# Patient Record
Sex: FEMALE | Race: BLACK OR AFRICAN AMERICAN | Employment: UNEMPLOYED | URBAN - METROPOLITAN AREA
[De-identification: names, ages, dates, MRNs, and addresses within clinical notes are randomized per-mention and may not be internally consistent; named-entity substitution may affect disease eponyms.]

---

## 2017-01-07 RX ORDER — FUROSEMIDE 40 MG/1
TABLET ORAL
Qty: 90 TABLET | Refills: 1 | Status: SHIPPED | OUTPATIENT
Start: 2017-01-07 | End: 2017-06-25 | Stop reason: SDUPTHER

## 2017-01-16 ENCOUNTER — TELEPHONE (OUTPATIENT)
Dept: FAMILY MEDICINE CLINIC | Age: 47
End: 2017-01-16

## 2017-01-16 RX ORDER — ATORVASTATIN CALCIUM 40 MG/1
40 TABLET, FILM COATED ORAL DAILY
Qty: 90 TABLET | Refills: 1 | Status: SHIPPED | OUTPATIENT
Start: 2017-01-16 | End: 2017-06-12 | Stop reason: SDUPTHER

## 2017-01-21 LAB
ALBUMIN SERPL-MCNC: 4.1 G/DL
ALP BLD-CCNC: 60 U/L
ALT SERPL-CCNC: 14 U/L
AST SERPL-CCNC: 16 U/L
BILIRUB SERPL-MCNC: 0.3 MG/DL (ref 0.1–1.4)
BUN BLDV-MCNC: 16 MG/DL
CALCIUM SERPL-MCNC: 9.1 MG/DL
CHLORIDE BLD-SCNC: 108 MMOL/L
CHOLESTEROL, TOTAL: 140 MG/DL
CHOLESTEROL/HDL RATIO: 3
CO2: NORMAL MMOL/L
CREAT SERPL-MCNC: 0.95 MG/DL
CREATININE, URINE: 123
GFR CALCULATED: >60
GLUCOSE BLD-MCNC: 124 MG/DL
HBA1C MFR BLD: 5.8 %
HDLC SERPL-MCNC: 46 MG/DL (ref 35–70)
LDL CHOLESTEROL CALCULATED: 77 MG/DL (ref 0–160)
MICROALBUMIN/CREAT 24H UR: <5 MG/G{CREAT}
MICROALBUMIN/CREAT UR-RTO: NORMAL
POTASSIUM SERPL-SCNC: 3.3 MMOL/L
SODIUM BLD-SCNC: 141 MMOL/L
TOTAL PROTEIN: 8.4
TRIGL SERPL-MCNC: 87 MG/DL
VLDLC SERPL CALC-MCNC: 17 MG/DL

## 2017-01-23 ENCOUNTER — OFFICE VISIT (OUTPATIENT)
Dept: FAMILY MEDICINE CLINIC | Age: 47
End: 2017-01-23

## 2017-01-23 VITALS
TEMPERATURE: 98.2 F | DIASTOLIC BLOOD PRESSURE: 82 MMHG | SYSTOLIC BLOOD PRESSURE: 110 MMHG | BODY MASS INDEX: 42.92 KG/M2 | HEART RATE: 80 BPM | WEIGHT: 251.4 LBS | RESPIRATION RATE: 16 BRPM | HEIGHT: 64 IN

## 2017-01-23 DIAGNOSIS — G89.29 CHRONIC PAIN OF RIGHT KNEE: ICD-10-CM

## 2017-01-23 DIAGNOSIS — E78.2 MIXED HYPERLIPIDEMIA: ICD-10-CM

## 2017-01-23 DIAGNOSIS — E11.42 DM TYPE 2 WITH DIABETIC PERIPHERAL NEUROPATHY (HCC): Primary | ICD-10-CM

## 2017-01-23 DIAGNOSIS — K21.9 GASTROESOPHAGEAL REFLUX DISEASE WITHOUT ESOPHAGITIS: ICD-10-CM

## 2017-01-23 DIAGNOSIS — I10 ESSENTIAL HYPERTENSION: ICD-10-CM

## 2017-01-23 DIAGNOSIS — Z23 NEED FOR PNEUMOCOCCAL VACCINATION: ICD-10-CM

## 2017-01-23 DIAGNOSIS — M54.50 ACUTE MIDLINE LOW BACK PAIN WITHOUT SCIATICA: ICD-10-CM

## 2017-01-23 DIAGNOSIS — E11.42 DM TYPE 2 WITH DIABETIC PERIPHERAL NEUROPATHY (HCC): ICD-10-CM

## 2017-01-23 DIAGNOSIS — M54.42 CHRONIC BILATERAL LOW BACK PAIN WITH BILATERAL SCIATICA: ICD-10-CM

## 2017-01-23 DIAGNOSIS — M25.561 CHRONIC PAIN OF RIGHT KNEE: ICD-10-CM

## 2017-01-23 DIAGNOSIS — M54.6 ACUTE MIDLINE THORACIC BACK PAIN: ICD-10-CM

## 2017-01-23 DIAGNOSIS — M54.41 CHRONIC BILATERAL LOW BACK PAIN WITH BILATERAL SCIATICA: ICD-10-CM

## 2017-01-23 DIAGNOSIS — E55.9 VITAMIN D DEFICIENCY: ICD-10-CM

## 2017-01-23 DIAGNOSIS — G89.29 CHRONIC BILATERAL LOW BACK PAIN WITH BILATERAL SCIATICA: ICD-10-CM

## 2017-01-23 PROCEDURE — 90732 PPSV23 VACC 2 YRS+ SUBQ/IM: CPT | Performed by: FAMILY MEDICINE

## 2017-01-23 PROCEDURE — 90471 IMMUNIZATION ADMIN: CPT | Performed by: FAMILY MEDICINE

## 2017-01-23 PROCEDURE — 99214 OFFICE O/P EST MOD 30 MIN: CPT | Performed by: FAMILY MEDICINE

## 2017-01-23 RX ORDER — ZOLPIDEM TARTRATE 10 MG/1
TABLET ORAL
Qty: 20 TABLET | Refills: 2 | Status: SHIPPED | OUTPATIENT
Start: 2017-01-23 | End: 2017-03-29 | Stop reason: SDUPTHER

## 2017-01-23 RX ORDER — OXYCODONE HYDROCHLORIDE 5 MG/1
5 TABLET ORAL EVERY 8 HOURS PRN
Qty: 60 TABLET | Refills: 0 | Status: SHIPPED | OUTPATIENT
Start: 2017-02-22 | End: 2017-04-04 | Stop reason: SDUPTHER

## 2017-01-23 RX ORDER — SPIRONOLACTONE 25 MG/1
25 TABLET ORAL DAILY
Qty: 90 TABLET | Refills: 1 | Status: ON HOLD | OUTPATIENT
Start: 2017-01-23 | End: 2017-08-17 | Stop reason: HOSPADM

## 2017-01-23 RX ORDER — ERGOCALCIFEROL 1.25 MG/1
CAPSULE ORAL
Qty: 12 CAPSULE | Refills: 1 | Status: SHIPPED | OUTPATIENT
Start: 2017-01-23 | End: 2017-06-05 | Stop reason: SDUPTHER

## 2017-01-23 RX ORDER — OXYCODONE HYDROCHLORIDE 5 MG/1
5 TABLET ORAL EVERY 8 HOURS PRN
Qty: 60 TABLET | Refills: 0 | Status: SHIPPED | OUTPATIENT
Start: 2017-03-23 | End: 2017-03-29

## 2017-01-23 RX ORDER — OXYCODONE HYDROCHLORIDE 5 MG/1
5 TABLET ORAL EVERY 8 HOURS PRN
Qty: 60 TABLET | Refills: 0 | Status: SHIPPED | OUTPATIENT
Start: 2017-01-23 | End: 2017-02-22

## 2017-01-23 RX ORDER — LIDOCAINE 50 MG/G
1 PATCH TOPICAL DAILY
Qty: 30 PATCH | Refills: 5 | Status: SHIPPED | OUTPATIENT
Start: 2017-01-23 | End: 2017-08-23 | Stop reason: SDUPTHER

## 2017-01-23 RX ORDER — GABAPENTIN 300 MG/1
CAPSULE ORAL
Qty: 270 CAPSULE | Refills: 1 | Status: SHIPPED | OUTPATIENT
Start: 2017-01-23 | End: 2017-02-09 | Stop reason: DRUGHIGH

## 2017-01-23 RX ORDER — POTASSIUM CHLORIDE 1.5 G/1.77G
20 POWDER, FOR SOLUTION ORAL 2 TIMES DAILY
Qty: 180 EACH | Refills: 1 | Status: SHIPPED | OUTPATIENT
Start: 2017-01-23 | End: 2017-08-23 | Stop reason: SDUPTHER

## 2017-01-23 RX ORDER — MELOXICAM 15 MG/1
15 TABLET ORAL DAILY
Qty: 90 TABLET | Refills: 1 | Status: ON HOLD | OUTPATIENT
Start: 2017-01-23 | End: 2017-08-17 | Stop reason: HOSPADM

## 2017-01-31 ENCOUNTER — TELEPHONE (OUTPATIENT)
Dept: FAMILY MEDICINE CLINIC | Age: 47
End: 2017-01-31

## 2017-02-02 PROBLEM — M25.562 ARTHRALGIA OF LEFT KNEE: Status: ACTIVE | Noted: 2017-02-02

## 2017-02-02 PROBLEM — K21.9 GASTROESOPHAGEAL REFLUX DISEASE WITHOUT ESOPHAGITIS: Status: ACTIVE | Noted: 2017-02-02

## 2017-02-02 PROBLEM — M47.25 OSTEOARTHRITIS OF SPINE WITH RADICULOPATHY, THORACOLUMBAR REGION: Status: ACTIVE | Noted: 2017-02-02

## 2017-02-02 PROBLEM — E66.01 MORBID OBESITY DUE TO EXCESS CALORIES (HCC): Status: ACTIVE | Noted: 2017-02-02

## 2017-02-09 PROBLEM — Q76.49 TRANSITIONAL VERTEBRAE: Status: ACTIVE | Noted: 2017-02-09

## 2017-02-14 PROBLEM — M54.16 LUMBAR RADICULOPATHY: Status: ACTIVE | Noted: 2017-02-14

## 2017-03-27 ENCOUNTER — TELEPHONE (OUTPATIENT)
Dept: FAMILY MEDICINE CLINIC | Age: 47
End: 2017-03-27

## 2017-03-28 LAB
ALBUMIN SERPL-MCNC: 3.8 G/DL
ALP BLD-CCNC: 61 U/L
ALT SERPL-CCNC: 15 U/L
AST SERPL-CCNC: 15 U/L
BILIRUB SERPL-MCNC: 0.2 MG/DL (ref 0.1–1.4)
BUN BLDV-MCNC: 20 MG/DL
CALCIUM SERPL-MCNC: 9.3 MG/DL
CHLORIDE BLD-SCNC: 112 MMOL/L
CHOLESTEROL, TOTAL: 152 MG/DL
CHOLESTEROL/HDL RATIO: 4
CO2: NORMAL MMOL/L
CREAT SERPL-MCNC: 0.83 MG/DL
GFR CALCULATED: >60
GLUCOSE BLD-MCNC: 112 MG/DL
HBA1C MFR BLD: 5.7 %
HDLC SERPL-MCNC: 38 MG/DL (ref 35–70)
LDL CHOLESTEROL CALCULATED: 85 MG/DL (ref 0–160)
POTASSIUM SERPL-SCNC: 3.5 MMOL/L
SODIUM BLD-SCNC: 142 MMOL/L
TOTAL PROTEIN: 7.6
TRIGL SERPL-MCNC: 145 MG/DL
VITAMIN D 25-HYDROXY: 40
VITAMIN D2, 25 HYDROXY: NORMAL
VITAMIN D3,25 HYDROXY: NORMAL
VLDLC SERPL CALC-MCNC: 29 MG/DL

## 2017-03-29 ENCOUNTER — OFFICE VISIT (OUTPATIENT)
Dept: FAMILY MEDICINE CLINIC | Age: 47
End: 2017-03-29

## 2017-03-29 VITALS
BODY MASS INDEX: 41.59 KG/M2 | SYSTOLIC BLOOD PRESSURE: 112 MMHG | DIASTOLIC BLOOD PRESSURE: 76 MMHG | TEMPERATURE: 97.6 F | RESPIRATION RATE: 16 BRPM | HEIGHT: 66 IN | WEIGHT: 258.8 LBS | HEART RATE: 76 BPM

## 2017-03-29 DIAGNOSIS — I10 ESSENTIAL HYPERTENSION: ICD-10-CM

## 2017-03-29 DIAGNOSIS — E66.01 MORBID OBESITY WITH BMI OF 40.0-44.9, ADULT (HCC): ICD-10-CM

## 2017-03-29 DIAGNOSIS — E78.2 MIXED HYPERLIPIDEMIA: ICD-10-CM

## 2017-03-29 DIAGNOSIS — E11.42 DM TYPE 2 WITH DIABETIC PERIPHERAL NEUROPATHY (HCC): Primary | ICD-10-CM

## 2017-03-29 DIAGNOSIS — E11.42 DM TYPE 2 WITH DIABETIC PERIPHERAL NEUROPATHY (HCC): ICD-10-CM

## 2017-03-29 DIAGNOSIS — E55.9 VITAMIN D DEFICIENCY: ICD-10-CM

## 2017-03-29 PROCEDURE — 99214 OFFICE O/P EST MOD 30 MIN: CPT | Performed by: FAMILY MEDICINE

## 2017-03-29 RX ORDER — BLOOD PRESSURE TEST KIT
KIT MISCELLANEOUS
Qty: 1 KIT | Refills: 0 | Status: SHIPPED | OUTPATIENT
Start: 2017-03-29 | End: 2017-08-23 | Stop reason: SDUPTHER

## 2017-03-29 RX ORDER — CARVEDILOL 6.25 MG/1
6.25 TABLET ORAL 2 TIMES DAILY
Qty: 60 TABLET | Refills: 3 | Status: SHIPPED | OUTPATIENT
Start: 2017-03-29 | End: 2017-07-23 | Stop reason: SDUPTHER

## 2017-03-29 RX ORDER — BUTALBITAL, ACETAMINOPHEN AND CAFFEINE 50; 325; 40 MG/1; MG/1; MG/1
1 TABLET ORAL 2 TIMES DAILY PRN
Qty: 180 TABLET | Status: CANCELLED | OUTPATIENT
Start: 2017-03-29

## 2017-03-29 RX ORDER — ZOLPIDEM TARTRATE 10 MG/1
TABLET ORAL
Qty: 20 TABLET | Refills: 2 | Status: SHIPPED | OUTPATIENT
Start: 2017-03-29 | End: 2017-06-09 | Stop reason: SDUPTHER

## 2017-04-07 PROBLEM — M48.061 LUMBAR CANAL STENOSIS: Status: ACTIVE | Noted: 2017-04-07

## 2017-04-13 RX ORDER — GLIPIZIDE 5 MG/1
TABLET ORAL
Qty: 60 TABLET | Refills: 5 | Status: SHIPPED | OUTPATIENT
Start: 2017-04-13 | End: 2017-06-12 | Stop reason: SDUPTHER

## 2017-05-02 PROBLEM — G82.20 PARAPARESIS, BILATERAL (HCC): Status: ACTIVE | Noted: 2017-05-02

## 2017-05-02 PROBLEM — M17.11 ARTHRITIS OF RIGHT KNEE: Status: ACTIVE | Noted: 2017-05-02

## 2017-05-08 RX ORDER — DICYCLOMINE HCL 20 MG
TABLET ORAL
Qty: 60 TABLET | Refills: 3 | Status: ON HOLD | OUTPATIENT
Start: 2017-05-08 | End: 2017-08-17 | Stop reason: HOSPADM

## 2017-06-01 PROBLEM — D17.79 EPIDURAL LIPOMATOSIS: Status: ACTIVE | Noted: 2017-06-01

## 2017-06-08 RX ORDER — GABAPENTIN 300 MG/1
CAPSULE ORAL
Qty: 540 CAPSULE | Refills: 1 | OUTPATIENT
Start: 2017-06-08

## 2017-06-09 ENCOUNTER — TELEPHONE (OUTPATIENT)
Dept: FAMILY MEDICINE CLINIC | Age: 47
End: 2017-06-09

## 2017-06-12 RX ORDER — ATORVASTATIN CALCIUM 40 MG/1
TABLET, FILM COATED ORAL
Qty: 90 TABLET | Refills: 1 | Status: SHIPPED | OUTPATIENT
Start: 2017-06-12 | End: 2017-08-23 | Stop reason: SDUPTHER

## 2017-06-12 RX ORDER — GLIPIZIDE 5 MG/1
TABLET ORAL
Qty: 180 TABLET | Refills: 1 | Status: SHIPPED | OUTPATIENT
Start: 2017-06-12 | End: 2017-08-23 | Stop reason: SDUPTHER

## 2017-06-24 LAB
ALBUMIN SERPL-MCNC: 3.9 G/DL
ALP BLD-CCNC: 55 U/L
ALT SERPL-CCNC: 14 U/L
AST SERPL-CCNC: 15 U/L
BILIRUB SERPL-MCNC: 0.3 MG/DL (ref 0.1–1.4)
BUN BLDV-MCNC: NORMAL MG/DL
CALCIUM SERPL-MCNC: 9.1 MG/DL
CHLORIDE BLD-SCNC: 111 MMOL/L
CHOLESTEROL, TOTAL: 138 MG/DL
CHOLESTEROL/HDL RATIO: 3.1
CO2: NORMAL MMOL/L
CREAT SERPL-MCNC: 0.85 MG/DL
GFR CALCULATED: >60
GLUCOSE BLD-MCNC: 92 MG/DL
HBA1C MFR BLD: 6.3 %
HDLC SERPL-MCNC: 44 MG/DL (ref 35–70)
LDL CHOLESTEROL CALCULATED: 77 MG/DL (ref 0–160)
POTASSIUM SERPL-SCNC: 3.6 MMOL/L
SODIUM BLD-SCNC: 140 MMOL/L
TOTAL PROTEIN: 7.9
TRIGL SERPL-MCNC: 85 MG/DL
VLDLC SERPL CALC-MCNC: 17 MG/DL

## 2017-06-26 DIAGNOSIS — E11.42 DM TYPE 2 WITH DIABETIC PERIPHERAL NEUROPATHY (HCC): ICD-10-CM

## 2017-06-26 DIAGNOSIS — G89.29 CHRONIC BILATERAL LOW BACK PAIN WITH BILATERAL SCIATICA: ICD-10-CM

## 2017-06-26 DIAGNOSIS — I10 ESSENTIAL HYPERTENSION: ICD-10-CM

## 2017-06-26 DIAGNOSIS — M54.42 CHRONIC BILATERAL LOW BACK PAIN WITH BILATERAL SCIATICA: ICD-10-CM

## 2017-06-26 DIAGNOSIS — G89.29 CHRONIC PAIN OF RIGHT KNEE: ICD-10-CM

## 2017-06-26 DIAGNOSIS — M25.561 CHRONIC PAIN OF RIGHT KNEE: ICD-10-CM

## 2017-06-26 DIAGNOSIS — E78.2 MIXED HYPERLIPIDEMIA: ICD-10-CM

## 2017-06-26 DIAGNOSIS — M54.41 CHRONIC BILATERAL LOW BACK PAIN WITH BILATERAL SCIATICA: ICD-10-CM

## 2017-06-26 RX ORDER — MELOXICAM 15 MG/1
TABLET ORAL
Qty: 90 TABLET | Refills: 1 | Status: SHIPPED | OUTPATIENT
Start: 2017-06-26 | End: 2017-08-23 | Stop reason: SDUPTHER

## 2017-06-27 ENCOUNTER — OFFICE VISIT (OUTPATIENT)
Dept: FAMILY MEDICINE CLINIC | Age: 47
End: 2017-06-27

## 2017-06-27 VITALS
RESPIRATION RATE: 16 BRPM | TEMPERATURE: 97.4 F | DIASTOLIC BLOOD PRESSURE: 64 MMHG | SYSTOLIC BLOOD PRESSURE: 102 MMHG | HEART RATE: 68 BPM | WEIGHT: 252.2 LBS | BODY MASS INDEX: 46.41 KG/M2 | HEIGHT: 62 IN

## 2017-06-27 DIAGNOSIS — M54.41 CHRONIC BILATERAL LOW BACK PAIN WITH BILATERAL SCIATICA: ICD-10-CM

## 2017-06-27 DIAGNOSIS — E66.01 MORBID OBESITY WITH BMI OF 45.0-49.9, ADULT (HCC): ICD-10-CM

## 2017-06-27 DIAGNOSIS — E78.2 MIXED HYPERLIPIDEMIA: ICD-10-CM

## 2017-06-27 DIAGNOSIS — K21.9 GASTROESOPHAGEAL REFLUX DISEASE WITHOUT ESOPHAGITIS: ICD-10-CM

## 2017-06-27 DIAGNOSIS — G89.29 CHRONIC BILATERAL LOW BACK PAIN WITH BILATERAL SCIATICA: ICD-10-CM

## 2017-06-27 DIAGNOSIS — M54.42 CHRONIC BILATERAL LOW BACK PAIN WITH BILATERAL SCIATICA: ICD-10-CM

## 2017-06-27 DIAGNOSIS — E11.42 DM TYPE 2 WITH DIABETIC PERIPHERAL NEUROPATHY (HCC): Primary | ICD-10-CM

## 2017-06-27 DIAGNOSIS — I10 ESSENTIAL HYPERTENSION: ICD-10-CM

## 2017-06-27 DIAGNOSIS — F41.1 GENERALIZED ANXIETY DISORDER: ICD-10-CM

## 2017-06-27 PROCEDURE — 99214 OFFICE O/P EST MOD 30 MIN: CPT | Performed by: FAMILY MEDICINE

## 2017-06-27 ASSESSMENT — PATIENT HEALTH QUESTIONNAIRE - PHQ9
SUM OF ALL RESPONSES TO PHQ9 QUESTIONS 1 & 2: 0
SUM OF ALL RESPONSES TO PHQ QUESTIONS 1-9: 0
2. FEELING DOWN, DEPRESSED OR HOPELESS: 0
1. LITTLE INTEREST OR PLEASURE IN DOING THINGS: 0

## 2017-07-11 RX ORDER — ATORVASTATIN CALCIUM 40 MG/1
TABLET, FILM COATED ORAL
Qty: 90 TABLET | Refills: 3 | OUTPATIENT
Start: 2017-07-11

## 2017-08-09 ENCOUNTER — HOSPITAL ENCOUNTER (OUTPATIENT)
Dept: PREADMISSION TESTING | Age: 47
Discharge: HOME | End: 2017-08-09
Payer: COMMERCIAL

## 2017-08-09 VITALS
SYSTOLIC BLOOD PRESSURE: 106 MMHG | HEART RATE: 84 BPM | WEIGHT: 252 LBS | TEMPERATURE: 97.2 F | OXYGEN SATURATION: 97 % | BODY MASS INDEX: 47.58 KG/M2 | HEIGHT: 61 IN | RESPIRATION RATE: 16 BRPM | DIASTOLIC BLOOD PRESSURE: 71 MMHG

## 2017-08-09 LAB
ABO/RH: NORMAL
ALBUMIN SERPL-MCNC: 4.1 G/DL (ref 3.9–4.9)
ALP BLD-CCNC: 57 U/L (ref 40–130)
ALT SERPL-CCNC: 14 U/L (ref 0–33)
ANION GAP SERPL CALCULATED.3IONS-SCNC: 13 MEQ/L (ref 7–13)
ANTIBODY SCREEN: NORMAL
APTT: 25.9 SEC (ref 21.6–35.4)
AST SERPL-CCNC: 16 U/L (ref 0–35)
BILIRUB SERPL-MCNC: 0.2 MG/DL (ref 0–1.2)
BILIRUBIN DIRECT: 0 MG/DL (ref 0–0.3)
BILIRUBIN URINE: NEGATIVE
BILIRUBIN, INDIRECT: 0.2 MG/DL (ref 0–0.6)
BLOOD, URINE: NEGATIVE
BUN BLDV-MCNC: 13 MG/DL (ref 6–20)
CALCIUM SERPL-MCNC: 8.6 MG/DL (ref 8.6–10.2)
CHLORIDE BLD-SCNC: 108 MEQ/L (ref 98–107)
CLARITY: CLEAR
CO2: 22 MEQ/L (ref 22–29)
COLOR: YELLOW
CREAT SERPL-MCNC: 0.78 MG/DL (ref 0.5–0.9)
EKG ATRIAL RATE: 79 BPM
EKG P AXIS: 53 DEGREES
EKG P-R INTERVAL: 178 MS
EKG Q-T INTERVAL: 396 MS
EKG QRS DURATION: 72 MS
EKG QTC CALCULATION (BAZETT): 454 MS
EKG R AXIS: 31 DEGREES
EKG T AXIS: 34 DEGREES
EKG VENTRICULAR RATE: 79 BPM
GFR AFRICAN AMERICAN: >60
GFR NON-AFRICAN AMERICAN: >60
GLUCOSE BLD-MCNC: 63 MG/DL (ref 74–109)
GLUCOSE URINE: NEGATIVE MG/DL
HCT VFR BLD CALC: 37.3 % (ref 37–47)
HEMOGLOBIN: 11.7 G/DL (ref 12–16)
INR BLD: 1
KETONES, URINE: NEGATIVE MG/DL
LEUKOCYTE ESTERASE, URINE: NEGATIVE
MCH RBC QN AUTO: 27 PG (ref 27–31.3)
MCHC RBC AUTO-ENTMCNC: 31.4 % (ref 33–37)
MCV RBC AUTO: 86.1 FL (ref 82–100)
NITRITE, URINE: NEGATIVE
PDW BLD-RTO: 15.9 % (ref 11.5–14.5)
PH UA: 5 (ref 5–9)
PLATELET # BLD: 119 K/UL (ref 130–400)
POTASSIUM SERPL-SCNC: 3.7 MEQ/L (ref 3.5–5.1)
PROTEIN UA: NEGATIVE MG/DL
PROTHROMBIN TIME: 10.4 SEC (ref 8.1–13.7)
RBC # BLD: 4.34 M/UL (ref 4.2–5.4)
SODIUM BLD-SCNC: 143 MEQ/L (ref 132–144)
SPECIFIC GRAVITY UA: 1.01 (ref 1–1.03)
TOTAL PROTEIN: 7.9 G/DL (ref 6.4–8.1)
URINE REFLEX TO CULTURE: NORMAL
UROBILINOGEN, URINE: 0.2 E.U./DL
WBC # BLD: 7.3 K/UL (ref 4.8–10.8)

## 2017-08-09 PROCEDURE — 85027 COMPLETE CBC AUTOMATED: CPT

## 2017-08-09 PROCEDURE — 80076 HEPATIC FUNCTION PANEL: CPT

## 2017-08-09 PROCEDURE — 80048 BASIC METABOLIC PNL TOTAL CA: CPT

## 2017-08-09 PROCEDURE — 81003 URINALYSIS AUTO W/O SCOPE: CPT

## 2017-08-09 PROCEDURE — 93010 ELECTROCARDIOGRAM REPORT: CPT | Performed by: INTERNAL MEDICINE

## 2017-08-09 PROCEDURE — 85730 THROMBOPLASTIN TIME PARTIAL: CPT

## 2017-08-09 PROCEDURE — 86850 RBC ANTIBODY SCREEN: CPT

## 2017-08-09 PROCEDURE — 85610 PROTHROMBIN TIME: CPT

## 2017-08-09 PROCEDURE — 86901 BLOOD TYPING SEROLOGIC RH(D): CPT

## 2017-08-09 PROCEDURE — 86900 BLOOD TYPING SEROLOGIC ABO: CPT

## 2017-08-09 RX ORDER — LIDOCAINE HYDROCHLORIDE 10 MG/ML
1 INJECTION, SOLUTION EPIDURAL; INFILTRATION; INTRACAUDAL; PERINEURAL
Status: CANCELLED | OUTPATIENT
Start: 2017-08-09 | End: 2017-08-09

## 2017-08-09 RX ORDER — SODIUM CHLORIDE, SODIUM LACTATE, POTASSIUM CHLORIDE, CALCIUM CHLORIDE 600; 310; 30; 20 MG/100ML; MG/100ML; MG/100ML; MG/100ML
INJECTION, SOLUTION INTRAVENOUS CONTINUOUS
Status: CANCELLED | OUTPATIENT
Start: 2017-08-16

## 2017-08-09 RX ORDER — SODIUM CHLORIDE 0.9 % (FLUSH) 0.9 %
10 SYRINGE (ML) INJECTION EVERY 12 HOURS SCHEDULED
Status: CANCELLED | OUTPATIENT
Start: 2017-08-09

## 2017-08-09 RX ORDER — SODIUM CHLORIDE 0.9 % (FLUSH) 0.9 %
10 SYRINGE (ML) INJECTION PRN
Status: CANCELLED | OUTPATIENT
Start: 2017-08-09

## 2017-08-09 ASSESSMENT — ENCOUNTER SYMPTOMS
DOUBLE VISION: 0
HEARTBURN: 0
GASTROINTESTINAL NEGATIVE: 1
CONSTIPATION: 0
NAUSEA: 0
COUGH: 0
SORE THROAT: 0
BACK PAIN: 1
VOMITING: 0
WHEEZING: 0
RESPIRATORY NEGATIVE: 1
ABDOMINAL PAIN: 0
DIARRHEA: 0
BLURRED VISION: 0
BLOOD IN STOOL: 0
SHORTNESS OF BREATH: 0
EYE PAIN: 0

## 2017-08-15 ENCOUNTER — ANESTHESIA EVENT (OUTPATIENT)
Dept: OPERATING ROOM | Age: 47
End: 2017-08-15
Payer: COMMERCIAL

## 2017-08-15 ASSESSMENT — ENCOUNTER SYMPTOMS: SHORTNESS OF BREATH: 1

## 2017-08-16 ENCOUNTER — APPOINTMENT (OUTPATIENT)
Dept: GENERAL RADIOLOGY | Age: 47
End: 2017-08-16
Attending: NEUROLOGICAL SURGERY
Payer: COMMERCIAL

## 2017-08-16 ENCOUNTER — ANESTHESIA (OUTPATIENT)
Dept: OPERATING ROOM | Age: 47
End: 2017-08-16
Payer: COMMERCIAL

## 2017-08-16 ENCOUNTER — HOSPITAL ENCOUNTER (OUTPATIENT)
Age: 47
Setting detail: OBSERVATION
Discharge: NURSING, REHAB OR LONG-TERM CARE | End: 2017-08-17
Attending: NEUROLOGICAL SURGERY | Admitting: NEUROLOGICAL SURGERY
Payer: COMMERCIAL

## 2017-08-16 VITALS — TEMPERATURE: 95.5 F | DIASTOLIC BLOOD PRESSURE: 80 MMHG | SYSTOLIC BLOOD PRESSURE: 156 MMHG | OXYGEN SATURATION: 97 %

## 2017-08-16 LAB
GLUCOSE BLD-MCNC: 119 MG/DL (ref 60–115)
GLUCOSE BLD-MCNC: 121 MG/DL (ref 60–115)
GLUCOSE BLD-MCNC: 122 MG/DL (ref 60–115)
GLUCOSE BLD-MCNC: 126 MG/DL (ref 60–115)
PERFORMED ON: ABNORMAL

## 2017-08-16 PROCEDURE — 3700000000 HC ANESTHESIA ATTENDED CARE: Performed by: NEUROLOGICAL SURGERY

## 2017-08-16 PROCEDURE — 6360000002 HC RX W HCPCS: Performed by: NURSE ANESTHETIST, CERTIFIED REGISTERED

## 2017-08-16 PROCEDURE — 6370000000 HC RX 637 (ALT 250 FOR IP): Performed by: NEUROLOGICAL SURGERY

## 2017-08-16 PROCEDURE — G0378 HOSPITAL OBSERVATION PER HR: HCPCS

## 2017-08-16 PROCEDURE — 2580000003 HC RX 258: Performed by: NEUROLOGICAL SURGERY

## 2017-08-16 PROCEDURE — 6370000000 HC RX 637 (ALT 250 FOR IP): Performed by: INTERNAL MEDICINE

## 2017-08-16 PROCEDURE — 3700000001 HC ADD 15 MINUTES (ANESTHESIA): Performed by: NEUROLOGICAL SURGERY

## 2017-08-16 PROCEDURE — A6258 TRANSPARENT FILM >16<=48 IN: HCPCS | Performed by: NEUROLOGICAL SURGERY

## 2017-08-16 PROCEDURE — 96365 THER/PROPH/DIAG IV INF INIT: CPT

## 2017-08-16 PROCEDURE — 3600000014 HC SURGERY LEVEL 4 ADDTL 15MIN: Performed by: NEUROLOGICAL SURGERY

## 2017-08-16 PROCEDURE — 3209999900 FLUORO FOR SURGICAL PROCEDURES

## 2017-08-16 PROCEDURE — 6360000002 HC RX W HCPCS: Performed by: NEUROLOGICAL SURGERY

## 2017-08-16 PROCEDURE — 94664 DEMO&/EVAL PT USE INHALER: CPT

## 2017-08-16 PROCEDURE — 7100000000 HC PACU RECOVERY - FIRST 15 MIN: Performed by: NEUROLOGICAL SURGERY

## 2017-08-16 PROCEDURE — 2720000010 HC SURG SUPPLY STERILE: Performed by: NEUROLOGICAL SURGERY

## 2017-08-16 PROCEDURE — 6360000002 HC RX W HCPCS: Performed by: ANESTHESIOLOGY

## 2017-08-16 PROCEDURE — 3600000004 HC SURGERY LEVEL 4 BASE: Performed by: NEUROLOGICAL SURGERY

## 2017-08-16 PROCEDURE — 94640 AIRWAY INHALATION TREATMENT: CPT

## 2017-08-16 PROCEDURE — 2500000003 HC RX 250 WO HCPCS: Performed by: NURSE ANESTHETIST, CERTIFIED REGISTERED

## 2017-08-16 PROCEDURE — 96366 THER/PROPH/DIAG IV INF ADDON: CPT

## 2017-08-16 PROCEDURE — 2500000003 HC RX 250 WO HCPCS: Performed by: STUDENT IN AN ORGANIZED HEALTH CARE EDUCATION/TRAINING PROGRAM

## 2017-08-16 PROCEDURE — 7100000001 HC PACU RECOVERY - ADDTL 15 MIN: Performed by: NEUROLOGICAL SURGERY

## 2017-08-16 PROCEDURE — 2500000003 HC RX 250 WO HCPCS: Performed by: NEUROLOGICAL SURGERY

## 2017-08-16 RX ORDER — METOCLOPRAMIDE HYDROCHLORIDE 5 MG/ML
10 INJECTION INTRAMUSCULAR; INTRAVENOUS
Status: DISCONTINUED | OUTPATIENT
Start: 2017-08-16 | End: 2017-08-16 | Stop reason: HOSPADM

## 2017-08-16 RX ORDER — ALBUTEROL SULFATE 90 UG/1
2 AEROSOL, METERED RESPIRATORY (INHALATION) 3 TIMES DAILY
Status: DISCONTINUED | OUTPATIENT
Start: 2017-08-16 | End: 2017-08-17 | Stop reason: HOSPADM

## 2017-08-16 RX ORDER — ONDANSETRON 2 MG/ML
4 INJECTION INTRAMUSCULAR; INTRAVENOUS
Status: DISCONTINUED | OUTPATIENT
Start: 2017-08-16 | End: 2017-08-16 | Stop reason: HOSPADM

## 2017-08-16 RX ORDER — MAGNESIUM HYDROXIDE 1200 MG/15ML
LIQUID ORAL CONTINUOUS PRN
Status: DISCONTINUED | OUTPATIENT
Start: 2017-08-16 | End: 2017-08-16 | Stop reason: HOSPADM

## 2017-08-16 RX ORDER — DEXTROSE MONOHYDRATE 25 G/50ML
12.5 INJECTION, SOLUTION INTRAVENOUS PRN
Status: DISCONTINUED | OUTPATIENT
Start: 2017-08-16 | End: 2017-08-17 | Stop reason: HOSPADM

## 2017-08-16 RX ORDER — ONDANSETRON 2 MG/ML
4 INJECTION INTRAMUSCULAR; INTRAVENOUS EVERY 6 HOURS PRN
Status: DISCONTINUED | OUTPATIENT
Start: 2017-08-16 | End: 2017-08-17 | Stop reason: HOSPADM

## 2017-08-16 RX ORDER — NITROGLYCERIN 0.4 MG/1
0.4 TABLET SUBLINGUAL EVERY 5 MIN PRN
Status: DISCONTINUED | OUTPATIENT
Start: 2017-08-16 | End: 2017-08-17 | Stop reason: HOSPADM

## 2017-08-16 RX ORDER — DEXTROSE MONOHYDRATE 50 MG/ML
100 INJECTION, SOLUTION INTRAVENOUS PRN
Status: DISCONTINUED | OUTPATIENT
Start: 2017-08-16 | End: 2017-08-17 | Stop reason: HOSPADM

## 2017-08-16 RX ORDER — SODIUM CHLORIDE 0.9 % (FLUSH) 0.9 %
10 SYRINGE (ML) INJECTION PRN
Status: DISCONTINUED | OUTPATIENT
Start: 2017-08-16 | End: 2017-08-17 | Stop reason: HOSPADM

## 2017-08-16 RX ORDER — FUROSEMIDE 40 MG/1
40 TABLET ORAL DAILY
Status: DISCONTINUED | OUTPATIENT
Start: 2017-08-17 | End: 2017-08-17 | Stop reason: HOSPADM

## 2017-08-16 RX ORDER — SODIUM CHLORIDE 0.9 % (FLUSH) 0.9 %
10 SYRINGE (ML) INJECTION EVERY 12 HOURS SCHEDULED
Status: DISCONTINUED | OUTPATIENT
Start: 2017-08-16 | End: 2017-08-16 | Stop reason: HOSPADM

## 2017-08-16 RX ORDER — OMEPRAZOLE 40 MG/1
1 CAPSULE, DELAYED RELEASE ORAL DAILY
Status: DISCONTINUED | OUTPATIENT
Start: 2017-08-17 | End: 2017-08-16 | Stop reason: CLARIF

## 2017-08-16 RX ORDER — ASPIRIN 81 MG/1
81 TABLET, CHEWABLE ORAL DAILY
Status: DISCONTINUED | OUTPATIENT
Start: 2017-08-17 | End: 2017-08-17 | Stop reason: HOSPADM

## 2017-08-16 RX ORDER — ROCURONIUM BROMIDE 10 MG/ML
INJECTION, SOLUTION INTRAVENOUS PRN
Status: DISCONTINUED | OUTPATIENT
Start: 2017-08-16 | End: 2017-08-16 | Stop reason: SDUPTHER

## 2017-08-16 RX ORDER — CARVEDILOL 6.25 MG/1
6.25 TABLET ORAL 2 TIMES DAILY
Status: DISCONTINUED | OUTPATIENT
Start: 2017-08-16 | End: 2017-08-17 | Stop reason: HOSPADM

## 2017-08-16 RX ORDER — ACETAMINOPHEN 325 MG/1
650 TABLET ORAL EVERY 4 HOURS PRN
Status: DISCONTINUED | OUTPATIENT
Start: 2017-08-16 | End: 2017-08-17 | Stop reason: HOSPADM

## 2017-08-16 RX ORDER — SODIUM CHLORIDE, SODIUM LACTATE, POTASSIUM CHLORIDE, CALCIUM CHLORIDE 600; 310; 30; 20 MG/100ML; MG/100ML; MG/100ML; MG/100ML
INJECTION, SOLUTION INTRAVENOUS CONTINUOUS
Status: DISCONTINUED | OUTPATIENT
Start: 2017-08-16 | End: 2017-08-16

## 2017-08-16 RX ORDER — ONDANSETRON 2 MG/ML
INJECTION INTRAMUSCULAR; INTRAVENOUS PRN
Status: DISCONTINUED | OUTPATIENT
Start: 2017-08-16 | End: 2017-08-16 | Stop reason: SDUPTHER

## 2017-08-16 RX ORDER — OXYCODONE HYDROCHLORIDE AND ACETAMINOPHEN 5; 325 MG/1; MG/1
1 TABLET ORAL 4 TIMES DAILY PRN
Status: DISCONTINUED | OUTPATIENT
Start: 2017-08-16 | End: 2017-08-17 | Stop reason: HOSPADM

## 2017-08-16 RX ORDER — LIDOCAINE HYDROCHLORIDE 10 MG/ML
1 INJECTION, SOLUTION EPIDURAL; INFILTRATION; INTRACAUDAL; PERINEURAL
Status: COMPLETED | OUTPATIENT
Start: 2017-08-16 | End: 2017-08-16

## 2017-08-16 RX ORDER — ALBUTEROL SULFATE 90 UG/1
2 AEROSOL, METERED RESPIRATORY (INHALATION) EVERY 6 HOURS PRN
Status: DISCONTINUED | OUTPATIENT
Start: 2017-08-16 | End: 2017-08-16

## 2017-08-16 RX ORDER — SODIUM CHLORIDE 9 MG/ML
INJECTION, SOLUTION INTRAVENOUS CONTINUOUS
Status: DISCONTINUED | OUTPATIENT
Start: 2017-08-16 | End: 2017-08-17 | Stop reason: HOSPADM

## 2017-08-16 RX ORDER — NICOTINE POLACRILEX 4 MG
15 LOZENGE BUCCAL PRN
Status: DISCONTINUED | OUTPATIENT
Start: 2017-08-16 | End: 2017-08-17 | Stop reason: HOSPADM

## 2017-08-16 RX ORDER — PANTOPRAZOLE SODIUM 40 MG/1
40 TABLET, DELAYED RELEASE ORAL
Status: DISCONTINUED | OUTPATIENT
Start: 2017-08-17 | End: 2017-08-17 | Stop reason: HOSPADM

## 2017-08-16 RX ORDER — HYDROCODONE BITARTRATE AND ACETAMINOPHEN 5; 325 MG/1; MG/1
1 TABLET ORAL PRN
Status: DISCONTINUED | OUTPATIENT
Start: 2017-08-16 | End: 2017-08-16 | Stop reason: HOSPADM

## 2017-08-16 RX ORDER — DOCUSATE SODIUM 100 MG/1
100 CAPSULE, LIQUID FILLED ORAL 2 TIMES DAILY
Status: DISCONTINUED | OUTPATIENT
Start: 2017-08-16 | End: 2017-08-17 | Stop reason: HOSPADM

## 2017-08-16 RX ORDER — SPIRONOLACTONE 25 MG/1
25 TABLET ORAL DAILY
Status: DISCONTINUED | OUTPATIENT
Start: 2017-08-17 | End: 2017-08-17 | Stop reason: HOSPADM

## 2017-08-16 RX ORDER — DIPHENHYDRAMINE HYDROCHLORIDE 50 MG/ML
12.5 INJECTION INTRAMUSCULAR; INTRAVENOUS
Status: DISCONTINUED | OUTPATIENT
Start: 2017-08-16 | End: 2017-08-16 | Stop reason: HOSPADM

## 2017-08-16 RX ORDER — LIDOCAINE HYDROCHLORIDE 20 MG/ML
INJECTION, SOLUTION EPIDURAL; INFILTRATION; INTRACAUDAL; PERINEURAL PRN
Status: DISCONTINUED | OUTPATIENT
Start: 2017-08-16 | End: 2017-08-16 | Stop reason: SDUPTHER

## 2017-08-16 RX ORDER — SODIUM CHLORIDE 0.9 % (FLUSH) 0.9 %
10 SYRINGE (ML) INJECTION PRN
Status: DISCONTINUED | OUTPATIENT
Start: 2017-08-16 | End: 2017-08-16 | Stop reason: HOSPADM

## 2017-08-16 RX ORDER — PROPOFOL 10 MG/ML
INJECTION, EMULSION INTRAVENOUS PRN
Status: DISCONTINUED | OUTPATIENT
Start: 2017-08-16 | End: 2017-08-16 | Stop reason: SDUPTHER

## 2017-08-16 RX ORDER — BUPIVACAINE HYDROCHLORIDE AND EPINEPHRINE 5; 5 MG/ML; UG/ML
INJECTION, SOLUTION EPIDURAL; INTRACAUDAL; PERINEURAL PRN
Status: DISCONTINUED | OUTPATIENT
Start: 2017-08-16 | End: 2017-08-16 | Stop reason: HOSPADM

## 2017-08-16 RX ORDER — MEPERIDINE HYDROCHLORIDE 25 MG/ML
12.5 INJECTION INTRAMUSCULAR; INTRAVENOUS; SUBCUTANEOUS EVERY 5 MIN PRN
Status: DISCONTINUED | OUTPATIENT
Start: 2017-08-16 | End: 2017-08-16 | Stop reason: HOSPADM

## 2017-08-16 RX ORDER — FENTANYL CITRATE 50 UG/ML
50 INJECTION, SOLUTION INTRAMUSCULAR; INTRAVENOUS EVERY 10 MIN PRN
Status: DISCONTINUED | OUTPATIENT
Start: 2017-08-16 | End: 2017-08-16 | Stop reason: HOSPADM

## 2017-08-16 RX ORDER — ALBUTEROL SULFATE 90 UG/1
2 AEROSOL, METERED RESPIRATORY (INHALATION)
Status: DISCONTINUED | OUTPATIENT
Start: 2017-08-16 | End: 2017-08-17 | Stop reason: HOSPADM

## 2017-08-16 RX ORDER — HYDROCODONE BITARTRATE AND ACETAMINOPHEN 5; 325 MG/1; MG/1
2 TABLET ORAL PRN
Status: DISCONTINUED | OUTPATIENT
Start: 2017-08-16 | End: 2017-08-16 | Stop reason: HOSPADM

## 2017-08-16 RX ORDER — GABAPENTIN 300 MG/1
600 CAPSULE ORAL 3 TIMES DAILY
Status: DISCONTINUED | OUTPATIENT
Start: 2017-08-16 | End: 2017-08-17 | Stop reason: HOSPADM

## 2017-08-16 RX ORDER — SODIUM CHLORIDE 0.9 % (FLUSH) 0.9 %
10 SYRINGE (ML) INJECTION EVERY 12 HOURS SCHEDULED
Status: DISCONTINUED | OUTPATIENT
Start: 2017-08-16 | End: 2017-08-17 | Stop reason: HOSPADM

## 2017-08-16 RX ORDER — FENTANYL CITRATE 50 UG/ML
INJECTION, SOLUTION INTRAMUSCULAR; INTRAVENOUS PRN
Status: DISCONTINUED | OUTPATIENT
Start: 2017-08-16 | End: 2017-08-16 | Stop reason: SDUPTHER

## 2017-08-16 RX ORDER — MIDAZOLAM HYDROCHLORIDE 1 MG/ML
INJECTION INTRAMUSCULAR; INTRAVENOUS PRN
Status: DISCONTINUED | OUTPATIENT
Start: 2017-08-16 | End: 2017-08-16 | Stop reason: SDUPTHER

## 2017-08-16 RX ORDER — DEXAMETHASONE SODIUM PHOSPHATE 4 MG/ML
INJECTION, SOLUTION INTRA-ARTICULAR; INTRALESIONAL; INTRAMUSCULAR; INTRAVENOUS; SOFT TISSUE PRN
Status: DISCONTINUED | OUTPATIENT
Start: 2017-08-16 | End: 2017-08-16 | Stop reason: SDUPTHER

## 2017-08-16 RX ORDER — INSULIN GLARGINE 100 [IU]/ML
30 INJECTION, SOLUTION SUBCUTANEOUS NIGHTLY
Status: DISCONTINUED | OUTPATIENT
Start: 2017-08-16 | End: 2017-08-17 | Stop reason: HOSPADM

## 2017-08-16 RX ORDER — MORPHINE SULFATE 1 MG/ML
INJECTION, SOLUTION EPIDURAL; INTRATHECAL; INTRAVENOUS PRN
Status: DISCONTINUED | OUTPATIENT
Start: 2017-08-16 | End: 2017-08-16 | Stop reason: HOSPADM

## 2017-08-16 RX ORDER — MELOXICAM 7.5 MG/1
15 TABLET ORAL DAILY
Status: DISCONTINUED | OUTPATIENT
Start: 2017-08-17 | End: 2017-08-17 | Stop reason: HOSPADM

## 2017-08-16 RX ORDER — ATORVASTATIN CALCIUM 40 MG/1
40 TABLET, FILM COATED ORAL DAILY
Status: DISCONTINUED | OUTPATIENT
Start: 2017-08-17 | End: 2017-08-17 | Stop reason: HOSPADM

## 2017-08-16 RX ADMIN — ROCURONIUM BROMIDE 50 MG: 10 INJECTION INTRAVENOUS at 07:34

## 2017-08-16 RX ADMIN — FENTANYL CITRATE 25 MCG: 50 INJECTION, SOLUTION INTRAMUSCULAR; INTRAVENOUS at 09:54

## 2017-08-16 RX ADMIN — DOCUSATE SODIUM 100 MG: 100 CAPSULE, LIQUID FILLED ORAL at 21:38

## 2017-08-16 RX ADMIN — HYDROMORPHONE HYDROCHLORIDE 0.5 MG: 1 INJECTION, SOLUTION INTRAMUSCULAR; INTRAVENOUS; SUBCUTANEOUS at 11:30

## 2017-08-16 RX ADMIN — OXYCODONE HYDROCHLORIDE AND ACETAMINOPHEN 1 TABLET: 5; 325 TABLET ORAL at 14:45

## 2017-08-16 RX ADMIN — SODIUM CHLORIDE, POTASSIUM CHLORIDE, SODIUM LACTATE AND CALCIUM CHLORIDE: 600; 310; 30; 20 INJECTION, SOLUTION INTRAVENOUS at 08:45

## 2017-08-16 RX ADMIN — FENTANYL CITRATE 50 MCG: 50 INJECTION, SOLUTION INTRAMUSCULAR; INTRAVENOUS at 08:07

## 2017-08-16 RX ADMIN — HYDROMORPHONE HYDROCHLORIDE 0.5 MG: 1 INJECTION, SOLUTION INTRAMUSCULAR; INTRAVENOUS; SUBCUTANEOUS at 12:00

## 2017-08-16 RX ADMIN — ROCURONIUM BROMIDE 10 MG: 10 INJECTION INTRAVENOUS at 08:17

## 2017-08-16 RX ADMIN — HYDROMORPHONE HYDROCHLORIDE 0.5 MG: 1 INJECTION, SOLUTION INTRAMUSCULAR; INTRAVENOUS; SUBCUTANEOUS at 11:50

## 2017-08-16 RX ADMIN — FENTANYL CITRATE 50 MCG: 50 INJECTION, SOLUTION INTRAMUSCULAR; INTRAVENOUS at 09:06

## 2017-08-16 RX ADMIN — LIDOCAINE HYDROCHLORIDE 0.1 ML: 10 INJECTION, SOLUTION EPIDURAL; INFILTRATION; INTRACAUDAL; PERINEURAL at 07:12

## 2017-08-16 RX ADMIN — ROCURONIUM BROMIDE 10 MG: 10 INJECTION INTRAVENOUS at 09:59

## 2017-08-16 RX ADMIN — ROCURONIUM BROMIDE 10 MG: 10 INJECTION INTRAVENOUS at 08:40

## 2017-08-16 RX ADMIN — ROCURONIUM BROMIDE 10 MG: 10 INJECTION INTRAVENOUS at 09:26

## 2017-08-16 RX ADMIN — DEXAMETHASONE SODIUM PHOSPHATE 4 MG: 4 INJECTION, SOLUTION INTRAMUSCULAR; INTRAVENOUS at 08:04

## 2017-08-16 RX ADMIN — SODIUM CHLORIDE, POTASSIUM CHLORIDE, SODIUM LACTATE AND CALCIUM CHLORIDE: 600; 310; 30; 20 INJECTION, SOLUTION INTRAVENOUS at 09:45

## 2017-08-16 RX ADMIN — TOPIRAMATE 200 MG: 200 TABLET, FILM COATED ORAL at 21:38

## 2017-08-16 RX ADMIN — SUGAMMADEX 457 MG: 100 INJECTION, SOLUTION INTRAVENOUS at 10:52

## 2017-08-16 RX ADMIN — SODIUM CHLORIDE: 9 INJECTION, SOLUTION INTRAVENOUS at 23:59

## 2017-08-16 RX ADMIN — HYDROMORPHONE HYDROCHLORIDE 0.5 MG: 1 INJECTION, SOLUTION INTRAMUSCULAR; INTRAVENOUS; SUBCUTANEOUS at 23:59

## 2017-08-16 RX ADMIN — FENTANYL CITRATE 100 MCG: 50 INJECTION, SOLUTION INTRAMUSCULAR; INTRAVENOUS at 07:34

## 2017-08-16 RX ADMIN — ONDANSETRON 4 MG: 2 INJECTION, SOLUTION INTRAMUSCULAR; INTRAVENOUS at 10:25

## 2017-08-16 RX ADMIN — FENTANYL CITRATE 25 MCG: 50 INJECTION, SOLUTION INTRAMUSCULAR; INTRAVENOUS at 09:39

## 2017-08-16 RX ADMIN — GABAPENTIN 600 MG: 300 CAPSULE ORAL at 21:38

## 2017-08-16 RX ADMIN — PROPOFOL 150 MG: 10 INJECTION, EMULSION INTRAVENOUS at 07:34

## 2017-08-16 RX ADMIN — FENTANYL CITRATE 100 MCG: 50 INJECTION, SOLUTION INTRAMUSCULAR; INTRAVENOUS at 11:08

## 2017-08-16 RX ADMIN — LIDOCAINE HYDROCHLORIDE 60 MG: 20 INJECTION, SOLUTION EPIDURAL; INFILTRATION; INTRACAUDAL; PERINEURAL at 07:34

## 2017-08-16 RX ADMIN — VANCOMYCIN HYDROCHLORIDE 1500 MG: 1 INJECTION, POWDER, LYOPHILIZED, FOR SOLUTION INTRAVENOUS at 18:51

## 2017-08-16 RX ADMIN — VANCOMYCIN HYDROCHLORIDE 1 G: 1 INJECTION, POWDER, LYOPHILIZED, FOR SOLUTION INTRAVENOUS at 07:42

## 2017-08-16 RX ADMIN — Medication 2 PUFF: at 21:14

## 2017-08-16 RX ADMIN — OXYCODONE HYDROCHLORIDE AND ACETAMINOPHEN 1 TABLET: 5; 325 TABLET ORAL at 21:38

## 2017-08-16 RX ADMIN — HYDROMORPHONE HYDROCHLORIDE 0.5 MG: 1 INJECTION, SOLUTION INTRAMUSCULAR; INTRAVENOUS; SUBCUTANEOUS at 11:40

## 2017-08-16 RX ADMIN — ROCURONIUM BROMIDE 10 MG: 10 INJECTION INTRAVENOUS at 09:01

## 2017-08-16 RX ADMIN — INSULIN GLARGINE 30 UNITS: 100 INJECTION, SOLUTION SUBCUTANEOUS at 22:32

## 2017-08-16 RX ADMIN — HYDROMORPHONE HYDROCHLORIDE 0.5 MG: 1 INJECTION, SOLUTION INTRAMUSCULAR; INTRAVENOUS; SUBCUTANEOUS at 16:14

## 2017-08-16 RX ADMIN — SODIUM CHLORIDE: 9 INJECTION, SOLUTION INTRAVENOUS at 13:41

## 2017-08-16 RX ADMIN — FENTANYL CITRATE 25 MCG: 50 INJECTION, SOLUTION INTRAMUSCULAR; INTRAVENOUS at 10:01

## 2017-08-16 RX ADMIN — FENTANYL CITRATE 25 MCG: 50 INJECTION, SOLUTION INTRAMUSCULAR; INTRAVENOUS at 09:17

## 2017-08-16 RX ADMIN — Medication 2 PUFF: at 21:32

## 2017-08-16 RX ADMIN — MIDAZOLAM HYDROCHLORIDE 2 MG: 1 INJECTION, SOLUTION INTRAMUSCULAR; INTRAVENOUS at 07:27

## 2017-08-16 RX ADMIN — SODIUM CHLORIDE, POTASSIUM CHLORIDE, SODIUM LACTATE AND CALCIUM CHLORIDE: 600; 310; 30; 20 INJECTION, SOLUTION INTRAVENOUS at 07:13

## 2017-08-16 ASSESSMENT — PAIN SCALES - GENERAL
PAINLEVEL_OUTOF10: 6
PAINLEVEL_OUTOF10: 9
PAINLEVEL_OUTOF10: 7
PAINLEVEL_OUTOF10: 10
PAINLEVEL_OUTOF10: 3
PAINLEVEL_OUTOF10: 8
PAINLEVEL_OUTOF10: 9
PAINLEVEL_OUTOF10: 10
PAINLEVEL_OUTOF10: 7
PAINLEVEL_OUTOF10: 9
PAINLEVEL_OUTOF10: 8
PAINLEVEL_OUTOF10: 9

## 2017-08-16 ASSESSMENT — PAIN DESCRIPTION - PAIN TYPE: TYPE: SURGICAL PAIN

## 2017-08-16 ASSESSMENT — PAIN - FUNCTIONAL ASSESSMENT: PAIN_FUNCTIONAL_ASSESSMENT: 0-10

## 2017-08-16 ASSESSMENT — PAIN DESCRIPTION - LOCATION: LOCATION: BACK

## 2017-08-16 NOTE — IP AVS SNAPSHOT
Patient Information     Patient Name MARGARET Cardenas 1970         This is your updated medication list to keep with you all times      TAKE these medications     albuterol sulfate  (90 Base) MCG/ACT inhaler   Inhale 2 puffs into the lungs every 6 hours as needed for Wheezing or Shortness of Breath       aspirin 81 MG tablet       * atorvastatin 40 MG tablet   Commonly known as:  LIPITOR   Take 1 tablet by mouth daily       * atorvastatin 40 MG tablet   Commonly known as:  LIPITOR   TAKE 1 TABLET BY MOUTH DAILY       Blood Pressure Kit   Diagnosis  Hypertension       carvedilol 6.25 MG tablet   Commonly known as:  COREG   TAKE 1 TABLET BY MOUTH TWICE DAILY       dicyclomine 20 MG tablet   Commonly known as:  BENTYL   TAKE 1 TABLET BY MOUTH THREE TIMES DAILY AS NEEDED       fluticasone-salmeterol 45-21 MCG/ACT inhaler   Commonly known as:  ADVAIR HFA   Inhale 1 puff into the lungs 2 times daily       furosemide 40 MG tablet   Commonly known as:  LASIX   TAKE 1 TABLET BY MOUTH EVERY DAY       gabapentin 600 MG tablet   Commonly known as:  NEURONTIN   Take 1 tablet by mouth 3 times daily       glipiZIDE 5 MG tablet   Commonly known as:  GLUCOTROL   TAKE 1 TABLET BY MOUTH TWICE DAILY       glucose blood VI test strips strip   Commonly known as:  ONE TOUCH ULTRA TEST   TEST 4 TIMES DAILY AS NEEDED DX:E11.65       insulin detemir 100 UNIT/ML injection vial   Commonly known as:  LEVEMIR   INJECT 40 UNITS SUBCUTANEOUS EVERY EVENING       insulin lispro 100 UNIT/ML injection vial   Commonly known as:  HUMALOG   Sig: Inject 10 Units into the skin 3 times daily (before meals).  (replacing Novolog rx)       INSULIN SYRINGE 1CC/31GX5/16\" 31G X 5/16\" 1 ML Misc   USE FOUR TIMES DAILY WITH INSULIN AS NEEDED       IRON PO       lidocaine 5 %   Commonly known as:  LIDODERM   Place 1 patch onto the skin daily 12 hours on, 12 hours off.       meloxicam 15 MG tablet   Commonly known as:  MOBIC TAKE 1 TABLET BY MOUTH DAILY       metFORMIN 1000 MG tablet   Commonly known as:  GLUCOPHAGE   TAKE 1 TABLET BY MOUTH TWICE DAILY WITH A MEAL       nitroGLYCERIN 0.4 MG SL tablet   Commonly known as:  NITROSTAT   Place 1 tablet under the tongue every 5 minutes as needed for Chest pain       omeprazole 40 MG delayed release capsule   Commonly known as:  PRILOSEC   TAKE ONE CAPSULE BY MOUTH EVERY DAY       ONE TOUCH ULTRASOFT LANCETS Misc   Use TID as Directed       oxyCODONE-acetaminophen 5-325 MG per tablet   Commonly known as:  PERCOCET   Take 1 tablet by mouth every 6 hours as needed for Pain .       potassium chloride 20 MEQ packet   Commonly known as:  KLOR-CON   Take 20 mEq by mouth 2 times daily       spironolactone 25 MG tablet   Commonly known as:  ALDACTONE   TAKE 1 TABLET BY MOUTH DAILY       therapeutic multivitamin-minerals tablet       tiZANidine 2 MG tablet   Commonly known as:  ZANAFLEX   TAKE 1 TABLET BY MOUTH EVERY EVENING AS NEEDED FOR PAIN OR SPASMS       topiramate 200 MG tablet   Commonly known as:  TOPAMAX       vitamin D 61198 units Caps capsule   Commonly known as:  ERGOCALCIFEROL   TAKE 1 CAPSULE BY MOUTH EVERY WEEK       Vitamin D3 5000 units Tabs   Take 1 Units by mouth daily       zolpidem 10 MG tablet   Commonly known as:  AMBIEN   TAKE ONE TABLET BY MOUTH EVERY NIGHT AT BEDTIME AS NEEDED       * Notice: This list has 2 medication(s) that are the same as other medications prescribed for you. Read the directions carefully, and ask your doctor or other care provider to review them with you.

## 2017-08-16 NOTE — IP AVS SNAPSHOT
These are medications you told us you were taking at home, CONTINUE taking them after you leave the hospital        Last Dose    Next Dose Due AM NOON PM NIGHT    albuterol sulfate  (90 Base) MCG/ACT inhaler   Inhale 2 puffs into the lungs every 6 hours as needed for Wheezing or Shortness of Breath                2 puffs on 8/17/2017  1:45 PM     AS NEEDED                        aspirin 81 MG tablet   Take 81 mg by mouth daily.                   8/18/17                          atorvastatin 40 MG tablet   Commonly known as:  LIPITOR   Take 1 tablet by mouth daily                40 mg on 8/17/2017  9:10 AM     8/17/17                          atorvastatin 40 MG tablet   Commonly known as:  LIPITOR   TAKE 1 TABLET BY MOUTH DAILY                40 mg on 8/17/2017  9:10 AM                            Blood Pressure Kit   Diagnosis  Hypertension                                         carvedilol 6.25 MG tablet   Commonly known as:  COREG   TAKE 1 TABLET BY MOUTH TWICE DAILY                6.25 mg on 8/17/2017  9:10 AM     8/17/17                          fluticasone-salmeterol 45-21 MCG/ACT inhaler   Commonly known as:  ADVAIR HFA   Inhale 1 puff into the lungs 2 times daily                  8/17/17                          furosemide 40 MG tablet   Commonly known as:  LASIX   TAKE 1 TABLET BY MOUTH EVERY DAY                40 mg on 8/17/2017  9:10 AM     8/18/17                          gabapentin 600 MG tablet   Commonly known as:  NEURONTIN   Take 1 tablet by mouth 3 times daily                  8/17/17                          glipiZIDE 5 MG tablet   Commonly known as:  GLUCOTROL   TAKE 1 TABLET BY MOUTH TWICE DAILY                  8/17/17                          glucose blood VI test strips strip   Commonly known as:  ONE TOUCH ULTRA TEST   TEST 4 TIMES DAILY AS NEEDED DX:E11.65                                         insulin detemir 100 UNIT/ML injection vial   Commonly known as:  LEVEMIR INJECT 40 UNITS SUBCUTANEOUS EVERY EVENING                                         insulin lispro 100 UNIT/ML injection vial   Commonly known as:  HUMALOG   Sig: Inject 10 Units into the skin 3 times daily (before meals). (replacing Novolog rx)                  8/17/17               With dinner           INSULIN SYRINGE 1CC/31GX5/16\" 31G X 5/16\" 1 ML Misc   USE FOUR TIMES DAILY WITH INSULIN AS NEEDED                                         IRON PO   Take 1 tablet by mouth                  8/18/17                          lidocaine 5 %   Commonly known as:  LIDODERM   Place 1 patch onto the skin daily 12 hours on, 12 hours off.                  8/18/17                          metFORMIN 1000 MG tablet   Commonly known as:  GLUCOPHAGE   TAKE 1 TABLET BY MOUTH TWICE DAILY WITH A MEAL                  8/17/17               With dinner           nitroGLYCERIN 0.4 MG SL tablet   Commonly known as:  NITROSTAT   Place 1 tablet under the tongue every 5 minutes as needed for Chest pain                                         omeprazole 40 MG delayed release capsule   Commonly known as:  PRILOSEC   TAKE ONE CAPSULE BY MOUTH EVERY DAY                  8/18/17                          ONE TOUCH ULTRASOFT LANCETS Misc   Use TID as Directed                                         potassium chloride 20 MEQ packet   Commonly known as:  KLOR-CON   Take 20 mEq by mouth 2 times daily                  8/17/17                          therapeutic multivitamin-minerals tablet   Take 1 tablet by mouth daily.                   8/18/17                          tiZANidine 2 MG tablet   Commonly known as:  ZANAFLEX   TAKE 1 TABLET BY MOUTH EVERY EVENING AS NEEDED FOR PAIN OR SPASMS                  AS NEEDED                       topiramate 200 MG tablet   Commonly known as:  TOPAMAX   Take 200 mg by mouth 2 times daily Uses for HA and seizures                200 mg on 8/17/2017  9:10 AM     8/18/17 vitamin D 20332 units Caps capsule   Commonly known as:  ERGOCALCIFEROL   TAKE 1 CAPSULE BY MOUTH EVERY WEEK                  8/18/17                          Vitamin D3 5000 units Tabs   Take 1 Units by mouth daily                  8/18/17                          zolpidem 10 MG tablet   Commonly known as:  AMBIEN   TAKE ONE TABLET BY MOUTH EVERY NIGHT AT BEDTIME AS NEEDED                  8/17/17                         These are the medications you have told us you were taking at home, STOP taking them after you leave the hospital     butalbital-acetaminophen-caffeine -40 MG per tablet   Commonly known as:  FIORICET, ESGIC       diclofenac 50 MG EC tablet   Commonly known as:  VOLTAREN       oxyCODONE 5 MG immediate release tablet   Commonly known as:  Mary Ellen Duenas            Where to Get Your Medications      You can get these medications from any pharmacy     Bring a paper prescription for each of these medications     oxyCODONE-acetaminophen 5-325 MG per tablet               Allergies as of 8/17/2017        Reactions    Heparin Shortness Of Breath    Pcn [Penicillins] Shortness Of Breath      Immunizations as of 8/17/2017     Name Date Dose VIS Date Route    Hepatitis A 7/21/2010 -- -- --    Hepatitis A 1/10/2006 -- -- --    Influenza Virus Vaccine 9/8/2015 0.5 mL 8/7/2015 Intramuscular    Comment: [id number]    Influenza Virus Vaccine 10/22/2014 -- -- --    External: Patient reported    Influenza Virus Vaccine 11/23/2013 -- -- --    External: Patient reported    Influenza Virus Vaccine 10/20/2004 -- -- --    Influenza, Quadrivalent, 3 Years and above,IM 10/25/2016 0.5 mL 10/25/2016 Intramuscular    Pneumococcal Conjugate 7-valent 11/23/2013 -- -- --    External: Patient reported    Pneumococcal Polysaccharide (Btstgorui28) 1/23/2017 0.5 mL 4/24/2015 Intramuscular      Last Vitals          Most Recent Value    Temp  98.1 °F (36.7 °C)    Pulse  90    Resp  18    BP  103/65 After Visit Summary    This summary was created for you. Thank you for entrusting your care to us. The following information includes details about your hospital/visit stay along with steps you should take to help with your recovery once you leave the hospital.  In this packet, you will find information about the topics listed below:    · Instructions about your medications including a list of your home medications  · A summary of your hospital visit  · Follow-up appointments once you have left the hospital  · Your care plan at home      You may receive a survey regarding the care you received during your stay. Your input is valuable to us. We encourage you to complete and return your survey in the envelope provided. We hope you will choose us in the future for your healthcare needs. Patient Information     Patient Name MARGARET Hastings 1970      Care Provided at:     Name Address Phone       [address] [id number] [phone]            Your Visit    Here you will find information about your visit, including the reason for your visit. Please take this sheet with you when you visit your doctor or other health care provider in the future. It will help determine the best possible medical care for you at that time. If you have any questions once you leave the hospital, please call the department phone number listed below.         Why you were here     Your primary diagnosis was:  Lumbar Canal Stenosis    Your diagnoses also included:  Copd (Chronic Obstructive Pulmonary Disease) ([facility]), Asthma, Gerd (Gastroesophageal Reflux Disease), Dm Type 2 With Diabetic Peripheral Neuropathy ([facility]), Essential Hypertension, Gastroesophageal Reflux Disease Without Esophagitis, Morbid Obesity Due To Excess Calories ([facility]), Osteoarthritis Of Spine With Radiculopathy, Thoracolumbar Region, Vitamin D Deficiency, Epidural Lipomatosis      Visit Information [address] [place] [address]         Preventive Care        Date Due    Pap Smear 7/10/1990    Diabetic Foot Exam 7/16/2017    Yearly Flu Vaccine (1) 8/1/2017    Tetanus Combination Vaccine (1 - Tdap) 8/22/2017 (Originally 7/10/1988)    Urine Check For Kidney Problems 1/21/2018    Hemoglobin A1C (Test For Long-Term Glucose Control) 6/24/2018    Cholesterol Screening 6/24/2018    Eye Exam By An Eye Doctor 7/15/2018                 Care Plan Once You Return Home    This section includes instructions you will need to follow once you leave the hospital.  Your care team will discuss these with you, so you and those caring for you know how to best care for your health needs at home. This section may also include educational information about certain health topics that may be of help to you. Discharge Instructions         Medication given may have significant effects after discharge. Therefore on the day of surgery:  1) you should be accompanied by a responsible adult upon discharge and for 24 hours after surgery. Do not drive a motor vehicle, operate machinery, power tools or appliance, drink alcoholic beverages, or make critical decisions for 24 hours  2) Be aware of dizziness, which may cause a fall. Change positions slowly. 3) Eating: you may resume your regular diet but it is better to increase intake slowly with mild foods and working up to your regular diet. 4) Nausea/Vomiting: Nausea and vomiting may occur as you become more active or begin to increase food intake. If this should happen, decrease activity and return to liquids. 5) Pain: Your surgeon may have given you a prescription for pain medication. Take pain medication with food as prescribed. Pain medication may cause constipation, so drink plenty of fluids. You may need to use laxatives. 6) Ice: You may use a cool pack to operative site for 20 min 5-6 times a day as needed for comfort. Mental Status:  oriented, alert, coherent, logical, thought processes intact and able to concentrate and follow conversation     IV Access:  - None    Nursing Mobility/ADLs:  Walking   Assisted  Transfer  Assisted  Bathing  Assisted  Dressing  Assisted  Toileting  Assisted  Feeding  Independent  Med [address] [place] [address] Delivery   whole    Wound Care Documentation and Therapy:  Incision 08/16/17 Back (Active)   Number of days:0        Elimination:  Continence:   · Bowel: Yes  · Bladder: Yes  Urinary Catheter: None   Colostomy/Ileostomy/Ileal Conduit: No    Date of Last BM: 8/16/17    Intake/Output Summary (Last 24 hours) at 08/16/17 1058  Last data filed at 08/16/17 1028   Gross per 24 hour   Intake             2000 ml   Output             1250 ml   Net              750 ml          Safety Concerns: At Risk for Falls    Impairments/Disabilities:      impaired mobility, unsteady gait     Nutrition Therapy:  Current Nutrition Therapy:   - Oral Diet:  Carb Control 4 carbs/meal (1800kcals/day)    Routes of Feeding: Oral  Liquids: Thin Liquids  Daily Fluid Restriction: no  Last Modified Barium Swallow with Video (Video Swallowing Test): not done    Treatments at the Time of Hospital Discharge:   Respiratory Treatments: incentive spirometer  Oxygen Therapy:  is not on home oxygen therapy.   Ventilator:    - No ventilator support    Rehab Therapies: Physical Therapy and Occupational Therapy  Weight Bearing Status/Restrictions: No weight bearing restirctions  Other Medical Equipment (for information only, NOT a DME order):  walker  Other Treatments: SCDs, Dressing changes    Patient's personal belongings (please select all that are sent with patient):  all personal belongings sent with patient     RN SIGNATURE:  Electronically signed by Archana Feliciano RN on 8/17/17 at 11:14 AM    PHYSICIAN SECTION    Prognosis: {Prognosis:2278413800}    Condition at Discharge: [facility] Patient Condition:[id number]} Rehab Potential (if transferring to Rehab): {Prognosis:3957305337}    Recommended Labs or Other Treatments After Discharge: ***    Physician Certification: I certify the above information and transfer of Simran Valverde  is necessary for the continuing treatment of the diagnosis listed and that she requires {Admit to Appropriate Level of Care:44956} for {GREATER/LESS:007677317} 30 days. Update Admission H&P: {CHP DME Changes in LQJEP:902652255}    PHYSICIAN SIGNATURE:  Electronically signed by Anita Alicea MD on 8/16/17 at 10:58 AM    CASE MANAGEMENT/SOCIAL WORK SECTION    Inpatient Status Date: ***    [facility] Readmission Risk Assessment Score:      (Score > 14= high risk for readmission)    Discharging to Facility/ Agency   · Name: [address] [place] [address]  · Address:  · Phone:[phone]  · Fax:    Dialysis Facility (if applicable)   · Name:  · Address:  · Dialysis Schedule:  · Phone:  · Fax:    / signature: {Esignature:[id number]}. Electronically signed by Hillary Dakin, MSW on 8/17/2017 at 11:10 AM      Diet Instructions     ? Good nutrition is important when healing from an illness, injury, or surgery. Follow any nutrition recommendations given to you during your hospital stay. ? If you were given an oral nutrition supplement while in the hospital, continue to take this supplement at home. You can take it with meals, in-between meals, and/or before bedtime. These supplements can be purchased at most local grocery stores, pharmacies, and chain super-stores. ? If you have any questions about your diet or nutrition, call the hospital and ask for the dietitian. Important information for a smoker       SMOKING: QUIT SMOKING. THIS IS THE MOST IMPORTANT ACTION YOU CAN TAKE TO IMPROVE YOUR CURRENT AND FUTURE HEALTH. Call the [facility] [place] NOW ([phone])    Smoking harms nonsmokers.  When nonsmokers are around people who smoke, they absorb nicotine, carbon monoxide, and other ingredients of tobacco smoke. DO NOT SMOKE AROUND CHILDREN     Children exposed to secondhand smoke are at an increased risk of:  Sudden Infant Death Syndrome (SIDS), acute respiratory infections, inflammation of the middle ear, and severe asthma. Over a longer time, it causes heart disease and lung cancer. There is no safe level of exposure to secondhand smoke. MyChart Signup     Our records indicate that you have an active [employer] account. You can view your After Visit Summary by going to [email or web address] and logging in with your [employer] username and password. If you don't have a [employer] username and password but a parent or guardian has access to your record, the parent or guardian should login with their own [employer] username and password and access your record to view the After Visit Summary. Additional Information  If you have questions, please contact the physician practice where you receive care. Remember, [employer] is NOT to be used for urgent needs. For medical emergencies, dial 911. For questions regarding your [employer] account call [phone]. If you have a clinical question, please call your doctor's office. View your information online  ? Review your current list of  medications, immunization, and allergies. ? Review your future test results online . ? Review your discharge instructions provided by your caregivers at discharge    Certain functionality such as prescription refills, scheduling appointments or sending messages to your provider are not activated if your provider does not use [employer] in his/her office    For questions regarding your [employer] account call [phone]. If you have a clinical question, please call your doctor's office.          The information on all pages of the After Visit Summary has been reviewed

## 2017-08-16 NOTE — CONSULTS
 UPPER GASTROINTESTINAL ENDOSCOPY  1/6/14    DR. FRYE    UPPER GASTROINTESTINAL ENDOSCOPY  3/24/15    [facility]       Prior to Admission medications    Medication Sig Start Date End Date Taking? Authorizing Provider   oxyCODONE (ROXICODONE) 5 MG immediate release tablet Take 1 tablet by mouth 2 times daily as needed for Pain . Earliest Fill Date: 8/5/17 8/5/17 9/4/17 Yes Jessica Oliver CNP   carvedilol (COREG) 6.25 MG tablet TAKE 1 TABLET BY MOUTH TWICE DAILY 7/23/17  Yes Carson Otto MD   glipiZIDE (GLUCOTROL) 5 MG tablet TAKE 1 TABLET BY MOUTH TWICE DAILY 6/12/17  Yes Carson Otto MD   potassium chloride (KLOR-CON) 20 MEQ packet Take 20 mEq by mouth 2 times daily 1/23/17  Yes Carson Otto MD   spironolactone (ALDACTONE) 25 MG tablet Take 1 tablet by mouth daily 1/23/17  Yes Carson Otto MD   meloxicam LOGAN LAGUERRE [facility]) 15 MG tablet Take 1 tablet by mouth daily 1/23/17  Yes Carson Otto MD   metFORMIN (GLUCOPHAGE) 1000 MG tablet TAKE 1 TABLET BY MOUTH TWICE DAILY WITH A MEAL 1/12/17  Yes Carson Otto MD   albuterol sulfate  (90 BASE) MCG/ACT inhaler Inhale 2 puffs into the lungs every 6 hours as needed for Wheezing or Shortness of Breath 10/25/16  Yes Carson Otto MD   fluticasone-salmeterol ([facility]) 65-56 MCG/ACT inhaler Inhale 1 puff into the lungs 2 times daily 10/25/16  Yes Carson Otto MD   insulin detemir (LEVEMIR) 100 UNIT/ML injection vial INJECT 40 UNITS SUBCUTANEOUS EVERY EVENING 10/25/16  Yes Carson Otto MD   insulin lispro (HUMALOG) 100 UNIT/ML injection vial Sig: Inject 10 Units into the skin 3 times daily (before meals).  (replacing Novolog rx) 10/25/16  Yes Carson Otto MD   atorvastatin (LIPITOR) 40 MG tablet Take 1 tablet by mouth daily 7/16/16  Yes Carson Otto MD   IRON PO Take 1 tablet by mouth   Yes Historical Provider, MD   topiramate (TOPAMAX) 200 MG tablet Take 200 mg by mouth 2 times daily Uses for HA and seizures 9/2/15  Yes Historical Provider, MD   tiZANidine and rhythm, normal S1 and S2, no S3 or S4, and no murmur noted  ABDOMEN:  No scars, normal bowel sounds, soft, non-distended, non-tender, no masses palpated, no hepatosplenomegally  MUSCULOSKELETAL:  There is no redness, warmth, or swelling of the joints. Full range of motion noted. Motor strength is 5 out of 5 all extremities bilaterally. Tone is normal.  NEUROLOGIC:  Awake, alert, oriented to name, place and time. Cranial nerves II-XII are grossly intact. Motor is 5 out of 5 bilaterally. Cerebellar finger to nose, heel to shin intact. Sensory is intact. Babinski down going, Romberg negative, and gait is normal.  SKIN:  no bruising or bleeding, no lesions and no jaundice    Labs:  Recent Results (from the past 24 hour(s))   POCT Glucose    Collection Time: 08/16/17  7:06 AM   Result Value Ref Range    POC Glucose 121 (H) 60 - 115 mg/dl    Performed on ACCU-CHEK    POCT Glucose    Collection Time: 08/16/17 11:12 AM   Result Value Ref Range    POC Glucose 122 (H) 60 - 115 mg/dl    Performed on ACCU-CHEK      Assessment/Plan:  1. S/p L3-S1 decompression with epidural mass removal per Dr. Elinor Hermosillo on 8/16/17  2. COPD with out exac, albuterol PRN  3. DM2-controlled- detemir 30 units at bedtime and humalog 6 units with meals plus sliding scale correction  4. Hep-C  5. Asthma with out exac, resume albuterol PRN    Electronically signed by CHELITA Lee on 8/16/17 at 1:44 PM      Attending Supervising Physician's HAM [address]  I performed a history and physical examination on the patient and discussed the management with the physician assistant. I reviewed and agree with the findings and plan as documented in his note    .     Electronically signed by Khang Krause MD on 8/16/17 at 8:56 PM

## 2017-08-16 NOTE — H&P
Patient:  Gamaliel Cho  YOB: 1970  Date: 2017     The patient is a 51 y.o. female who presents today for evaluation of the following problems:          Chief Complaint   Patient presents with    Back Pain         Referred by No ref. provider found     HISTORY OF PRESENT ILLNESS:     She has not tried physical therapy. Date of last of last PT treatment: none            Estimated body mass index is 47.61 kg/(m^2) as calculated from the following:    Height as of this encounter: 5' 1\" (1.549 m). Weight as of this encounter: 252 lb (114.3 kg).      PAST MEDICAL, FAMILY AND SOCIAL HISTORY:   Past Medical History         Past Medical History:   Diagnosis Date    Abnormal cardiovascular stress test 2015    Anemia      Anxiety disorder      Arthritis      Asthma      Chest pain 11/3/2014    Chronic hepatitis B ([address] 2013    Chronic hepatitis C without hepatic coma (HCC) 2013    Chronic low back pain      Cirrhosis (HCC)      COPD (chronic obstructive pulmonary disease) (HCC)      Dizziness 2015    DM type 2 with diabetic peripheral neuropathy (HCC) 2016    GERD (gastroesophageal reflux disease)      Gout      Headache 2015    Headache 2015    Hepatitis C      History of peptic ulcer disease      History of seizures      History of TIA (transient ischemic attack)      Hyperlipidemia      Hypertension      Obesity      Psoriasis      SOB (shortness of breath) 2015    Type II or unspecified type diabetes mellitus without mention of complication, not stated as uncontrolled            Past Surgical History          Past Surgical History:   Procedure Laterality Date    ABDOMINAL HERNIA REPAIR        BREAST BIOPSY Bilateral      BREAST SURGERY Bilateral 2008     nipple and milk gland removal    CARPAL TUNNEL RELEASE Bilateral       SECTION        CHOLECYSTECTOMY        FINGER TRIGGER RELEASE Bilateral      HYSTERECTOMY        KNEE ARTHROSCOPY Bilateral      UPPER GASTROINTESTINAL ENDOSCOPY   1/6/14     DR. FRYE    UPPER GASTROINTESTINAL ENDOSCOPY   3/24/15     [facility]          Family History          Family History   Problem Relation Age of Onset    Family history unknown: Yes                Current Outpatient Prescriptions on File Prior to Visit   Medication Sig Dispense Refill    meloxicam (MOBIC) 15 MG tablet TAKE 1 TABLET BY MOUTH DAILY 90 tablet 1    furosemide (LASIX) 40 MG tablet TAKE 1 TABLET BY MOUTH EVERY DAY 90 tablet 1    atorvastatin (LIPITOR) 40 MG tablet TAKE 1 TABLET BY MOUTH DAILY 90 tablet 1    glipiZIDE (GLUCOTROL) 5 MG tablet TAKE 1 TABLET BY MOUTH TWICE DAILY 180 tablet 1    gabapentin (NEURONTIN) 600 MG tablet Take 1 tablet by mouth 3 times daily 90 tablet 0    zolpidem (AMBIEN) 10 MG tablet TAKE ONE TABLET BY MOUTH EVERY NIGHT AT BEDTIME AS NEEDED 20 tablet 2    oxyCODONE (ROXICODONE) 5 MG immediate release tablet Take 1 tablet by mouth 2 times daily as needed for Pain .  60 tablet 0    vitamin D (ERGOCALCIFEROL) 40408 UNITS CAPS capsule TAKE 1 CAPSULE BY MOUTH EVERY WEEK 12 capsule 1    spironolactone (ALDACTONE) 25 MG tablet TAKE 1 TABLET BY MOUTH DAILY 90 tablet 1    tiZANidine (ZANAFLEX) 2 MG tablet TAKE 1 TABLET BY MOUTH EVERY EVENING AS NEEDED FOR PAIN OR SPASMS 30 tablet 0    omeprazole (PRILOSEC) 40 MG delayed release capsule TAKE ONE CAPSULE BY MOUTH EVERY DAY 30 capsule 5    dicyclomine (BENTYL) 20 MG tablet TAKE 1 TABLET BY MOUTH THREE TIMES DAILY AS NEEDED 60 tablet 3    glucose blood VI test strips (ONE TOUCH ULTRA TEST) strip TEST 4 TIMES DAILY AS NEEDED DX:E11.65 300 strip 3    Blood Pressure KIT Diagnosis  Hypertension 1 kit 0    carvedilol (COREG) 6.25 MG tablet Take 1 tablet by mouth 2 times daily 60 tablet 3    diclofenac (VOLTAREN) 50 MG EC tablet Take 1 tablet by mouth 3 times daily Take with food, avoid other NSAIDs (Ibuprofen) and steroids 90 tablet 0    lidocaine (LIDODERM) 5 % Place 1 patch onto the skin daily 12 hours on, 12 hours off. 30 patch 5    potassium chloride (KLOR-CON) 20 MEQ packet Take 20 mEq by mouth 2 times daily 180 each 1    spironolactone (ALDACTONE) 25 MG tablet Take 1 tablet by mouth daily 90 tablet 1    meloxicam (MOBIC) 15 MG tablet Take 1 tablet by mouth daily 90 tablet 1    metFORMIN (GLUCOPHAGE) 1000 MG tablet TAKE 1 TABLET BY MOUTH TWICE DAILY WITH A MEAL 180 tablet 2    butalbital-acetaminophen-caffeine (FIORICET, ESGIC) -40 MG per tablet Take 1 tablet by mouth 2 times daily as needed         albuterol sulfate  (90 BASE) MCG/ACT inhaler Inhale 2 puffs into the lungs every 6 hours as needed for Wheezing or Shortness of Breath 1 Inhaler 6    fluticasone-salmeterol (ADVAIR HFA) 45-21 MCG/ACT inhaler Inhale 1 puff into the lungs 2 times daily 3 Inhaler 1    dicyclomine (BENTYL) 20 MG tablet TAKE 1 TABLET BY MOUTH THREE TIMES DAILY AS NEEDED 60 tablet 3    insulin detemir (LEVEMIR) 100 UNIT/ML injection vial INJECT 40 UNITS SUBCUTANEOUS EVERY EVENING 4 vial 1    insulin lispro (HUMALOG) 100 UNIT/ML injection vial Sig: Inject 10 Units into the skin 3 times daily (before meals).  (replacing Novolog rx) 9 vial 1    Insulin Syringe-Needle U-100 (INSULIN SYRINGE 1CC/31GX5/16\") 31G X 5/16\" 1 ML MISC USE FOUR TIMES DAILY WITH INSULIN AS NEEDED 400 each 1    atorvastatin (LIPITOR) 40 MG tablet Take 1 tablet by mouth daily 90 tablet 1    IRON PO Take 1 tablet by mouth        topiramate (TOPAMAX) 200 MG tablet Take 200 mg by mouth 2 times daily         nitroGLYCERIN (NITROSTAT) 0.4 MG SL tablet Place 1 tablet under the tongue every 5 minutes as needed for Chest pain 25 tablet 0    ONE TOUCH ULTRASOFT LANCETS MISC Use TID as Directed 300 each 3    Cholecalciferol (VITAMIN D3) 5000 UNITS TABS Take 1 Units by mouth daily 90 tablet 1    aspirin 81 MG tablet Take 81 mg by mouth daily.        Multiple Vitamins-Minerals (THERAPEUTIC MULTIVITAMIN-MINERALS) tablet Take 1 tablet by mouth daily.          No current facility-administered medications on file prior to visit.             Social History   Substance Use Topics    Smoking status: Former Smoker       Packs/day: 0.50       Types: Cigarettes       Start date: 1/1/1997       Quit date: 1/1/2009    Smokeless tobacco: Never Used    Alcohol use No         ALLERGIES       Allergies   Allergen Reactions    Heparin Shortness Of Breath    Pcn [Penicillins] Shortness Of Breath         REVIEW OF SYSTEMS:  Review of Systems   Constitutional: Negative for fever. HENT: Negative for hearing loss. Respiratory: Positive for shortness of breath. Gastrointestinal: Negative for constipation, diarrhea and nausea. Genitourinary: Negative for difficulty urinating. Musculoskeletal: Positive for back pain. Negative for neck pain. Skin: Negative for rash. Neurological: Positive for headaches. Hematological: Bruises/bleeds easily. Psychiatric/Behavioral: Negative for sleep disturbance.         Physical Exam:        Vitals        Vitals:     06/29/17 1110   Temp: 96.7 °F (35.9 °C)   Weight: 252 lb (114.3 kg)   Height: 5' 1\" (1.549 m)                  Assessment and Plan      1. Morbid obesity due to excess calories ([address]    2. Arthritis of right knee    3. DM type 2 with diabetic peripheral neuropathy (HCC)    4. Paraparesis, bilateral ([address]    5. Lumbar canal stenosis    6. Epidural lipomatosis          Electromyography (EMG)/Nerve conduction studies (NCS) Report: Lower Extremity      Name: Saad Eller  YOB: 1970  Date of Service: 6/16/17  Provider: Guy Stanley MD   SUMMARY:  This study is limited, but normal. Although limited, there is no current electrodiagnostic evidence for an active lumbosacral motor radiculopathy or generalized large fiber sensorimotor peripheral polyneuropathy.       RECOMMENDATIONS: Today's study does not explain the patient's leg pain.  Patient willing to undergo the risks.       I have gone ahead and discussed the procedure, indications and risks of L3 through S1 decompressive laminectomies. The risks are small but still present including even something serious like death, paralysis, sensory loss, loss of bladder or bowel control, pain, bleeding, infection, CSF leak, spinal instability, chance of recurrence and so forth. Surgery is not a guarantee of normalcy. We cannot undo any permanent damage already done, cannot change the course of any of her other medical diseases. I have discussed alternative procedures, risks and benefits. I have answered their questions. She would be a candidate for postoperative rehabilitation.       1. Morbid obesity due to excess calories ([address]    2. Arthritis of right knee    3. DM type 2 with diabetic peripheral neuropathy (HCC)    4. Paraparesis, bilateral ([address]    5. Lumbar canal stenosis    6. Epidural lipomatosis       Prescription for Percocet 5/325mg, number one hundred twenty (120), Sig: one four times a day as needed for pain.   OARRS reviewed.  Rito Monroy MD

## 2017-08-17 VITALS
TEMPERATURE: 98.1 F | HEIGHT: 61 IN | DIASTOLIC BLOOD PRESSURE: 65 MMHG | WEIGHT: 252 LBS | SYSTOLIC BLOOD PRESSURE: 103 MMHG | RESPIRATION RATE: 18 BRPM | BODY MASS INDEX: 47.58 KG/M2 | OXYGEN SATURATION: 98 % | HEART RATE: 90 BPM

## 2017-08-17 LAB
GLUCOSE BLD-MCNC: 105 MG/DL (ref 60–115)
GLUCOSE BLD-MCNC: 105 MG/DL (ref 60–115)
PERFORMED ON: NORMAL
PERFORMED ON: NORMAL

## 2017-08-17 PROCEDURE — G0378 HOSPITAL OBSERVATION PER HR: HCPCS

## 2017-08-17 PROCEDURE — G8988 SELF CARE GOAL STATUS: HCPCS

## 2017-08-17 PROCEDURE — 97162 PT EVAL MOD COMPLEX 30 MIN: CPT

## 2017-08-17 PROCEDURE — 6370000000 HC RX 637 (ALT 250 FOR IP): Performed by: INTERNAL MEDICINE

## 2017-08-17 PROCEDURE — 6370000000 HC RX 637 (ALT 250 FOR IP): Performed by: NEUROLOGICAL SURGERY

## 2017-08-17 PROCEDURE — 97535 SELF CARE MNGMENT TRAINING: CPT

## 2017-08-17 PROCEDURE — G8987 SELF CARE CURRENT STATUS: HCPCS

## 2017-08-17 PROCEDURE — 94640 AIRWAY INHALATION TREATMENT: CPT

## 2017-08-17 PROCEDURE — G8979 MOBILITY GOAL STATUS: HCPCS

## 2017-08-17 PROCEDURE — G8978 MOBILITY CURRENT STATUS: HCPCS

## 2017-08-17 PROCEDURE — 2700000000 HC OXYGEN THERAPY PER DAY

## 2017-08-17 PROCEDURE — 6360000002 HC RX W HCPCS: Performed by: NURSE PRACTITIONER

## 2017-08-17 PROCEDURE — 97116 GAIT TRAINING THERAPY: CPT

## 2017-08-17 PROCEDURE — 97167 OT EVAL HIGH COMPLEX 60 MIN: CPT

## 2017-08-17 PROCEDURE — 2580000003 HC RX 258: Performed by: NEUROLOGICAL SURGERY

## 2017-08-17 PROCEDURE — 6360000002 HC RX W HCPCS: Performed by: NEUROLOGICAL SURGERY

## 2017-08-17 RX ORDER — KETOROLAC TROMETHAMINE 30 MG/ML
30 INJECTION, SOLUTION INTRAMUSCULAR; INTRAVENOUS ONCE
Status: COMPLETED | OUTPATIENT
Start: 2017-08-17 | End: 2017-08-17

## 2017-08-17 RX ORDER — FENTANYL 25 UG/H
1 PATCH TRANSDERMAL
Status: DISCONTINUED | OUTPATIENT
Start: 2017-08-17 | End: 2017-08-17 | Stop reason: HOSPADM

## 2017-08-17 RX ORDER — OXYCODONE HYDROCHLORIDE AND ACETAMINOPHEN 5; 325 MG/1; MG/1
1 TABLET ORAL EVERY 6 HOURS PRN
Qty: 40 TABLET | Refills: 0 | Status: SHIPPED | OUTPATIENT
Start: 2017-08-17 | End: 2017-08-23 | Stop reason: SDUPTHER

## 2017-08-17 RX ADMIN — GABAPENTIN 600 MG: 300 CAPSULE ORAL at 09:10

## 2017-08-17 RX ADMIN — HYDROMORPHONE HYDROCHLORIDE 0.5 MG: 1 INJECTION, SOLUTION INTRAMUSCULAR; INTRAVENOUS; SUBCUTANEOUS at 06:38

## 2017-08-17 RX ADMIN — Medication 2 PUFF: at 09:04

## 2017-08-17 RX ADMIN — Medication 10 ML: at 10:13

## 2017-08-17 RX ADMIN — ASPIRIN 81 MG 81 MG: 81 TABLET ORAL at 09:10

## 2017-08-17 RX ADMIN — PANTOPRAZOLE SODIUM 40 MG: 40 TABLET, DELAYED RELEASE ORAL at 05:09

## 2017-08-17 RX ADMIN — HYDROMORPHONE HYDROCHLORIDE 0.5 MG: 1 INJECTION, SOLUTION INTRAMUSCULAR; INTRAVENOUS; SUBCUTANEOUS at 03:46

## 2017-08-17 RX ADMIN — SPIRONOLACTONE 25 MG: 25 TABLET, FILM COATED ORAL at 09:10

## 2017-08-17 RX ADMIN — OXYCODONE HYDROCHLORIDE AND ACETAMINOPHEN 1 TABLET: 5; 325 TABLET ORAL at 03:29

## 2017-08-17 RX ADMIN — ATORVASTATIN CALCIUM 40 MG: 40 TABLET, FILM COATED ORAL at 09:10

## 2017-08-17 RX ADMIN — DOCUSATE SODIUM 100 MG: 100 CAPSULE, LIQUID FILLED ORAL at 09:10

## 2017-08-17 RX ADMIN — FUROSEMIDE 40 MG: 40 TABLET ORAL at 09:10

## 2017-08-17 RX ADMIN — CARVEDILOL 6.25 MG: 6.25 TABLET, FILM COATED ORAL at 09:10

## 2017-08-17 RX ADMIN — TOPIRAMATE 200 MG: 200 TABLET, FILM COATED ORAL at 09:10

## 2017-08-17 RX ADMIN — KETOROLAC TROMETHAMINE 30 MG: 30 INJECTION, SOLUTION INTRAMUSCULAR at 09:16

## 2017-08-17 RX ADMIN — Medication 2 PUFF: at 13:45

## 2017-08-17 RX ADMIN — OXYCODONE HYDROCHLORIDE AND ACETAMINOPHEN 1 TABLET: 5; 325 TABLET ORAL at 09:16

## 2017-08-17 RX ADMIN — GABAPENTIN 600 MG: 300 CAPSULE ORAL at 14:25

## 2017-08-17 RX ADMIN — OXYCODONE HYDROCHLORIDE AND ACETAMINOPHEN 1 TABLET: 5; 325 TABLET ORAL at 15:28

## 2017-08-17 ASSESSMENT — PAIN SCALES - GENERAL
PAINLEVEL_OUTOF10: 6
PAINLEVEL_OUTOF10: 5
PAINLEVEL_OUTOF10: 5
PAINLEVEL_OUTOF10: 7
PAINLEVEL_OUTOF10: 5
PAINLEVEL_OUTOF10: 10
PAINLEVEL_OUTOF10: 8
PAINLEVEL_OUTOF10: 10
PAINLEVEL_OUTOF10: 5

## 2017-08-17 ASSESSMENT — PAIN DESCRIPTION - PAIN TYPE
TYPE: CHRONIC PAIN;SURGICAL PAIN
TYPE: CHRONIC PAIN;SURGICAL PAIN

## 2017-08-17 ASSESSMENT — PAIN DESCRIPTION - DESCRIPTORS: DESCRIPTORS: SHARP

## 2017-08-17 ASSESSMENT — PAIN DESCRIPTION - LOCATION: LOCATION: BACK

## 2017-08-17 NOTE — PROGRESS NOTES
Apartment  Home Layout: One level  Home Access: Stairs to enter with rails  Entrance Stairs - Number of Steps: 7  Home Equipment: Rolling walker  ADL Assistance: Independent  Homemaking Assistance: Independent  Ambulation Assistance: Independent [employer])  Transfer Assistance: Independent  Additional Comments: Spouse available to assist 24hr    OBJECTIVE:   Vision/Hearing:  Vision: Within Functional Limits  Hearing: Within functional limits    Cognition:  Overall Orientation Status: Within Normal Limits  Follows Commands: Within Functional Limits    Observation/Palpation  Observation: Obese female pt resting comfortably in supine position; incision dressed; IV connected. ROM:  RLE PROM: WFL  LLE PROM: WFL    Strength:  Strength Other  Other: Not formally assessed however observed pain limiting weakness throughout during mobility evaluation as follows:    Neuro:  Balance  Comments: Formal assessment deferred d/t pain; Pt limiting in ability to shift weight OBOS; pain response causing rigid posturing with impaired balance reaction     Motor Control  Gross Motor?: WFL  Sensation  Overall Sensation Status:  (denies numbness and tingling)    Bed mobility  Rolling to Right: Contact guard assistance  Supine to Sit: Contact guard assistance  Comment: Verbally instructed in logroll technique; Pt utilizing heavy UEs to pull from bedrails. Pain limiting.     Transfers  Sit to Stand: Contact guard assistance;Minimal Assistance  Stand to sit: Contact guard assistance;Stand by assistance  Comment: Buckling initially during STS with steadying assist to correct; heavy use of UEs on [employer]; decreased trunk flexion during transitions holding spine in rigid posturing    Ambulation 1  Surface: level tile  Device: Rolling Walker  Assistance: Contact guard assistance;Stand by assistance  Quality of Gait: Increased time to initiate advancement of LEs; slow evy with c/o pain throughout; braces heavily on [employer] however demonstrates no LOB or

## 2017-08-17 NOTE — PROGRESS NOTES
PHARMACY NOTE  Frdeeric Russo was ordered Advair. Per the [address], this medication is non-formulary and not stocked by pharmacy. Briana Show was substituted. The medication can be reordered at discharge.

## 2017-08-17 NOTE — PLAN OF CARE
Problem: IP BALANCE  Goal: LTG - patient will maintain standing balance to allow for completion of daily activities  Outcome: Ongoing  Max A

## 2017-08-17 NOTE — PROGRESS NOTES
Hospitalist Progress Note      PCP: Herb Sue MD    Date of Admission: 8/16/2017    Chief Complaint:  S/P lumbar fusion and decompression    Subjective: : Pt complaining from pain about 7/10. No SOB, CP,N,V, fever, Diarrhea or constipation reported. Medications:      Infusion Medications    sodium chloride 100 mL/hr at 08/16/17 2359    dextrose       Scheduled Medications    fentaNYL  1 patch Transdermal Q72H    sodium chloride flush  10 mL Intravenous 2 times per day    docusate sodium  100 mg Oral BID    aspirin  81 mg Oral Daily    atorvastatin  40 mg Oral Daily    carvedilol  6.25 mg Oral BID    furosemide  40 mg Oral Daily    gabapentin  600 mg Oral TID    insulin lispro  6 Units Subcutaneous TID WC    meloxicam  15 mg Oral Daily    spironolactone  25 mg Oral Daily    topiramate  200 mg Oral BID    mometasone-formoterol  2 puff Inhalation BID    insulin lispro  0-12 Units Subcutaneous TID WC    insulin lispro  0-6 Units Subcutaneous Nightly    insulin glargine  30 Units Subcutaneous Nightly    pantoprazole  40 mg Oral QAM AC    albuterol sulfate HFA  2 puff Inhalation TID     PRN Meds: sodium chloride flush, acetaminophen, oxyCODONE-acetaminophen, HYDROmorphone **OR** HYDROmorphone, ondansetron, nitroGLYCERIN, glucose, dextrose, glucagon (rDNA), dextrose, albuterol sulfate HFA      Intake/Output Summary (Last 24 hours) at 08/17/17 1029  Last data filed at 08/16/17 1907   Gross per 24 hour   Intake              800 ml   Output              690 ml   Net              110 ml       Exam:    /71  Pulse 84  Temp 98.4 °F (36.9 °C) (Oral)   Resp 18  Ht 5' 1\" (1.549 m)  Wt 252 lb (114.3 kg)  SpO2 97%  BMI 47.61 kg/m2    General appearance: In moderate pain, appears stated age and cooperative. HEENT: No jaundice. Conjunctivae/corneas clear. Neck: Supple, with full range of motion. No jugular venous distention. Trachea midline. Respiratory:  Normal respiratory effort.  Clear

## 2017-08-17 NOTE — PROGRESS NOTES
[facility] OCCUPATIONAL THERAPY EVALUATION - ACUTE   Room: N227/N227-01  Date: 2017  Patient Name: Jose Daniel Benz        MRN: [id number]  Account: [de-identified]   : 1970  (49 y.o.)    Chart Review:  Diagnosis:  There were no encounter diagnoses.  Lumbar stenosis with sx 17  Past Medical History:   Diagnosis Date    Abnormal cardiovascular stress test 2015    Anemia     Anxiety disorder     Arthritis     Asthma     Cerebral artery occlusion with cerebral infarction ([address]     TIZ x 3    Chest pain 11/3/2014    Chronic hepatitis B ([address] 2013    Chronic hepatitis C without hepatic coma (HCC) 2013    Chronic low back pain     Cirrhosis ([address]     COPD (chronic obstructive pulmonary disease) (HCC)     Dizziness 2015    DM type 2 with diabetic peripheral neuropathy ([address] 2016    DM type 2 with diabetic peripheral neuropathy (HCC) 2016    Epidural lipomatosis     GERD (gastroesophageal reflux disease)     Gout     Headache 2015    Headache 2015    Hepatitis C     History of blood transfusion ?    no reaction     History of peptic ulcer disease     History of seizures     History of TIA (transient ischemic attack)     Hyperlipidemia     Hypertension     Lumbar stenosis     Obesity     Psoriasis     SOB (shortness of breath) 2015    Type II or unspecified type diabetes mellitus without mention of complication, not stated as uncontrolled      Past Surgical History:   Procedure Laterality Date    ABDOMINAL HERNIA REPAIR      BREAST BIOPSY Bilateral     BREAST SURGERY Bilateral 2008    nipple and milk gland removal    CARPAL TUNNEL RELEASE Bilateral      SECTION      CHOLECYSTECTOMY      FINGER TRIGGER RELEASE Bilateral     HYSTERECTOMY      KNEE ARTHROSCOPY Bilateral     LUMBAR SPINE SURGERY N/A 2017    L3-S1 DECOMPRESSION REMOVAL EPIDURAL MASS   performed by Jaxon Morales MD at [place] [address] Mobility:  Toilet Transfers:  Max A to [facility] without AD  Bed Transfer:NT pt up in chair  Sit to stand: Min A  Bed to Chair:  NT up in chair     Seated Balance:      Static: [x] Good  [] Fair   [] Poor   Dynamic: []  Good  [x] Fair   [] Poor     Standing Balance:     Static: [] Good   [x] Fair  [] Poor   Dynamic: [] Good   [x] Fair   [] Poor     Functional Endurance: [] Good  [] Fair  [] Poor     ADLs  Feeding:  Ind  UE Dressing: Mod A  LB Dressing:  Dep  Bathing:  Max A  Toileting: Max A  Grooming: CGA    Goals:   Patient will:   [x]  Improve functional endurance to tolerate/complete 30 mins of ADL's   [x]  Be Ind in UB ADLs    [x]  Be Mod I in LB ADLs   [x]  Be Mod I in ADL transfers without LOB   [x]  Be Ind in toileting tasks   [x]  Improve B hand fine motor coordination to [facility] in order to manage clothing fasteners/self-care containers in a timely manner   [x]  Improve B UE Function (AROM, strength, motor control, tone normalization) to complete ADLs as projected. [x]  Improve B UE strength and endurance to [facility] in order to participate in self-care activities as projected. [x]  Access appropriate D/C site with as few architectural barriers as possible.       Treatment Plan will consist of:   [x] ADL Training   [x] Strengthening   [x] Endurance   [x] Transfer Training   [x]  DME ed      [] HEP  [] Manual Therapy   [] AROM/PROM    [] Coordination   [] Cognitive Training   [x]Safety training   [] Other :    Patient Goal: D/C with therapy ok  Discussed and agreed upon: [] Yes   [] No Comment:     Assessment/Discharge Disposition:     Performance deficits / Impairments: Decreased functional mobility , Decreased ADL status, Decreased strength, Decreased safe awareness, Decreased balance, Decreased endurance, Decreased high-level IADLs  Prognosis: Good  Discharge Recommendations: Continue to assess pending progress  History: multi comorb  Exam: 7 deficits  Assistance / Modification: Max A      Barriers to Improvement:

## 2017-08-17 NOTE — PROGRESS NOTES
[facility] [place] Respiratory Therapy Evaluation   Current Order:  PRN     Home Regimen: PRN      Ordering Physician: Funmilayo Bowers  Re-evaluation Date: na     Diagnosis:COPD      Patient Status: Stable / Unstable + Physician notified    The following MDI Criteria must be met in order to convert aerosol to MDI with spacer. If unable to meet, MDI will be converted to aerosol:  []  Patient able to demonstrate the ability to use MDI effectively  []  Patient alert and cooperative  []  Patient able to take deep breath with 5-10 second hold  []  Medication(s) available in this delivery method   []  Peak flow greater than or equal to 200 ml/min            Current Order Substituted To  (same drug, same frequency)   Aerosol to MDI [] Albuterol Sulfate 0.083% unit dose by aerosol Albuterol Sulfate MDI 2 puffs by inhalation with spacer    [] Levalbuterol 1.25 mg unit dose by aerosol Levalbuterol MDI 2 puffs by inhalation with spacer    [] Levalbuterol 0.63 mg unit dose by aerosol Levalbuterol MDI 2 puffs by inhalation with spacer    [] Ipratropium Bromide 0.02% unit dose by aerosol Ipratropium Bromide MDI 2 puffs by inhalation with spacer    [] Duoneb (Ipratropium + Albuterol) unit dose by aerosol Ipratropium MDI + Albuterol MDI 2 puffs by inhalation w/spacer   MDI to Aerosol [] Albuterol Sulfate MDI Albuterol Sulfate 0.083% unit dose by aerosol    [] Levalbuterol MDI 2 puffs by inhalation Levalbuterol 1.25 mg unit dose by aerosol    [] Ipratropium Bromide MDI by inhalation Ipratropium Bromide 0.02% unit dose by aerosol    [] Combivent (Ipratropium + Albuterol) MDI by inhalation Duoneb (Ipratropium + Albuterol) unit dose by aerosol   Treatment Assessment [Frequency/Schedule]:  Change frequency to: _TID albuterol_________________________________________________per Protocol, P&T, MEC      Points 0 1 2 3 4   Pulmonary Status  Non-Smoker  []   Smoking history   < 20 pack years  []   Smoking history  ?  20 pack years  []   Pulmonary Disorder  (acute or chronic)  [x]   Severe or Chronic w/ Exacerbation  []     Surgical Status No []   Surgeries     General [x]   Surgery Lower []   Abdominal Thoracic or []   Upper Abdominal Thoracic with  PulmonaryDisorder  []     Chest X-ray Clear/Not  Ordered     [x]  Chronic Changes  Results Pending  []  Infiltrates, atelectasis, pleural effusion, or edema  []  Infiltrates in more than one lobe []  Infiltrate + Atelectasis, &/or pleural effusion  []    Respiratory Pattern Regular,  RR = 12-20 [x]  Increased,  RR = 21-25 []  CROCKER, irregular,  or RR = 26-30 []  Decreased FEV1  or RR = 31-35 []  Severe SOB, use  of accessory muscles, or RR ? 35  []    Mental Status Alert, oriented,  Cooperative [x]  Confused but Follows commands []  Lethargic or unable to follow commands []  Obtunded  []  Comatose  []    Breath Sounds Clear to  auscultation  []  Decreased unilaterally or  in bases only [x]  Decreased  bilaterally  []  Crackles or intermittent wheezes []  Wheezes []    Cough Strong, Spontan., & nonproductive []  Strong,  spontaneous, &  productive [x]  Weak,  Nonproductive []  Weak, productive or  with wheezes []  No spontaneous  cough or may require suctioning []    Level of Activity Ambulatory []  Ambulatory w/ Assist  [x]  Non-ambulatory []  Paraplegic []  Quadriplegic []    Total    Score:__7_____     Triage Score:____7____      Tri       Triage:     1. (>20) Freq: Q3    2. (16-20) Freq: Q4   3. (11-15) Freq: QID & Albuterol Q2 PRN    4. (6-10) Freq: TID & Albuterol Q2 PRN    5. (0-5) Freq Q4prn

## 2017-08-17 NOTE — CARE COORDINATION
LSW met with Pt and spouse, they would like [facility]. Pt had been there before for a TIA. .. Gurinder Marks at [facility] looking into precert. Maame Ghotra Electronically signed by RADHA Kimble on 2017 at 10:18 AM

## 2017-08-17 NOTE — PROGRESS NOTES
([facility] 2016    DM type 2 with diabetic peripheral neuropathy ([facility] 2016    Epidural lipomatosis     GERD (gastroesophageal reflux disease)     Gout     Headache 2015    Headache 2015    Hepatitis C     History of blood transfusion ?    no reaction     History of peptic ulcer disease     History of seizures     History of TIA (transient ischemic attack)     Hyperlipidemia     Hypertension     Lumbar stenosis     Obesity     Psoriasis     SOB (shortness of breath) 2015    Type II or unspecified type diabetes mellitus without mention of complication, not stated as uncontrolled      Past Surgical History:   Procedure Laterality Date    ABDOMINAL HERNIA REPAIR      BREAST BIOPSY Bilateral     BREAST SURGERY Bilateral 2008    nipple and milk gland removal    CARPAL TUNNEL RELEASE Bilateral      SECTION      CHOLECYSTECTOMY      FINGER TRIGGER RELEASE Bilateral     HYSTERECTOMY      KNEE ARTHROSCOPY Bilateral     LUMBAR SPINE SURGERY N/A 2017    L3-S1 DECOMPRESSION REMOVAL EPIDURAL MASS   performed by Abelardo Hidalgo MD at [facility]  14      27 [facility] GASTROINTESTINAL ENDOSCOPY  3/24/15    [facility]         Restrictions  Restrictions/Precautions: Fall Risk  Position Activity Restriction  Spinal Precautions: Spinal ROM within tolerable range, 10# lifting restriction    Subjective   General  Chart Reviewed: Yes  Family / Caregiver Present: Yes  Pre Treatment Pain Screening  Pain at present: 5  Scale Used: Numeric Score  Intervention List: Patient able to continue with treatment    Pain Screening  Patient Currently in Pain: Yes     Pain Reassessment:   Pain Assessment  Pain Assessment: 0-10  Pain Level: 6  Pain Type: Chronic pain;Surgical pain  Pain Location: Back  Pain Descriptors: Sharp  Pain Intervention(s): Repositioned;RN notified (alreay medicated)       Orientation  Orientation  Overall Orientation Status: Within Training, Endurance Training, Manual Therapy - Soft Tissue Mobilization, Pain Management  Plan Comment: cont current POC  Safety Devices  Type of devices: Bed alarm in place, Left in bed, Call light within reach, Nurse notified     Therapy Time   Individual   Time In 1313   Time Out 1336   Minutes 23       Gait 15  Trsfs 8       Gabby Marley PTA, 08/17/17 at 1:35 PM

## 2017-08-17 NOTE — PATIENT CARE CONFERENCE
Spoke with patient. Pt continues to be in quite a bit of pain and stiffness at this time- which is hindering her PT. Dr Elsa Costello added fentalyn patch. Spoke with Dr Elsa Costello- and ok to add toradol to pain regime but not to change the PO doses at this time. Spoke with patient about SNF- in case her pain and therapy needs remain- Pt states she went to [address] before- and that is where she would like to go  if she needs to be placed out side of her home. Pt has a lot of stairs with her leg paresthesia at this time making it difficult to attempts stairs. Pt deos not have any bowel or bladder control issues, and no saddle beg anesthesia. pre-cert will be started for [facility].    Pt would most like to go home- with home health if need be

## 2017-08-17 NOTE — PROGRESS NOTES
PHARMACY NOTE  Alessio Aguilar was ordered Levemir. Per the [address], this medication is non-formulary and not stocked by pharmacy. Lantus was substituted. The medication can be reordered at discharge.

## 2017-08-18 NOTE — OP NOTE
[address]                               OPERATIVE REPORT    PATIENT NAME: Abimael Blank                                :       1970  MED REC NO:   [id number]                                       ROOM:  ACCOUNT NO:   [de-identified]                                     ADMISSION  DATE:  2017  PROVIDER:     Maria Guadalupe Mario MD    DATE OF PROCEDURE:  2017    PREOPERATIVE DIAGNOSIS:  L3, L4, L5 and S1 epidural lipomatosis with  canal stenosis. POSTOPERATIVE DIAGNOSIS:  L3, L4, L5 and S1 epidural lipomatosis with  canal stenosis. OPERATION PERFORMED:  Right bilateral L3, L4, L5 and S1 decompressive  laminectomies, removal epidural lipoma decompression. SURGEON:  Dr. Maria Guadalupe Mario. DESCRIPTION OF PROCEDURE:  The patient was given general endotracheal  anesthesia in the supine position. She was turned to the prone  position. The back was prepped and draped. Alberto catheter was  established. Vancomycin IV preoperatively. Time-out, patient  identified. Needle was placed. Lateral x-rays obtained to confirm  level. Needle was withdrawn. Skin incision was made from L3 through  L5. Dissection was carried down to the spinous process tips. The  patient was obese with a BMI of 47. This took extra effort and time  to dissect through the fatty tissue. The fascia was then excised. Because of the depth, I had to use extra long instrumentation, took  extra effort and work to visualize the depth of the pathology in the  spine. The needles were placed and lateral x-rays obtained to confirm  level and needles were withdrawn. The microscope brought to the field  and micro retractor was placed first at L5-S1 on the right side.   Very  carefully with microdissection a partial hemilaminectomy in the  inferior aspect of L5 was performed working superiorly and now  laterally detaching the ligamentum flavum from the

## 2017-08-21 NOTE — PROGRESS NOTES
Physical Therapy  Facility/Department: [facility] [address] MED SURG [id number]/[id number]  Physical Therapy Discharge      NAME: Govind Aranda    : 1970 (49 y.o.)  MRN: [id number]    Account: [de-identified]  Gender: female      Patient has been discharged from acute care hospital. DC patient from current PT program.      Electronically signed by Saira Holland PT on 17 at 4:42 PM

## 2017-08-22 LAB
AVERAGE GLUCOSE: NORMAL
HBA1C MFR BLD: 6 %

## 2017-08-23 ENCOUNTER — OFFICE VISIT (OUTPATIENT)
Dept: GERIATRIC MEDICINE | Age: 47
End: 2017-08-23

## 2017-08-23 DIAGNOSIS — M15.9 OSTEOARTHRITIS OF MULTIPLE JOINTS, UNSPECIFIED OSTEOARTHRITIS TYPE: ICD-10-CM

## 2017-08-23 DIAGNOSIS — R53.1 WEAKNESS: ICD-10-CM

## 2017-08-23 DIAGNOSIS — I10 ESSENTIAL HYPERTENSION: ICD-10-CM

## 2017-08-23 DIAGNOSIS — E11.9 TYPE 2 DIABETES MELLITUS WITHOUT COMPLICATION, UNSPECIFIED LONG TERM INSULIN USE STATUS: ICD-10-CM

## 2017-08-23 DIAGNOSIS — D17.79 EPIDURAL LIPOMATOSIS: Primary | ICD-10-CM

## 2017-08-23 PROCEDURE — 99306 1ST NF CARE HIGH MDM 50: CPT | Performed by: INTERNAL MEDICINE

## 2017-08-23 RX ORDER — ACETAMINOPHEN 325 MG/1
650 TABLET ORAL EVERY 4 HOURS PRN
COMMUNITY
End: 2020-04-08 | Stop reason: ALTCHOICE

## 2017-08-23 RX ORDER — ATORVASTATIN CALCIUM 40 MG/1
40 TABLET, FILM COATED ORAL DAILY
COMMUNITY
End: 2018-01-30 | Stop reason: SDUPTHER

## 2017-08-23 RX ORDER — POTASSIUM CHLORIDE 750 MG/1
20 CAPSULE, EXTENDED RELEASE ORAL 2 TIMES DAILY
COMMUNITY
End: 2018-11-19 | Stop reason: SDUPTHER

## 2017-08-23 RX ORDER — ZOLPIDEM TARTRATE 10 MG/1
10 TABLET ORAL NIGHTLY PRN
COMMUNITY
End: 2018-04-30 | Stop reason: SDUPTHER

## 2017-08-23 RX ORDER — ERGOCALCIFEROL 1.25 MG/1
50000 CAPSULE ORAL WEEKLY
COMMUNITY
End: 2017-11-11 | Stop reason: SDUPTHER

## 2017-08-23 RX ORDER — OMEPRAZOLE 20 MG/1
40 CAPSULE, DELAYED RELEASE ORAL DAILY
COMMUNITY
End: 2017-10-24 | Stop reason: SDUPTHER

## 2017-08-23 RX ORDER — MELOXICAM 15 MG/1
15 TABLET ORAL DAILY
COMMUNITY
End: 2017-10-24 | Stop reason: SDUPTHER

## 2017-08-23 RX ORDER — FERROUS SULFATE 325(65) MG
325 TABLET ORAL
Status: ON HOLD | COMMUNITY
End: 2020-09-04 | Stop reason: SDUPTHER

## 2017-08-23 RX ORDER — CARVEDILOL 12.5 MG/1
12.5 TABLET ORAL 2 TIMES DAILY
COMMUNITY
End: 2018-01-30 | Stop reason: DRUGHIGH

## 2017-08-23 RX ORDER — DICYCLOMINE HYDROCHLORIDE 10 MG/1
20 CAPSULE ORAL 3 TIMES DAILY PRN
COMMUNITY
End: 2017-10-24 | Stop reason: ALTCHOICE

## 2017-08-23 RX ORDER — FUROSEMIDE 40 MG/1
40 TABLET ORAL DAILY
COMMUNITY
End: 2017-11-29 | Stop reason: SDUPTHER

## 2017-08-23 RX ORDER — ALBUTEROL SULFATE 90 UG/1
2 AEROSOL, METERED RESPIRATORY (INHALATION) EVERY 6 HOURS PRN
COMMUNITY

## 2017-08-23 RX ORDER — LIDOCAINE 50 MG/G
1 PATCH TOPICAL DAILY
COMMUNITY
End: 2018-10-22 | Stop reason: SDUPTHER

## 2017-08-23 RX ORDER — TIZANIDINE 2 MG/1
2 TABLET ORAL DAILY
COMMUNITY
End: 2017-09-11

## 2017-08-23 RX ORDER — GABAPENTIN 600 MG/1
600 TABLET ORAL 3 TIMES DAILY
COMMUNITY
End: 2017-10-17

## 2017-08-23 RX ORDER — OXYCODONE HYDROCHLORIDE 5 MG/1
5 TABLET ORAL EVERY 6 HOURS PRN
COMMUNITY
End: 2017-09-26 | Stop reason: ALTCHOICE

## 2017-08-23 RX ORDER — SPIRONOLACTONE 25 MG/1
25 TABLET ORAL DAILY
COMMUNITY
End: 2017-12-08 | Stop reason: SDUPTHER

## 2017-08-24 ENCOUNTER — OFFICE VISIT (OUTPATIENT)
Dept: GERIATRIC MEDICINE | Age: 47
End: 2017-08-24

## 2017-08-24 DIAGNOSIS — G06.1 ABSCESS IN EPIDURAL SPACE OF L2-L5 LUMBAR SPINE: ICD-10-CM

## 2017-08-24 DIAGNOSIS — E11.42 DM TYPE 2 WITH DIABETIC PERIPHERAL NEUROPATHY (HCC): Primary | ICD-10-CM

## 2017-08-24 DIAGNOSIS — R50.9 FEVER, UNSPECIFIED FEVER CAUSE: ICD-10-CM

## 2017-08-24 DIAGNOSIS — Z98.890 STATUS POST LUMBAR SPINE SURGERY FOR DECOMPRESSION OF SPINAL CORD: ICD-10-CM

## 2017-08-24 PROCEDURE — 99309 SBSQ NF CARE MODERATE MDM 30: CPT | Performed by: NURSE PRACTITIONER

## 2017-08-25 LAB
ANION GAP SERPL CALCULATED.3IONS-SCNC: 17 MEQ/L (ref 7–13)
BUN BLDV-MCNC: 14 MG/DL (ref 6–20)
CALCIUM SERPL-MCNC: 8.4 MG/DL (ref 8.6–10.2)
CHLORIDE BLD-SCNC: 103 MEQ/L (ref 98–107)
CHOLESTEROL, TOTAL: 147 MG/DL (ref 0–199)
CO2: 19 MEQ/L (ref 22–29)
CREAT SERPL-MCNC: 0.65 MG/DL (ref 0.5–0.9)
CREATININE URINE: 57 MG/DL
GFR AFRICAN AMERICAN: >60
GFR NON-AFRICAN AMERICAN: >60
GLUCOSE BLD-MCNC: 108 MG/DL (ref 74–109)
HBA1C MFR BLD: 5.8 % (ref 4.8–5.9)
HCT VFR BLD CALC: 31.4 % (ref 37–47)
HDLC SERPL-MCNC: 35 MG/DL (ref 40–59)
HEMOGLOBIN: 10 G/DL (ref 12–16)
LDL CHOLESTEROL CALCULATED: 93 MG/DL (ref 0–129)
MCH RBC QN AUTO: 27.3 PG (ref 27–31.3)
MCHC RBC AUTO-ENTMCNC: 31.8 % (ref 33–37)
MCV RBC AUTO: 85.6 FL (ref 82–100)
MICROALBUMIN UR-MCNC: <1.2 MG/DL
MICROALBUMIN/CREAT UR-RTO: NORMAL MG/G (ref 0–30)
PDW BLD-RTO: 15.9 % (ref 11.5–14.5)
PLATELET # BLD: 182 K/UL (ref 130–400)
POTASSIUM SERPL-SCNC: 3.9 MEQ/L (ref 3.5–5.1)
RBC # BLD: 3.67 M/UL (ref 4.2–5.4)
SODIUM BLD-SCNC: 139 MEQ/L (ref 132–144)
TRIGL SERPL-MCNC: 95 MG/DL (ref 0–200)
WBC # BLD: 10.1 K/UL (ref 4.8–10.8)

## 2017-08-28 ENCOUNTER — OFFICE VISIT (OUTPATIENT)
Dept: GERIATRIC MEDICINE | Age: 47
End: 2017-08-28

## 2017-08-28 VITALS
TEMPERATURE: 99.4 F | RESPIRATION RATE: 16 BRPM | HEART RATE: 96 BPM | DIASTOLIC BLOOD PRESSURE: 67 MMHG | OXYGEN SATURATION: 97 % | SYSTOLIC BLOOD PRESSURE: 103 MMHG

## 2017-08-28 DIAGNOSIS — M47.25 OSTEOARTHRITIS OF SPINE WITH RADICULOPATHY, THORACOLUMBAR REGION: ICD-10-CM

## 2017-08-28 DIAGNOSIS — J44.9 CHRONIC OBSTRUCTIVE PULMONARY DISEASE, UNSPECIFIED COPD TYPE (HCC): Primary | ICD-10-CM

## 2017-08-28 LAB — VANCOMYCIN TROUGH: 11.6 UG/ML (ref 5–10)

## 2017-08-28 PROCEDURE — 99308 SBSQ NF CARE LOW MDM 20: CPT | Performed by: NURSE PRACTITIONER

## 2017-08-29 VITALS — DIASTOLIC BLOOD PRESSURE: 64 MMHG | HEART RATE: 82 BPM | SYSTOLIC BLOOD PRESSURE: 138 MMHG | TEMPERATURE: 98.9 F

## 2017-08-29 PROBLEM — R50.9 FEVER: Status: ACTIVE | Noted: 2017-08-24

## 2017-08-29 PROBLEM — Z98.890 STATUS POST LUMBAR SPINE SURGERY FOR DECOMPRESSION OF SPINAL CORD: Status: ACTIVE | Noted: 2017-08-24

## 2017-08-29 PROBLEM — G06.1 ABSCESS IN EPIDURAL SPACE OF L2-L5 LUMBAR SPINE: Status: ACTIVE | Noted: 2017-08-24

## 2017-08-29 LAB
HCT VFR BLD CALC: 32.2 % (ref 36–46)
HCT VFR BLD CALC: 32.2 % (ref 37–47)
HEMOGLOBIN: 10.3 G/DL (ref 12–16)
HEMOGLOBIN: 10.3 G/DL (ref 12–16)

## 2017-08-30 ENCOUNTER — OFFICE VISIT (OUTPATIENT)
Dept: GERIATRIC MEDICINE | Age: 47
End: 2017-08-30

## 2017-08-30 DIAGNOSIS — M54.41 CHRONIC MIDLINE LOW BACK PAIN WITH RIGHT-SIDED SCIATICA: ICD-10-CM

## 2017-08-30 DIAGNOSIS — G89.29 CHRONIC MIDLINE LOW BACK PAIN WITH RIGHT-SIDED SCIATICA: ICD-10-CM

## 2017-08-30 PROCEDURE — 99308 SBSQ NF CARE LOW MDM 20: CPT | Performed by: NURSE PRACTITIONER

## 2017-08-31 LAB
HCT VFR BLD CALC: 32.2 % (ref 37–47)
HEMOGLOBIN: 10 G/DL (ref 12–16)

## 2017-09-03 PROBLEM — M54.9 BACK PAIN: Status: ACTIVE | Noted: 2017-08-30

## 2017-09-05 LAB
ANION GAP SERPL CALCULATED.3IONS-SCNC: 18 MEQ/L (ref 7–13)
BASOPHILS ABSOLUTE: 0 K/UL (ref 0–0.2)
BASOPHILS RELATIVE PERCENT: 0.4 %
BUN BLDV-MCNC: 13 MG/DL (ref 6–20)
CALCIUM SERPL-MCNC: 8.7 MG/DL (ref 8.6–10.2)
CHLORIDE BLD-SCNC: 101 MEQ/L (ref 98–107)
CO2: 24 MEQ/L (ref 22–29)
CREAT SERPL-MCNC: 0.65 MG/DL (ref 0.5–0.9)
EOSINOPHILS ABSOLUTE: 0.2 K/UL (ref 0–0.7)
EOSINOPHILS RELATIVE PERCENT: 2.5 %
GFR AFRICAN AMERICAN: >60
GFR NON-AFRICAN AMERICAN: >60
GLUCOSE BLD-MCNC: 73 MG/DL (ref 74–109)
HCT VFR BLD CALC: 32.8 % (ref 37–47)
HEMOGLOBIN: 10.3 G/DL (ref 12–16)
LYMPHOCYTES ABSOLUTE: 2.2 K/UL (ref 1–4.8)
LYMPHOCYTES RELATIVE PERCENT: 28.5 %
MCH RBC QN AUTO: 26.6 PG (ref 27–31.3)
MCHC RBC AUTO-ENTMCNC: 31.3 % (ref 33–37)
MCV RBC AUTO: 85.1 FL (ref 82–100)
MONOCYTES ABSOLUTE: 0.6 K/UL (ref 0.2–0.8)
MONOCYTES RELATIVE PERCENT: 8.5 %
NEUTROPHILS ABSOLUTE: 4.6 K/UL (ref 1.4–6.5)
NEUTROPHILS RELATIVE PERCENT: 60.1 %
PDW BLD-RTO: 16.3 % (ref 11.5–14.5)
PLATELET # BLD: 142 K/UL (ref 130–400)
POTASSIUM SERPL-SCNC: 3.4 MEQ/L (ref 3.5–5.1)
RBC # BLD: 3.85 M/UL (ref 4.2–5.4)
SODIUM BLD-SCNC: 143 MEQ/L (ref 132–144)
WBC # BLD: 7.6 K/UL (ref 4.8–10.8)

## 2017-09-06 ENCOUNTER — OFFICE VISIT (OUTPATIENT)
Dept: GERIATRIC MEDICINE | Age: 47
End: 2017-09-06

## 2017-09-06 DIAGNOSIS — E87.6 HYPOKALEMIA: ICD-10-CM

## 2017-09-06 PROCEDURE — 99309 SBSQ NF CARE MODERATE MDM 30: CPT | Performed by: NURSE PRACTITIONER

## 2017-09-07 LAB — POTASSIUM SERPL-SCNC: 4 MEQ/L (ref 3.5–5.1)

## 2017-09-10 ENCOUNTER — OFFICE VISIT (OUTPATIENT)
Dept: GERIATRIC MEDICINE | Age: 47
End: 2017-09-10

## 2017-09-10 DIAGNOSIS — M62.830 SPASM OF BACK MUSCLES: ICD-10-CM

## 2017-09-10 PROCEDURE — 99309 SBSQ NF CARE MODERATE MDM 30: CPT | Performed by: NURSE PRACTITIONER

## 2017-09-11 ENCOUNTER — OFFICE VISIT (OUTPATIENT)
Dept: INFECTIOUS DISEASES | Age: 47
End: 2017-09-11

## 2017-09-11 VITALS
SYSTOLIC BLOOD PRESSURE: 106 MMHG | TEMPERATURE: 98.5 F | HEIGHT: 61 IN | RESPIRATION RATE: 18 BRPM | DIASTOLIC BLOOD PRESSURE: 69 MMHG | HEART RATE: 80 BPM

## 2017-09-11 DIAGNOSIS — B18.2 CHRONIC HEPATITIS C WITHOUT HEPATIC COMA (HCC): ICD-10-CM

## 2017-09-11 DIAGNOSIS — G06.1 ABSCESS IN EPIDURAL SPACE OF L2-L5 LUMBAR SPINE: Primary | ICD-10-CM

## 2017-09-11 PROCEDURE — 99213 OFFICE O/P EST LOW 20 MIN: CPT | Performed by: INTERNAL MEDICINE

## 2017-09-11 RX ORDER — METAXALONE 800 MG/1
800 TABLET ORAL 3 TIMES DAILY PRN
COMMUNITY
End: 2018-06-04 | Stop reason: SDUPTHER

## 2017-09-14 PROBLEM — M54.16 RIGHT LUMBAR RADICULOPATHY: Status: ACTIVE | Noted: 2017-09-14

## 2017-09-14 PROBLEM — M48.062 LUMBAR STENOSIS WITH NEUROGENIC CLAUDICATION: Status: ACTIVE | Noted: 2017-09-14

## 2017-09-14 RX ORDER — ZOLPIDEM TARTRATE 10 MG/1
TABLET ORAL
Qty: 20 TABLET | Refills: 3 | Status: SHIPPED | OUTPATIENT
Start: 2017-09-14 | End: 2017-09-26 | Stop reason: ALTCHOICE

## 2017-09-14 ASSESSMENT — ENCOUNTER SYMPTOMS
GASTROINTESTINAL NEGATIVE: 1
COLOR CHANGE: 0
RESPIRATORY NEGATIVE: 1

## 2017-09-15 VITALS
RESPIRATION RATE: 16 BRPM | TEMPERATURE: 98.5 F | HEART RATE: 89 BPM | DIASTOLIC BLOOD PRESSURE: 66 MMHG | SYSTOLIC BLOOD PRESSURE: 103 MMHG | OXYGEN SATURATION: 96 %

## 2017-09-15 PROBLEM — E87.6 HYPOKALEMIA: Status: ACTIVE | Noted: 2017-09-06

## 2017-09-19 PROBLEM — M62.830 SPASM OF BACK MUSCLES: Status: ACTIVE | Noted: 2017-09-10

## 2017-09-26 ENCOUNTER — OFFICE VISIT (OUTPATIENT)
Dept: FAMILY MEDICINE CLINIC | Age: 47
End: 2017-09-26

## 2017-09-26 VITALS
BODY MASS INDEX: 45.2 KG/M2 | RESPIRATION RATE: 18 BRPM | WEIGHT: 239.4 LBS | SYSTOLIC BLOOD PRESSURE: 118 MMHG | TEMPERATURE: 97.2 F | HEART RATE: 72 BPM | HEIGHT: 61 IN | DIASTOLIC BLOOD PRESSURE: 82 MMHG

## 2017-09-26 DIAGNOSIS — M48.061 LUMBAR CANAL STENOSIS: ICD-10-CM

## 2017-09-26 DIAGNOSIS — E11.42 TYPE 2 DIABETES MELLITUS WITH DIABETIC POLYNEUROPATHY, WITH LONG-TERM CURRENT USE OF INSULIN (HCC): Primary | ICD-10-CM

## 2017-09-26 DIAGNOSIS — Z79.4 TYPE 2 DIABETES MELLITUS WITH DIABETIC POLYNEUROPATHY, WITH LONG-TERM CURRENT USE OF INSULIN (HCC): Primary | ICD-10-CM

## 2017-09-26 DIAGNOSIS — I10 ESSENTIAL HYPERTENSION: ICD-10-CM

## 2017-09-26 DIAGNOSIS — K21.9 GASTROESOPHAGEAL REFLUX DISEASE WITHOUT ESOPHAGITIS: ICD-10-CM

## 2017-09-26 DIAGNOSIS — K58.1 IRRITABLE BOWEL SYNDROME WITH CONSTIPATION: ICD-10-CM

## 2017-09-26 DIAGNOSIS — M21.371 RIGHT FOOT DROP: ICD-10-CM

## 2017-09-26 DIAGNOSIS — E11.42 DM TYPE 2 WITH DIABETIC PERIPHERAL NEUROPATHY (HCC): ICD-10-CM

## 2017-09-26 DIAGNOSIS — E78.2 MIXED HYPERLIPIDEMIA: ICD-10-CM

## 2017-09-26 DIAGNOSIS — Z09 HOSPITAL DISCHARGE FOLLOW-UP: ICD-10-CM

## 2017-09-26 PROCEDURE — 99214 OFFICE O/P EST MOD 30 MIN: CPT | Performed by: FAMILY MEDICINE

## 2017-09-26 RX ORDER — OXYCODONE HYDROCHLORIDE AND ACETAMINOPHEN 5; 325 MG/1; MG/1
1 TABLET ORAL 2 TIMES DAILY PRN
Qty: 60 TABLET | Refills: 0 | Status: CANCELLED | OUTPATIENT
Start: 2017-10-18 | End: 2017-11-17

## 2017-09-26 RX ORDER — TIZANIDINE 2 MG/1
2 TABLET ORAL DAILY
Qty: 30 TABLET | Refills: 0 | Status: CANCELLED | OUTPATIENT
Start: 2017-09-26 | End: 2017-10-26

## 2017-09-26 RX ORDER — GLIPIZIDE 5 MG/1
TABLET ORAL
Qty: 180 TABLET | Refills: 1 | Status: SHIPPED | OUTPATIENT
Start: 2017-09-26 | End: 2017-10-24 | Stop reason: DRUGHIGH

## 2017-09-27 PROBLEM — M21.371 RIGHT FOOT DROP: Status: ACTIVE | Noted: 2017-09-27

## 2017-09-27 ASSESSMENT — PATIENT HEALTH QUESTIONNAIRE - PHQ9
2. FEELING DOWN, DEPRESSED OR HOPELESS: 0
SUM OF ALL RESPONSES TO PHQ QUESTIONS 1-9: 0
1. LITTLE INTEREST OR PLEASURE IN DOING THINGS: 0
SUM OF ALL RESPONSES TO PHQ9 QUESTIONS 1 & 2: 0

## 2017-10-21 LAB
ALBUMIN SERPL-MCNC: 3.8 G/DL
ALP BLD-CCNC: 57 U/L
ALT SERPL-CCNC: 11 U/L
ANION GAP SERPL CALCULATED.3IONS-SCNC: 12 MMOL/L
AST SERPL-CCNC: 13 U/L
AVERAGE GLUCOSE: NORMAL
BILIRUB SERPL-MCNC: 0.3 MG/DL (ref 0.1–1.4)
BUN BLDV-MCNC: NORMAL MG/DL
CALCIUM SERPL-MCNC: 8.8 MG/DL
CHLORIDE BLD-SCNC: 111 MMOL/L
CHOLESTEROL, TOTAL: 149 MG/DL
CHOLESTEROL/HDL RATIO: 3.7
CO2: NORMAL MMOL/L
CREAT SERPL-MCNC: 0.81 MG/DL
GFR CALCULATED: >60
GLUCOSE BLD-MCNC: 114 MG/DL
HBA1C MFR BLD: 5.4 %
HDLC SERPL-MCNC: 40 MG/DL (ref 35–70)
LDL CHOLESTEROL CALCULATED: 90 MG/DL (ref 0–160)
POTASSIUM SERPL-SCNC: 3.7 MMOL/L
SODIUM BLD-SCNC: 141 MMOL/L
TOTAL PROTEIN: 7.6
TRIGL SERPL-MCNC: 94 MG/DL
VLDLC SERPL CALC-MCNC: 19 MG/DL

## 2017-10-23 DIAGNOSIS — E11.42 DM TYPE 2 WITH DIABETIC PERIPHERAL NEUROPATHY (HCC): ICD-10-CM

## 2017-10-23 DIAGNOSIS — I10 ESSENTIAL HYPERTENSION: ICD-10-CM

## 2017-10-23 DIAGNOSIS — E78.2 MIXED HYPERLIPIDEMIA: ICD-10-CM

## 2017-10-24 ENCOUNTER — OFFICE VISIT (OUTPATIENT)
Dept: FAMILY MEDICINE CLINIC | Age: 47
End: 2017-10-24

## 2017-10-24 VITALS
BODY MASS INDEX: 45.31 KG/M2 | DIASTOLIC BLOOD PRESSURE: 84 MMHG | TEMPERATURE: 96.4 F | WEIGHT: 240 LBS | HEIGHT: 61 IN | HEART RATE: 90 BPM | SYSTOLIC BLOOD PRESSURE: 126 MMHG | RESPIRATION RATE: 24 BRPM

## 2017-10-24 DIAGNOSIS — E66.01 MORBID OBESITY DUE TO EXCESS CALORIES (HCC): ICD-10-CM

## 2017-10-24 DIAGNOSIS — E78.2 MIXED HYPERLIPIDEMIA: ICD-10-CM

## 2017-10-24 DIAGNOSIS — E53.8 VITAMIN B12 DEFICIENCY: ICD-10-CM

## 2017-10-24 DIAGNOSIS — Z23 NEEDS FLU SHOT: ICD-10-CM

## 2017-10-24 DIAGNOSIS — I10 ESSENTIAL HYPERTENSION: ICD-10-CM

## 2017-10-24 DIAGNOSIS — E11.42 DM TYPE 2 WITH DIABETIC PERIPHERAL NEUROPATHY (HCC): Primary | ICD-10-CM

## 2017-10-24 DIAGNOSIS — E55.9 VITAMIN D DEFICIENCY: ICD-10-CM

## 2017-10-24 DIAGNOSIS — Z23 NEED FOR TDAP VACCINATION: ICD-10-CM

## 2017-10-24 PROCEDURE — 90471 IMMUNIZATION ADMIN: CPT | Performed by: FAMILY MEDICINE

## 2017-10-24 PROCEDURE — 90688 IIV4 VACCINE SPLT 0.5 ML IM: CPT | Performed by: FAMILY MEDICINE

## 2017-10-24 PROCEDURE — 1036F TOBACCO NON-USER: CPT | Performed by: FAMILY MEDICINE

## 2017-10-24 PROCEDURE — 90715 TDAP VACCINE 7 YRS/> IM: CPT | Performed by: FAMILY MEDICINE

## 2017-10-24 PROCEDURE — G8484 FLU IMMUNIZE NO ADMIN: HCPCS | Performed by: FAMILY MEDICINE

## 2017-10-24 PROCEDURE — G8427 DOCREV CUR MEDS BY ELIG CLIN: HCPCS | Performed by: FAMILY MEDICINE

## 2017-10-24 PROCEDURE — G8417 CALC BMI ABV UP PARAM F/U: HCPCS | Performed by: FAMILY MEDICINE

## 2017-10-24 PROCEDURE — 99214 OFFICE O/P EST MOD 30 MIN: CPT | Performed by: FAMILY MEDICINE

## 2017-10-24 PROCEDURE — 3044F HG A1C LEVEL LT 7.0%: CPT | Performed by: FAMILY MEDICINE

## 2017-10-24 PROCEDURE — 90472 IMMUNIZATION ADMIN EACH ADD: CPT | Performed by: FAMILY MEDICINE

## 2017-10-24 RX ORDER — OMEPRAZOLE 20 MG/1
40 CAPSULE, DELAYED RELEASE ORAL DAILY
Qty: 30 CAPSULE | Refills: 2 | Status: SHIPPED | OUTPATIENT
Start: 2017-10-24 | End: 2017-11-21 | Stop reason: DRUGHIGH

## 2017-10-24 RX ORDER — GLIPIZIDE 5 MG/1
TABLET ORAL
Qty: 180 TABLET | Refills: 1 | Status: SHIPPED
Start: 2017-10-24 | End: 2017-10-24 | Stop reason: DRUGHIGH

## 2017-10-24 RX ORDER — GLIPIZIDE 5 MG/1
5 TABLET, FILM COATED, EXTENDED RELEASE ORAL DAILY
Qty: 30 TABLET | Refills: 3 | Status: SHIPPED | OUTPATIENT
Start: 2017-10-24 | End: 2018-04-26 | Stop reason: SDUPTHER

## 2017-10-24 RX ORDER — MELOXICAM 15 MG/1
15 TABLET ORAL DAILY
Qty: 30 TABLET | Refills: 3 | Status: SHIPPED | OUTPATIENT
Start: 2017-10-24 | End: 2018-01-30 | Stop reason: SDUPTHER

## 2017-10-24 NOTE — PROGRESS NOTES
peripheral neuropathy ([address] 4/16/2016    DM type 2 with diabetic peripheral neuropathy ([address] 4/16/2016    Epidural lipomatosis     GERD (gastroesophageal reflux disease)     Gout     Headache 4/20/2015    Headache 4/20/2015    Hepatitis C     History of blood transfusion 2016?    no reaction     History of peptic ulcer disease     History of seizures     History of TIA (transient ischemic attack)     Hyperlipidemia     Hypertension     Lumbar stenosis     Obesity     Psoriasis     SOB (shortness of breath) 5/22/2015    Type II or unspecified type diabetes mellitus without mention of complication, not stated as uncontrolled      Patient Active Problem List    Diagnosis Date Noted    Right foot drop 09/27/2017    Right lumbar radiculopathy 09/14/2017    Lumbar stenosis with neurogenic claudication 09/14/2017    Spasm of back muscles 09/10/2017    Hypokalemia 09/06/2017    Back pain 08/30/2017    Status post lumbar spine surgery for decompression of spinal cord 08/24/2017    Abscess in epidural space of L2-L5 lumbar spine 08/24/2017    Fever 08/24/2017    Epidural lipomatosis 06/01/2017    Arthritis of right knee 05/02/2017    Lumbar canal stenosis 04/07/2017    Lumbar radiculopathy 02/14/2017    Transitional vertebrae 02/09/2017    Gastroesophageal reflux disease without esophagitis 02/02/2017    Osteoarthritis of spine with radiculopathy, thoracolumbar region 02/02/2017    Morbid obesity due to excess calories ([address] 02/02/2017    Arthralgia of left knee 02/02/2017    Vitamin D deficiency 01/23/2017    DM type 2 with diabetic peripheral neuropathy ([address] 04/16/2016    Mixed hyperlipidemia 04/16/2016    Essential hypertension 04/16/2016    Nonintractable episodic headache 04/04/2016    Intractable cluster headache syndrome 03/14/2016    Seizure disorder ([address] 12/31/2014    Gout     Chronic hepatitis C without hepatic coma ([address] 12/12/2013    COPD (chronic obstructive use No    Drug use: No    Sexual activity: Not Asked     Other Topics Concern    None     Social History Narrative    None     Current Outpatient Prescriptions   Medication Sig Dispense Refill    meloxicam (MOBIC) 15 MG tablet Take 1 tablet by mouth daily 30 tablet 3    omeprazole (PRILOSEC) 20 MG delayed release capsule Take 2 capsules by mouth daily 30 capsule 2    linaclotide (LINZESS) 290 MCG CAPS capsule Take 1 capsule by mouth every morning (before breakfast) 30 capsule 2    insulin detemir (LEVEMIR FLEXTOUCH) 100 UNIT/ML injection pen Inject 40 Units into the skin nightly 15 mL 5    glipiZIDE (GLUCOTROL XL) 5 MG extended release tablet Take 1 tablet by mouth daily 30 tablet 3    Misc. Devices ([address]) MISC 1 each by Does not apply route once for 1 dose 1 each 0    oxyCODONE-acetaminophen (PERCOCET) 5-325 MG per tablet Take 1 tablet by mouth 2 times daily as needed for Pain . Earliest Fill Date: 10/21/17 60 tablet 0    tiZANidine (ZANAFLEX) 2 MG tablet Take 1 tablet by mouth daily 30 tablet 0    gabapentin (NEURONTIN) 400 MG capsule Take 1 capsule by mouth 3 times daily 90 capsule 0    metaxalone (SKELAXIN) 800 MG tablet Take 800 mg by mouth 3 times daily as needed for Pain      metFORMIN (GLUCOPHAGE) 1000 MG tablet Take 1,000 mg by mouth 2 times daily (with meals)      insulin lispro (HUMALOG) 100 UNIT/ML injection vial Inject 10 Units into the skin 3 times daily (before meals)      lidocaine (LIDODERM) 5 % Place 1 patch onto the skin daily 12 hours on, 12 hours off.       albuterol sulfate HFA (VENTOLIN HFA) 108 (90 Base) MCG/ACT inhaler Inhale 2 puffs into the lungs every 6 hours as needed for Wheezing      atorvastatin (LIPITOR) 40 MG tablet Take 40 mg by mouth daily      topiramate (TOPAMAX) 200 MG tablet Take 200 mg by mouth 2 times daily      carvedilol (COREG) 12.5 MG tablet Take 12.5 mg by mouth 2 times daily      vitamin D (ERGOCALCIFEROL) 81840 units CAPS capsule Take 50,000 Units by mouth once a week      spironolactone (ALDACTONE) 25 MG tablet Take 25 mg by mouth daily      ferrous sulfate 325 (65 Fe) MG tablet Take 325 mg by mouth daily (with breakfast)      potassium chloride (MICRO-K) 10 MEQ extended release capsule Take 20 mEq by mouth 2 times daily      furosemide (LASIX) 40 MG tablet Take 40 mg by mouth daily      fluticasone-salmeterol (ADVAIR HFA) 45-21 MCG/ACT inhaler Inhale 2 puffs into the lungs 2 times daily      acetaminophen (TYLENOL) 325 MG tablet Take 650 mg by mouth every 4 hours as needed for Pain      zolpidem (AMBIEN) 10 MG tablet Take 10 mg by mouth nightly as needed for Sleep       No current facility-administered medications for this visit.       Allergies   Allergen Reactions    Heparin Shortness Of Breath    Pcn [Penicillins] Shortness Of Breath       Review of Systems  Constitutional: negative for anorexia, fatigue, fevers, sweats and weight loss  Eyes: negative for color blindness, irritation, redness and visual disturbance  Ears, nose, mouth, throat, and face: negative for ear drainage, hearing loss, nasal congestion, sore mouth, tinnitus and voice change  Respiratory: negative for cough, dyspnea on exertion, shortness of breath and wheezing  Cardiovascular: negative for chest pain, dyspnea, lower extremity edema, orthopnea, palpitations, paroxysmal nocturnal dyspnea and tachypnea  Gastrointestinal: negative for abdominal pain, constipation, diarrhea, dyspepsia, dysphagia, nausea, odynophagia, reflux symptoms and vomiting  Genitourinary:negative for decreased stream, dysuria, frequency, hematuria, hesitancy and urinary incontinence  Integument/breast: negative for dryness, pruritus, rash and skin color change  Hematologic/lymphatic: negative for bleeding, easy bruising and petechiae  Musculoskeletal ROS: positive for - joint pain, joint stiffness and pain in lower back    Neurological: negative for coordination problems, dizziness, headaches, paresthesia, speech problems, tremors and vertigo       Objective:      /84   Pulse 90   Temp 96.4 °F (35.8 °C) (Temporal)   Resp 24   Ht 5' 1\" (1.549 m)   Wt 240 lb (108.9 kg)   BMI 45.35 kg/m²       Well appearing, well nourished patient not in apparent distress. HEENT:   EOMI, PERRLA. No conjunctival pallor or scleral jaundice. Oropharynx reveals no erythema or exudate. TMs normal bilaterally. LYMPHATICS:   no lymphadenopathy. SKIN:  pink, warm and dry with no rash. NECK:  supple, trachea central, no thyromegaly. No JVD Or carotid bruit. CHEST WALL:  no deformity. No chest wall tenderness. LUNGS:  has normal chest wall excursion. Chest wall is symmetrical.   Normal vocal fremitus. Normal tactile fremitus. Has good air entry bilaterally with normal vesicular breath sounds. No rhonchi, rales or wheezes. HEART:   point of maximum impulse is at the 5th left intercostal space, mid clavicular line. No palpable thrill. First and second heart sounds are normal.   No murmur, gallop or rub. ABDOMEN:  non-distended, soft, moves with respiration. No area of tenderness. No guarding and no rebound tenderness. No CVA tenderness. No palpable intraabdominal mass. Bowel sounds normal.     EXTREMITIES:   no cc or e. NEURO: alert and oriented x3. Cranial nerves II-XII normal with no focal deficit. Has normal gait. MUSCULOSKELETAL:   no joint swelling or deformity noted. Both feet are warm to touch. Dorsalis pedis and posterior tibia pulses are palpable. No ulcers, sores or gangrene. No evidence of critical leg ischemia. Sensation normal in eight point monofilament testing. No deformity or calluses.  .    Lab Review  Orders Only on 10/23/2017   Component Date Value Ref Range Status    Hemoglobin A1C 10/21/2017 5.4  % Final    Sodium 10/21/2017 141  mmol/L Final    Chloride 10/21/2017 111  mmol/L Final    Potassium 10/21/2017 3.7  mmol/L Final  CREATININE 10/21/2017 0.81   Final    Glucose 10/21/2017 114  mg/dL Final    AST 10/21/2017 13  U/L Final    ALT 10/21/2017 11  U/L Final    Calcium 10/21/2017 8.8  mg/dL Final    Total Protein 10/21/2017 7.6   Final    Alb 10/21/2017 3.8   Final    Alkaline Phosphatase 10/21/2017 57  U/L Final    Total Bilirubin 10/21/2017 0.3  0.1 - 1.4 mg/dL Final    Gfr Calculated 10/21/2017 >60   Final    Anion Gap 10/21/2017 12  mmol/L Final    Cholesterol, Total 10/21/2017 149  mg/dL Final    HDL 10/21/2017 40  35 - 70 mg/dL Final    LDL Calculated 10/21/2017 90  0 - 160 mg/dL Final    Triglycerides 10/21/2017 94  mg/dL Final    Chol/HDL Ratio 10/21/2017 3.7   Final    VLDL 10/21/2017 19  mg/dL Final        Assessment:     Encounter Diagnoses   Name Primary?  DM type 2 with diabetic peripheral neuropathy (HCC) Yes    Mixed hyperlipidemia     Essential hypertension     Need for Tdap vaccination     Needs flu shot     Vitamin B12 deficiency     Vitamin D deficiency     Morbid obesity due to excess calories [facility])        Plan:      Outpatient Encounter Prescriptions as of 10/24/2017   Medication Sig Dispense Refill    meloxicam (MOBIC) 15 MG tablet Take 1 tablet by mouth daily 30 tablet 3    omeprazole (PRILOSEC) 20 MG delayed release capsule Take 2 capsules by mouth daily 30 capsule 2    linaclotide (LINZESS) 290 MCG CAPS capsule Take 1 capsule by mouth every morning (before breakfast) 30 capsule 2    insulin detemir (LEVEMIR FLEXTOUCH) 100 UNIT/ML injection pen Inject 40 Units into the skin nightly 15 mL 5    glipiZIDE (GLUCOTROL XL) 5 MG extended release tablet Take 1 tablet by mouth daily 30 tablet 3    Misc. Devices ([address]) MISC 1 each by Does not apply route once for 1 dose 1 each 0    oxyCODONE-acetaminophen (PERCOCET) 5-325 MG per tablet Take 1 tablet by mouth 2 times daily as needed for Pain .  Earliest Fill Date: 10/21/17 60 tablet 0    tiZANidine (ZANAFLEX) 2 MG tablet Take

## 2017-10-24 NOTE — PATIENT INSTRUCTIONS
Patient Education        Type 2 Diabetes: Care Instructions  Your Care Instructions  Type 2 diabetes is a disease that develops when the body's tissues cannot use insulin properly. Over time, the pancreas cannot make enough insulin. Insulin is a hormone that helps the body's cells use sugar (glucose) for energy. It also helps the body store extra sugar in muscle, fat, and liver cells. Without insulin, the sugar cannot get into the cells to do its work. It stays in the blood instead. This can cause high blood sugar levels. A person has diabetes when the blood sugar stays too high too much of the time. Over time, diabetes can lead to diseases of the heart, blood vessels, nerves, kidneys, and eyes. You may be able to control your blood sugar by losing weight, eating a healthy diet, and getting daily exercise. You may also have to take insulin or other diabetes medicine. Follow-up care is a key part of your treatment and safety. Be sure to make and go to all appointments. Call your doctor if you are having problems. It's also a good idea to know your test results and keep a list of the medicines you take. How can you care for yourself at home? · Keep your blood sugar at a target level (which you set with your doctor). ¨ Eat a good diet that spreads carbohydrate throughout the day. Carbohydratethe body's main source of fuelaffects blood sugar more than any other nutrient. Carbohydrate is in fruits, vegetables, milk, and yogurt. It also is in breads, cereals, vegetables such as potatoes and corn, and sugary foods such as candy and cakes. ¨ Aim for 30 minutes of exercise on most, preferably all, days of the week. Walking is a good choice. You also may want to do other activities, such as running, swimming, cycling, or playing tennis or team sports. If your doctor says it's okay, do muscle-strengthening exercises at least 2 times a week. ¨ Take your medicines exactly as prescribed.  Call your doctor if you think you are having a problem with your medicine. You will get more details on the specific medicines your doctor prescribes. · Check your blood sugar as often as your doctor recommends. It is important to keep track of any symptoms you have, such as low blood sugar. Also tell your doctor if you have any changes in your activities, diet, or insulin use. · Talk to your doctor before you start taking aspirin every day. Aspirin can help certain people lower their risk of a heart attack or stroke. But taking aspirin isn't right for everyone, because it can cause serious bleeding. · Do not smoke. If you need help quitting, talk to your doctor about stop-smoking programs and medicines. These can increase your chances of quitting for good. · Keep your cholesterol and blood pressure at normal levels. You may need to take one or more medicines to reach your goals. Take them exactly as directed. Do not stop or change a medicine without talking to your doctor first.  When should you call for help? Call 911 anytime you think you may need emergency care. For example, call if:  · You passed out (lost consciousness), or you suddenly become very sleepy or confused. (You may have very low blood sugar.)  Call your doctor now or seek immediate medical care if:  · Your blood sugar is 300 mg/dL or is higher than the level your doctor has set for you. · You have symptoms of low blood sugar, such as:  ¨ Sweating. ¨ Feeling nervous, shaky, and weak. ¨ Extreme hunger and slight nausea. ¨ Dizziness and headache. ¨ Blurred vision. ¨ Confusion. Watch closely for changes in your health, and be sure to contact your doctor if:  · You often have problems controlling your blood sugar. · You have symptoms of long-term diabetes problems, such as:  ¨ New vision changes. ¨ New pain, numbness, or tingling in your hands or feet. ¨ Skin problems. Where can you learn more? Go to [email or web address] and sign in to your [employer] account. Enter C553 in the [facility] box to learn more about \"Type 2 Diabetes: Care Instructions. \"     If you do not have an account, please click on the \"Sign Up Now\" link. Current as of: 2017  Content Version: 11.3  © 5473-4807 [employer]. Care instructions adapted under license by [facility] [place] [facility] [facility]). If you have questions about a medical condition or this instruction, always ask your healthcare professional. [address] any warranty or liability for your use of this information. Patient Education        Tdap (Tetanus, Diphtheria, Pertussis) Vaccine: What You Need to Know  Why get vaccinated? Tetanus, diphtheria, and pertussis are very serious diseases. Tdap vaccine can protect us from these diseases. And Tdap vaccine given to pregnant women can protect  babies against pertussis. Tetanus (lockjaw) is rare in the [facility] today. It causes painful muscle tightening and stiffness, usually all over the body. · It can lead to tightening of muscles in the head and neck so you can't open your mouth, swallow, or sometimes even breathe. Tetanus kills about 1 out of 10 people who are infected even after receiving the best medical care. Diphtheria is also rare in the [place] today. It can cause a thick coating to form in the back of the throat. · It can lead to breathing problems, heart failure, paralysis, and death. Pertussis (whooping cough) causes severe coughing spells, which can cause difficulty breathing, vomiting, and disturbed sleep. · It can also lead to weight loss, incontinence, and rib fractures. Up to 2 in 100 adolescents and 5 in 100 adults with pertussis are hospitalized or have complications, which could include pneumonia or death. These diseases are caused by bacteria. Diphtheria and pertussis are spread from person to person through secretions from coughing or sneezing.  Tetanus enters the body through cuts, can help prevent fainting, and injuries caused by a fall. Tell your doctor if you feel dizzy or have vision changes or ringing in the ears. · Some people get severe pain in the shoulder and have difficulty moving the arm where a shot was given. This happens very rarely. · Any medication can cause a severe allergic reaction. Such reactions from a vaccine are very rare, estimated at fewer than 1 in a million doses, and would happen within a few minutes to a few hours after the vaccination. As with any medicine, there is a very remote chance of a vaccine causing a serious injury or death. The safety of vaccines is always being monitored. For more information, visit: www.cdc.gov/vaccinesafety. What if there is a serious problem? What should I look for? · Look for anything that concerns you, such as signs of a severe allergic reaction, very high fever, or unusual behavior. Signs of a severe allergic reaction can include hives, swelling of the face and throat, difficulty breathing, a fast heartbeat, dizziness, and weakness. These would usually start a few minutes to a few hours after the vaccination. What should I do? · If you think it is a severe allergic reaction or other emergency that can't wait, call 9-1-1 or get the person to the nearest hospital. Otherwise, call your doctor. · Afterward, the reaction should be reported to the Vaccine Adverse Event Reporting System (VAERS). Your doctor might file this report, or you can do it yourself through the VAERS web site at www.vaers. hhs.gov, or by calling 2-877.962.9250. VAERS does not give medical advice. The National Vaccine Injury Compensation Program  The National Vaccine Injury Compensation Program (VICP) is a federal program that was created to compensate people who may have been injured by certain vaccines.   Persons who believe they may have been injured by a vaccine can learn about the program and about filing a claim by calling 8-639.772.2346 or

## 2017-10-24 NOTE — PROGRESS NOTES
Vaccine Information Sheet, \"Influenza - Inactivated\" OR \"Live - Intranasal\"  given to [facility]. Patient responses:    Have you ever had a reaction to a flu vaccine? No  Are you able to eat eggs without adverse effects? Yes  Do you have any current illness? No  Have you ever had Guillian [place] Syndrome? No    Flu vaccine given per order. Please see immunization tab.

## 2017-10-25 RX ORDER — OMEPRAZOLE 20 MG/1
40 CAPSULE, DELAYED RELEASE ORAL DAILY
Qty: 180 CAPSULE | Refills: 3 | OUTPATIENT
Start: 2017-10-25

## 2017-10-25 RX ORDER — GLIPIZIDE 5 MG/1
5 TABLET, FILM COATED, EXTENDED RELEASE ORAL DAILY
Qty: 90 TABLET | Refills: 3 | OUTPATIENT
Start: 2017-10-25

## 2017-10-26 ENCOUNTER — TELEPHONE (OUTPATIENT)
Dept: FAMILY MEDICINE CLINIC | Age: 47
End: 2017-10-26

## 2017-10-30 ENCOUNTER — TELEPHONE (OUTPATIENT)
Dept: FAMILY MEDICINE CLINIC | Age: 47
End: 2017-10-30

## 2017-10-30 NOTE — TELEPHONE ENCOUNTER
Advise to take MAGNESIUM citrate 1 bottle OTC as needed in addition to the [place].   She will require GI referral if no improvement

## 2017-11-11 RX ORDER — ERGOCALCIFEROL 1.25 MG/1
50000 CAPSULE ORAL WEEKLY
Qty: 4 CAPSULE | Refills: 5 | Status: SHIPPED | OUTPATIENT
Start: 2017-11-11 | End: 2018-07-30 | Stop reason: SDUPTHER

## 2017-11-21 RX ORDER — OMEPRAZOLE 20 MG/1
40 CAPSULE, DELAYED RELEASE ORAL DAILY
Qty: 90 CAPSULE | Refills: 1 | Status: CANCELLED | OUTPATIENT
Start: 2017-11-21

## 2017-11-21 RX ORDER — OMEPRAZOLE 40 MG/1
40 CAPSULE, DELAYED RELEASE ORAL DAILY
Qty: 30 CAPSULE | Refills: 2 | Status: SHIPPED | OUTPATIENT
Start: 2017-11-21 | End: 2018-01-30 | Stop reason: SDUPTHER

## 2017-11-25 ENCOUNTER — TELEPHONE (OUTPATIENT)
Dept: FAMILY MEDICINE CLINIC | Age: 47
End: 2017-11-25

## 2017-11-25 DIAGNOSIS — M54.16 LUMBAR RADICULOPATHY: ICD-10-CM

## 2017-11-25 DIAGNOSIS — M48.062 SPINAL STENOSIS OF LUMBAR REGION WITH NEUROGENIC CLAUDICATION: ICD-10-CM

## 2017-11-25 DIAGNOSIS — M47.25 OSTEOARTHRITIS OF SPINE WITH RADICULOPATHY, THORACOLUMBAR REGION: Primary | ICD-10-CM

## 2017-11-25 DIAGNOSIS — R27.0 ATAXIA: ICD-10-CM

## 2017-11-25 NOTE — TELEPHONE ENCOUNTER
PATIENT CALLS REQUESTING A NEW RX BE SENT TO [facility] FA[facility]# [phone]. THE RX NEEDS TO BE WRITTEN FOR ROLATOR WITH SEAT AND HAND BRAKES. PLEASE ADVISE.   PT# [phone] (home)

## 2017-11-29 RX ORDER — FUROSEMIDE 40 MG/1
40 TABLET ORAL DAILY
Qty: 90 TABLET | Refills: 1 | Status: SHIPPED | OUTPATIENT
Start: 2017-11-29 | End: 2018-07-19 | Stop reason: SDUPTHER

## 2017-12-05 RX ORDER — ZOLPIDEM TARTRATE 10 MG/1
10 TABLET ORAL NIGHTLY PRN
OUTPATIENT
Start: 2017-12-05

## 2017-12-07 PROBLEM — M48.062 PSEUDOCLAUDICATION SYNDROME: Status: ACTIVE | Noted: 2017-12-07

## 2017-12-08 RX ORDER — SPIRONOLACTONE 25 MG/1
25 TABLET ORAL DAILY
Qty: 30 TABLET | Refills: 8 | Status: SHIPPED | OUTPATIENT
Start: 2017-12-08 | End: 2018-10-22 | Stop reason: ALTCHOICE

## 2018-01-07 RX ORDER — LINACLOTIDE 290 UG/1
CAPSULE, GELATIN COATED ORAL
Qty: 30 CAPSULE | Refills: 5 | Status: SHIPPED | OUTPATIENT
Start: 2018-01-07 | End: 2018-09-20 | Stop reason: SDUPTHER

## 2018-01-08 ENCOUNTER — TELEPHONE (OUTPATIENT)
Dept: FAMILY MEDICINE CLINIC | Age: 48
End: 2018-01-08

## 2018-01-12 PROBLEM — Z98.890 HISTORY OF LUMBOSACRAL SPINE SURGERY: Status: ACTIVE | Noted: 2018-01-12

## 2018-01-26 LAB
ALBUMIN SERPL-MCNC: 4.3 G/DL
ALP BLD-CCNC: 65 U/L
ALT SERPL-CCNC: 13 U/L
ANION GAP SERPL CALCULATED.3IONS-SCNC: 12 MMOL/L
AST SERPL-CCNC: 15 U/L
AVERAGE GLUCOSE: NORMAL
BILIRUB SERPL-MCNC: 0.3 MG/DL (ref 0.1–1.4)
BUN BLDV-MCNC: NORMAL MG/DL
CALCIUM SERPL-MCNC: 9.3 MG/DL
CHLORIDE BLD-SCNC: 110 MMOL/L
CHOLESTEROL, TOTAL: 166 MG/DL
CHOLESTEROL/HDL RATIO: 3.5
CO2: NORMAL MMOL/L
CREAT SERPL-MCNC: 0.83 MG/DL
FOLATE: 7.4
GFR CALCULATED: >60
GLUCOSE BLD-MCNC: 114 MG/DL
HBA1C MFR BLD: 6 %
HDLC SERPL-MCNC: 48 MG/DL (ref 35–70)
LDL CHOLESTEROL CALCULATED: 100 MG/DL (ref 0–160)
POTASSIUM SERPL-SCNC: 3.9 MMOL/L
SODIUM BLD-SCNC: 141 MMOL/L
TOTAL PROTEIN: 8.3
TRIGL SERPL-MCNC: 89 MG/DL
VITAMIN B-12: 262
VITAMIN D 25-HYDROXY: 35
VITAMIN D2, 25 HYDROXY: NORMAL
VITAMIN D3,25 HYDROXY: NORMAL
VLDLC SERPL CALC-MCNC: 18 MG/DL

## 2018-01-29 DIAGNOSIS — E53.8 VITAMIN B12 DEFICIENCY: ICD-10-CM

## 2018-01-29 DIAGNOSIS — E55.9 VITAMIN D DEFICIENCY: ICD-10-CM

## 2018-01-30 ENCOUNTER — OFFICE VISIT (OUTPATIENT)
Dept: FAMILY MEDICINE CLINIC | Age: 48
End: 2018-01-30
Payer: COMMERCIAL

## 2018-01-30 VITALS
RESPIRATION RATE: 18 BRPM | TEMPERATURE: 97.4 F | DIASTOLIC BLOOD PRESSURE: 70 MMHG | HEIGHT: 61 IN | BODY MASS INDEX: 46.29 KG/M2 | WEIGHT: 245.2 LBS | SYSTOLIC BLOOD PRESSURE: 124 MMHG | HEART RATE: 72 BPM

## 2018-01-30 DIAGNOSIS — F51.04 CHRONIC INSOMNIA: ICD-10-CM

## 2018-01-30 DIAGNOSIS — B18.2 CHRONIC HEPATITIS C WITHOUT HEPATIC COMA (HCC): ICD-10-CM

## 2018-01-30 DIAGNOSIS — Z12.39 BREAST CANCER SCREENING: ICD-10-CM

## 2018-01-30 DIAGNOSIS — E78.2 MIXED HYPERLIPIDEMIA: ICD-10-CM

## 2018-01-30 DIAGNOSIS — E11.42 DM TYPE 2 WITH DIABETIC PERIPHERAL NEUROPATHY (HCC): Primary | ICD-10-CM

## 2018-01-30 DIAGNOSIS — I10 ESSENTIAL HYPERTENSION: ICD-10-CM

## 2018-01-30 PROCEDURE — 3044F HG A1C LEVEL LT 7.0%: CPT | Performed by: FAMILY MEDICINE

## 2018-01-30 PROCEDURE — 1036F TOBACCO NON-USER: CPT | Performed by: FAMILY MEDICINE

## 2018-01-30 PROCEDURE — 99214 OFFICE O/P EST MOD 30 MIN: CPT | Performed by: FAMILY MEDICINE

## 2018-01-30 PROCEDURE — G8427 DOCREV CUR MEDS BY ELIG CLIN: HCPCS | Performed by: FAMILY MEDICINE

## 2018-01-30 PROCEDURE — G8484 FLU IMMUNIZE NO ADMIN: HCPCS | Performed by: FAMILY MEDICINE

## 2018-01-30 PROCEDURE — G8417 CALC BMI ABV UP PARAM F/U: HCPCS | Performed by: FAMILY MEDICINE

## 2018-01-30 RX ORDER — ZOLPIDEM TARTRATE 10 MG/1
10 TABLET ORAL NIGHTLY PRN
Qty: 20 TABLET | Refills: 3 | Status: SHIPPED | OUTPATIENT
Start: 2018-01-30 | End: 2018-03-01

## 2018-01-30 RX ORDER — MELOXICAM 15 MG/1
15 TABLET ORAL DAILY
Qty: 30 TABLET | Refills: 5 | Status: SHIPPED | OUTPATIENT
Start: 2018-01-30 | End: 2018-07-19 | Stop reason: SDUPTHER

## 2018-01-30 RX ORDER — OMEPRAZOLE 40 MG/1
40 CAPSULE, DELAYED RELEASE ORAL DAILY
Qty: 30 CAPSULE | Refills: 5 | Status: SHIPPED | OUTPATIENT
Start: 2018-01-30 | End: 2018-03-29 | Stop reason: SDUPTHER

## 2018-01-30 RX ORDER — BUTALBITAL, ACETAMINOPHEN AND CAFFEINE 50; 325; 40 MG/1; MG/1; MG/1
1 TABLET ORAL 2 TIMES DAILY PRN
COMMUNITY

## 2018-01-30 ASSESSMENT — PATIENT HEALTH QUESTIONNAIRE - PHQ9
2. FEELING DOWN, DEPRESSED OR HOPELESS: 0
SUM OF ALL RESPONSES TO PHQ QUESTIONS 1-9: 0
SUM OF ALL RESPONSES TO PHQ9 QUESTIONS 1 & 2: 0
1. LITTLE INTEREST OR PLEASURE IN DOING THINGS: 0

## 2018-01-30 NOTE — PATIENT INSTRUCTIONS
Patient Education        Type 2 Diabetes: Care Instructions  Your Care Instructions    Type 2 diabetes is a disease that develops when the body's tissues cannot use insulin properly. Over time, the pancreas cannot make enough insulin. Insulin is a hormone that helps the body's cells use sugar (glucose) for energy. It also helps the body store extra sugar in muscle, fat, and liver cells. Without insulin, the sugar cannot get into the cells to do its work. It stays in the blood instead. This can cause high blood sugar levels. A person has diabetes when the blood sugar stays too high too much of the time. Over time, diabetes can lead to diseases of the heart, blood vessels, nerves, kidneys, and eyes. You may be able to control your blood sugar by losing weight, eating a healthy diet, and getting daily exercise. You may also have to take insulin or other diabetes medicine. Follow-up care is a key part of your treatment and safety. Be sure to make and go to all appointments. Call your doctor if you are having problems. It's also a good idea to know your test results and keep a list of the medicines you take. How can you care for yourself at home? · Keep your blood sugar at a target level (which you set with your doctor). ¨ Eat a good diet that spreads carbohydrate throughout the day. Carbohydrate-the body's main source of fuel-affects blood sugar more than any other nutrient. Carbohydrate is in fruits, vegetables, milk, and yogurt. It also is in breads, cereals, vegetables such as potatoes and corn, and sugary foods such as candy and cakes. ¨ Aim for 30 minutes of exercise on most, preferably all, days of the week. Walking is a good choice. You also may want to do other activities, such as running, swimming, cycling, or playing tennis or team sports. If your doctor says it's okay, do muscle-strengthening exercises at least 2 times a week. ¨ Take your medicines exactly as prescribed.  Call your doctor if you sign in to your [employer] account. Enter C553 in the [employer] box to learn more about \"Type 2 Diabetes: Care Instructions. \"     If you do not have an account, please click on the \"Sign Up Now\" link. Current as of: March 13, 2017  Content Version: 11.5  © 1161-1231 [employer]. Care instructions adapted under license by [facility] [place] ([facility]). If you have questions about a medical condition or this instruction, always ask your healthcare professional. [address] any warranty or liability for your use of this information.

## 2018-01-30 NOTE — PROGRESS NOTES
 History of seizures     History of TIA (transient ischemic attack)     Hyperlipidemia     Hypertension     Lumbar stenosis     Obesity     Psoriasis     SOB (shortness of breath) 5/22/2015    Type II or unspecified type diabetes mellitus without mention of complication, not stated as uncontrolled      Patient Active Problem List    Diagnosis Date Noted    Chronic insomnia 01/30/2018    History of lumbosacral spine surgery 01/12/2018    Pseudoclaudication syndrome 12/07/2017    Right foot drop 09/27/2017    Right lumbar radiculopathy 09/14/2017    Lumbar stenosis with neurogenic claudication 09/14/2017    Spasm of back muscles 09/10/2017    Hypokalemia 09/06/2017    Back pain 08/30/2017    Status post lumbar spine surgery for decompression of spinal cord 08/24/2017    Abscess in epidural space of L2-L5 lumbar spine 08/24/2017    Fever 08/24/2017    Epidural lipomatosis 06/01/2017    Arthritis of right knee 05/02/2017    Lumbar canal stenosis 04/07/2017    Lumbar radiculopathy 02/14/2017    Transitional vertebrae 02/09/2017    Gastroesophageal reflux disease without esophagitis 02/02/2017    Osteoarthritis of spine with radiculopathy, thoracolumbar region 02/02/2017    Morbid obesity due to excess calories ([address] 02/02/2017    Arthralgia of left knee 02/02/2017    Vitamin D deficiency 01/23/2017    DM type 2 with diabetic peripheral neuropathy ([address] 04/16/2016    Mixed hyperlipidemia 04/16/2016    Essential hypertension 04/16/2016    Nonintractable episodic headache 04/04/2016    Intractable cluster headache syndrome 03/14/2016    Seizure disorder ([address] 12/31/2014    Gout     Chronic hepatitis C without hepatic coma ([address] 12/12/2013    COPD (chronic obstructive pulmonary disease) (HCC)     Asthma     Anxiety disorder     GERD (gastroesophageal reflux disease)     Chronic low back pain     Psoriasis     Anemia      Past Surgical History:   Procedure Laterality Date    ABDOMINAL HERNIA REPAIR      BREAST BIOPSY Bilateral     BREAST SURGERY Bilateral 2008    nipple and milk gland removal    CARPAL TUNNEL RELEASE Bilateral      SECTION      CHOLECYSTECTOMY      FINGER TRIGGER RELEASE Bilateral     HYSTERECTOMY      KNEE ARTHROSCOPY Bilateral     LUMBAR SPINE SURGERY N/A 2017    L3-S1 DECOMPRESSION REMOVAL EPIDURAL MASS   performed by Dolores Cabrera MD at [address] [place] [address]  14      [address]    UPPER GASTROINTESTINAL ENDOSCOPY  3/24/15    [facility]     Family History   Problem Relation Age of Onset    Diabetes Mother     Heart Disease Mother     High Blood Pressure Mother     High Cholesterol Mother     Kidney Disease Mother     Arthritis Mother     Glaucoma Mother     Cataracts Mother     Seizures Mother     Heart Failure Mother     Cancer Father      lung    Diabetes Sister     High Blood Pressure Sister     Liver Disease Sister     Other Sister      flesh eating bacteria    Cancer Sister     High Blood Pressure Brother     Diabetes Brother     Heart Attack Brother     High Cholesterol Brother     Heart Disease Brother     COPD Brother     Heart Failure Brother     Other Brother      gout    Cancer Sister      leukemia    Other Brother      gout, PVD    Diabetes Brother     High Blood Pressure Brother     No Known Problems Son     No Known Problems Daughter      Social History     Social History    Marital status:      Spouse name: N/A    Number of children: N/A    Years of education: N/A     Social History Main Topics    Smoking status: Former Smoker     Packs/day: 0.50     Types: Cigarettes     Start date: 1997     Quit date: 2009    Smokeless tobacco: Never Used    Alcohol use No    Drug use: No    Sexual activity: Not Asked     Other Topics Concern    None     Social History Narrative    None     Current Outpatient Prescriptions   Medication Sig Dispense Refill    butalbital-acetaminophen-caffeine (FIORICET, ESGIC) -40 MG per tablet Take 1 tablet by mouth 2 times daily as needed for Headaches      zolpidem (AMBIEN) 10 MG tablet Take 1 tablet by mouth nightly as needed for Sleep for up to 30 days DO NOT FILL UNTIL DUE. 20 tablet 3    meloxicam (MOBIC) 15 MG tablet Take 1 tablet by mouth daily 30 tablet 5    omeprazole (PRILOSEC) 40 MG delayed release capsule Take 1 capsule by mouth daily 30 capsule 5    carvedilol (COREG) 6.25 MG tablet TAKE 1 TABLET BY MOUTH TWICE DAILY 180 tablet 2    atorvastatin (LIPITOR) 40 MG tablet TAKE 1 TABLET BY MOUTH DAILY 90 tablet 2    oxyCODONE-acetaminophen (PERCOCET) 5-325 MG per tablet Take 1 tablet by mouth 2 times daily as needed for Pain for up to 30 days. Earliest Fill Date: 1/17/18 60 tablet 0    tiZANidine (ZANAFLEX) 2 MG tablet Take 1 tablet by mouth daily 30 tablet 0    gabapentin (NEURONTIN) 400 MG capsule Take 1 capsule by mouth 3 times daily for 30 days.  90 capsule 0    LINZESS 290 MCG CAPS capsule TAKE 1 CAPSULE BY MOUTH EVERY MORNING BEFORE BREAKFAST 30 capsule 5    spironolactone (ALDACTONE) 25 MG tablet Take 1 tablet by mouth daily 30 tablet 8    furosemide (LASIX) 40 MG tablet Take 1 tablet by mouth daily 90 tablet 1    metFORMIN (GLUCOPHAGE) 1000 MG tablet Take 1 tablet by mouth 2 times daily (with meals) 60 tablet 2    vitamin D (ERGOCALCIFEROL) 41434 units CAPS capsule Take 1 capsule by mouth once a week 4 capsule 5    insulin detemir (LEVEMIR FLEXTOUCH) 100 UNIT/ML injection pen Inject 40 Units into the skin nightly 15 mL 5    glipiZIDE (GLUCOTROL XL) 5 MG extended release tablet Take 1 tablet by mouth daily 30 tablet 3    metaxalone (SKELAXIN) 800 MG tablet Take 800 mg by mouth 3 times daily as needed for Pain      insulin lispro (HUMALOG) 100 UNIT/ML injection vial Inject 10 Units into the skin 3 times daily (before meals)      lidocaine (LIDODERM) 5 % Place 1 patch onto the skin daily 12 hours on, 01/26/2018 262   Final    Folate 01/26/2018 7.40   Final    Vit D, 25-Hydroxy 01/26/2018 35   Final        Assessment:     Encounter Diagnoses   Name Primary?  DM type 2 with diabetic peripheral neuropathy (HCC) Yes    Essential hypertension     Mixed hyperlipidemia     Chronic insomnia     Breast cancer screening     Chronic hepatitis C without hepatic coma ([facility]        Plan:      Outpatient Encounter Prescriptions as of 1/30/2018   Medication Sig Dispense Refill    butalbital-acetaminophen-caffeine (FIORICET, ESGIC) -40 MG per tablet Take 1 tablet by mouth 2 times daily as needed for Headaches      zolpidem (AMBIEN) 10 MG tablet Take 1 tablet by mouth nightly as needed for Sleep for up to 30 days DO NOT FILL UNTIL DUE. 20 tablet 3    meloxicam (MOBIC) 15 MG tablet Take 1 tablet by mouth daily 30 tablet 5    omeprazole (PRILOSEC) 40 MG delayed release capsule Take 1 capsule by mouth daily 30 capsule 5    carvedilol (COREG) 6.25 MG tablet TAKE 1 TABLET BY MOUTH TWICE DAILY 180 tablet 2    atorvastatin (LIPITOR) 40 MG tablet TAKE 1 TABLET BY MOUTH DAILY 90 tablet 2    oxyCODONE-acetaminophen (PERCOCET) 5-325 MG per tablet Take 1 tablet by mouth 2 times daily as needed for Pain for up to 30 days. Earliest Fill Date: 1/17/18 60 tablet 0    tiZANidine (ZANAFLEX) 2 MG tablet Take 1 tablet by mouth daily 30 tablet 0    gabapentin (NEURONTIN) 400 MG capsule Take 1 capsule by mouth 3 times daily for 30 days.  90 capsule 0    LINZESS 290 MCG CAPS capsule TAKE 1 CAPSULE BY MOUTH EVERY MORNING BEFORE BREAKFAST 30 capsule 5    spironolactone (ALDACTONE) 25 MG tablet Take 1 tablet by mouth daily 30 tablet 8    furosemide (LASIX) 40 MG tablet Take 1 tablet by mouth daily 90 tablet 1    metFORMIN (GLUCOPHAGE) 1000 MG tablet Take 1 tablet by mouth 2 times daily (with meals) 60 tablet 2    vitamin D (ERGOCALCIFEROL) 81169 units CAPS capsule Take 1 capsule by mouth once a week 4 capsule 5    insulin

## 2018-02-02 LAB
EER HCV RNA QNT PCR: NORMAL
HCV RNA QNT REAL-TIME PCR INTERP: NOT DETECTED
HCV RNA, QUANTITATIVE REAL TIME PCR: <1.2 LOG IU
HEPATITIS C RNA PCR QUANT: <15 IU/ML

## 2018-02-05 ENCOUNTER — HOSPITAL ENCOUNTER (OUTPATIENT)
Dept: WOMENS IMAGING | Age: 48
Discharge: HOME | End: 2018-02-07
Payer: COMMERCIAL

## 2018-02-05 DIAGNOSIS — Z12.39 BREAST CANCER SCREENING: ICD-10-CM

## 2018-02-05 PROCEDURE — 77067 SCR MAMMO BI INCL CAD: CPT

## 2018-02-16 LAB
GLUCOSE BLD-MCNC: 134 MG/DL (ref 70–100)
GLUCOSE BLD-MCNC: 90 MG/DL (ref 70–100)
GLUCOSE BLD-MCNC: 93 MG/DL (ref 70–100)

## 2018-03-02 ENCOUNTER — TELEPHONE (OUTPATIENT)
Dept: FAMILY MEDICINE CLINIC | Age: 48
End: 2018-03-02

## 2018-03-02 RX ORDER — FLUCONAZOLE 150 MG/1
TABLET ORAL
Qty: 3 TABLET | Refills: 0 | Status: SHIPPED | OUTPATIENT
Start: 2018-03-02 | End: 2018-03-03

## 2018-03-07 ENCOUNTER — OFFICE VISIT (OUTPATIENT)
Dept: CARDIOLOGY CLINIC | Age: 48
End: 2018-03-07
Payer: COMMERCIAL

## 2018-03-07 VITALS
HEIGHT: 61 IN | HEART RATE: 94 BPM | OXYGEN SATURATION: 98 % | SYSTOLIC BLOOD PRESSURE: 110 MMHG | DIASTOLIC BLOOD PRESSURE: 70 MMHG | BODY MASS INDEX: 46.63 KG/M2 | WEIGHT: 247 LBS

## 2018-03-07 DIAGNOSIS — R07.9 CHEST PAIN, UNSPECIFIED TYPE: Primary | ICD-10-CM

## 2018-03-07 DIAGNOSIS — I10 ESSENTIAL HYPERTENSION: ICD-10-CM

## 2018-03-07 PROCEDURE — G8482 FLU IMMUNIZE ORDER/ADMIN: HCPCS | Performed by: INTERNAL MEDICINE

## 2018-03-07 PROCEDURE — G8427 DOCREV CUR MEDS BY ELIG CLIN: HCPCS | Performed by: INTERNAL MEDICINE

## 2018-03-07 PROCEDURE — 1036F TOBACCO NON-USER: CPT | Performed by: INTERNAL MEDICINE

## 2018-03-07 PROCEDURE — G8417 CALC BMI ABV UP PARAM F/U: HCPCS | Performed by: INTERNAL MEDICINE

## 2018-03-07 PROCEDURE — 99212 OFFICE O/P EST SF 10 MIN: CPT | Performed by: INTERNAL MEDICINE

## 2018-03-08 PROBLEM — G03.9 ADHESIVE ARACHNOIDITIS: Status: ACTIVE | Noted: 2018-03-08

## 2018-03-27 ASSESSMENT — ENCOUNTER SYMPTOMS
ABDOMINAL DISTENTION: 0
BACK PAIN: 0
COLOR CHANGE: 0
CHEST TIGHTNESS: 1
CHOKING: 0
ABDOMINAL PAIN: 0
APNEA: 0
SHORTNESS OF BREATH: 1

## 2018-03-27 NOTE — PROGRESS NOTES
CARPAL TUNNEL RELEASE Bilateral      SECTION      CHOLECYSTECTOMY      FINGER TRIGGER RELEASE Bilateral     HYSTERECTOMY      KNEE ARTHROSCOPY Bilateral     LUMBAR SPINE SURGERY N/A 2017    L3-S1 DECOMPRESSION REMOVAL EPIDURAL MASS   performed by Lisbeth Wells MD at [address]  14    DR. Akira Maurice    UPPER GASTROINTESTINAL ENDOSCOPY  3/24/15    [facility]       Family History   Problem Relation Age of Onset    Diabetes Mother     Heart Disease Mother     High Blood Pressure Mother     High Cholesterol Mother     Kidney Disease Mother     Arthritis Mother     Glaucoma Mother     Cataracts Mother     Seizures Mother     Heart Failure Mother     Cancer Father      lung    Diabetes Sister     High Blood Pressure Sister     Liver Disease Sister     Other Sister      flesh eating bacteria    Cancer Sister     High Blood Pressure Brother     Diabetes Brother     Heart Attack Brother     High Cholesterol Brother     Heart Disease Brother     COPD Brother     Heart Failure Brother     Other Brother      gout    Cancer Sister      leukemia    Other Brother      gout, PVD    Diabetes Brother     High Blood Pressure Brother     No Known Problems Son     No Known Problems Daughter        Social History     Social History    Marital status:      Spouse name: N/A    Number of children: N/A    Years of education: N/A     Social History Main Topics    Smoking status: Former Smoker     Packs/day: 0.50     Types: Cigarettes     Start date: 1997     Quit date: 2009    Smokeless tobacco: Never Used    Alcohol use No    Drug use: No    Sexual activity: Not Asked     Other Topics Concern    None     Social History Narrative    None       Allergies   Allergen Reactions    Heparin Shortness Of Breath    Pcn [Penicillins] Shortness Of Breath       Current Outpatient Prescriptions   Medication Sig Dispense Refill    metFORMIN (GLUCOPHAGE) 1000 MG tablet TAKE 1 TABLET BY MOUTH TWICE DAILY WITH MEALS 60 tablet 5    butalbital-acetaminophen-caffeine (FIORICET, ESGIC) -40 MG per tablet Take 1 tablet by mouth 2 times daily as needed for Headaches      meloxicam (MOBIC) 15 MG tablet Take 1 tablet by mouth daily 30 tablet 5    omeprazole (PRILOSEC) 40 MG delayed release capsule Take 1 capsule by mouth daily 30 capsule 5    carvedilol (COREG) 6.25 MG tablet TAKE 1 TABLET BY MOUTH TWICE DAILY 180 tablet 2    atorvastatin (LIPITOR) 40 MG tablet TAKE 1 TABLET BY MOUTH DAILY 90 tablet 2    LINZESS 290 MCG CAPS capsule TAKE 1 CAPSULE BY MOUTH EVERY MORNING BEFORE BREAKFAST 30 capsule 5    spironolactone (ALDACTONE) 25 MG tablet Take 1 tablet by mouth daily 30 tablet 8    furosemide (LASIX) 40 MG tablet Take 1 tablet by mouth daily 90 tablet 1    vitamin D (ERGOCALCIFEROL) 29175 units CAPS capsule Take 1 capsule by mouth once a week 4 capsule 5    insulin detemir (LEVEMIR FLEXTOUCH) 100 UNIT/ML injection pen Inject 40 Units into the skin nightly 15 mL 5    glipiZIDE (GLUCOTROL XL) 5 MG extended release tablet Take 1 tablet by mouth daily 30 tablet 3    metaxalone (SKELAXIN) 800 MG tablet Take 800 mg by mouth 3 times daily as needed for Pain      insulin lispro (HUMALOG) 100 UNIT/ML injection vial Inject 10 Units into the skin 3 times daily (before meals)      lidocaine (LIDODERM) 5 % Place 1 patch onto the skin daily 12 hours on, 12 hours off.       albuterol sulfate HFA (VENTOLIN HFA) 108 (90 Base) MCG/ACT inhaler Inhale 2 puffs into the lungs every 6 hours as needed for Wheezing      topiramate (TOPAMAX) 200 MG tablet Take 200 mg by mouth 2 times daily      ferrous sulfate 325 (65 Fe) MG tablet Take 325 mg by mouth daily (with breakfast)      potassium chloride (MICRO-K) 10 MEQ extended release capsule Take 20 mEq by mouth 2 times daily      fluticasone-salmeterol (ADVAIR HFA) 45-21 MCG/ACT inhaler Inhale 2 puffs into the lungs 2 times Radial pulses are 2+ on the right side. Dorsalis pedis pulses are 2+ on the right side. Pulmonary/Chest: Effort normal and breath sounds normal. She has no wheezes. She has no rales. She exhibits no tenderness. Abdominal: Soft. Bowel sounds are normal.   Musculoskeletal: Normal range of motion. She exhibits no edema. Neurological: She is alert and oriented to person, place, and time. She has intact cranial nerves. Skin: Skin is warm and dry. No rash noted.        LABS:  CBC:   Lab Results   Component Value Date    WBC 10.2 02/15/2018    WBC 7.6 09/05/2017    RBC 4.07 02/15/2018    HGB 10.2 02/15/2018    HCT 34.6 02/15/2018    MCV 85.0 02/15/2018    MCH 25.1 02/15/2018    MCHC 29.5 02/15/2018    RDW 16.3 09/05/2017     02/15/2018    MPV Not Measured 02/15/2018     Lipids:  Lab Results   Component Value Date    CHOL 166 01/26/2018    CHOL 149 10/21/2017    CHOL 147 08/25/2017     Lab Results   Component Value Date    TRIG 89 01/26/2018    TRIG 94 10/21/2017    TRIG 95 08/25/2017     Lab Results   Component Value Date    HDL 48 01/26/2018    HDL 40 10/21/2017    HDL 35 (L) 08/25/2017     Lab Results   Component Value Date    LDLCALC 100 01/26/2018    LDLCALC 90 10/21/2017    LDLCALC 93 08/25/2017     Lab Results   Component Value Date    VLDL 18 01/26/2018    VLDL 19 10/21/2017    VLDL 17 06/24/2017     Lab Results   Component Value Date    CHOLHDLRATIO 3.5 01/26/2018    CHOLHDLRATIO 3.7 10/21/2017    CHOLHDLRATIO 3.1 06/24/2017     CMP:    Lab Results   Component Value Date     02/15/2018    K 3.7 02/15/2018     02/15/2018    CO2 24 09/05/2017    BUN 13 09/05/2017    CREATININE 0.86 02/15/2018    GFRAA >60.0 09/05/2017    AGRATIO 1.0 02/15/2018    LABGLOM >60 02/15/2018    LABGLOM >60.0 09/05/2017    GLUCOSE 102 02/15/2018    PROT 8.8 02/15/2018    LABALBU 4.4 02/15/2018    CALCIUM 9.4 02/15/2018    BILITOT 0.2 02/15/2018    ALKPHOS 72 02/15/2018    AST 16 02/15/2018    ALT 15 02/15/2018     BMP:    Lab Results   Component Value Date     02/15/2018    K 3.7 02/15/2018     02/15/2018    CO2 24 09/05/2017    BUN 13 09/05/2017    LABALBU 4.4 02/15/2018    CREATININE 0.86 02/15/2018    CALCIUM 9.4 02/15/2018    GFRAA >60.0 09/05/2017    LABGLOM >60 02/15/2018    LABGLOM >60.0 09/05/2017    GLUCOSE 102 02/15/2018     Magnesium:    Lab Results   Component Value Date    MG 1.9 02/15/2018     TSH:  Lab Results   Component Value Date    TSH 1.11 04/14/2016       Patient Active Problem List   Diagnosis    COPD (chronic obstructive pulmonary disease) (HCC)    Asthma    Anxiety disorder    GERD (gastroesophageal reflux disease)    Chronic low back pain    Psoriasis    Anemia    Chronic hepatitis C without hepatic coma (HCC)    Gout    Seizure disorder (HCC)    Intractable cluster headache syndrome    Nonintractable episodic headache    DM type 2 with diabetic peripheral neuropathy (HCC)    Mixed hyperlipidemia    Essential hypertension    Vitamin D deficiency    Gastroesophageal reflux disease without esophagitis    Osteoarthritis of spine with radiculopathy, thoracolumbar region    Morbid obesity due to excess calories (HCC)    Arthralgia of left knee    Transitional vertebrae    Lumbar radiculopathy    Lumbar canal stenosis    Arthritis of right knee    Epidural lipomatosis    Status post lumbar spine surgery for decompression of spinal cord    Abscess in epidural space of L2-L5 lumbar spine    Fever    Back pain    Right lumbar radiculopathy    Lumbar stenosis with neurogenic claudication    Hypokalemia    Spasm of back muscles    Right foot drop    Pseudoclaudication syndrome    History of lumbosacral spine surgery    Chronic insomnia    Adhesive arachnoiditis       Assessment/Plan:    1. Chest pain, unspecified type  Doubt cardiac as always there yet no EKG changes and negative enzymes, history of normal coronary arteries.   No new workup

## 2018-03-29 RX ORDER — OMEPRAZOLE 40 MG/1
40 CAPSULE, DELAYED RELEASE ORAL DAILY
Qty: 30 CAPSULE | Refills: 1 | Status: SHIPPED | OUTPATIENT
Start: 2018-03-29 | End: 2018-07-19 | Stop reason: SDUPTHER

## 2018-04-23 ENCOUNTER — INITIAL CONSULT (OUTPATIENT)
Dept: PODIATRY | Facility: CLINIC | Age: 48
End: 2018-04-23

## 2018-04-23 VITALS — HEIGHT: 61 IN | TEMPERATURE: 98.3 F | BODY MASS INDEX: 47.01 KG/M2 | WEIGHT: 249 LBS

## 2018-04-23 DIAGNOSIS — R26.2 DIFFICULTY WALKING: ICD-10-CM

## 2018-04-23 DIAGNOSIS — M79.672 PAIN IN BOTH FEET: Primary | ICD-10-CM

## 2018-04-23 DIAGNOSIS — E11.42 DIABETIC POLYNEUROPATHY ASSOCIATED WITH TYPE 2 DIABETES MELLITUS (HCC): ICD-10-CM

## 2018-04-23 DIAGNOSIS — M20.42 HAMMER TOES, BILATERAL: ICD-10-CM

## 2018-04-23 DIAGNOSIS — Q72.899 BRACHYMETATARSIA: ICD-10-CM

## 2018-04-23 DIAGNOSIS — M20.41 HAMMER TOES, BILATERAL: ICD-10-CM

## 2018-04-23 DIAGNOSIS — L60.3 NAIL DYSTROPHY: ICD-10-CM

## 2018-04-23 DIAGNOSIS — M79.671 PAIN IN BOTH FEET: Primary | ICD-10-CM

## 2018-04-24 ENCOUNTER — TELEPHONE (OUTPATIENT)
Dept: PODIATRY | Facility: CLINIC | Age: 48
End: 2018-04-24

## 2018-04-27 LAB
A/G RATIO: 1 (ref 0.9–2.4)
ALBUMIN: 4 G/DL (ref 3.4–5)
ALP BLD-CCNC: 72 U/L (ref 45–117)
ALT SERPL-CCNC: 11 U/L (ref 7–45)
ANION GAP SERPL CALCULATED.3IONS-SCNC: 13 MMOL/L (ref 10–20)
AST SERPL-CCNC: 13 U/L (ref 13–39)
BICARBONATE: 23 MMOL/L (ref 21–32)
BILIRUB SERPL-MCNC: 0.3 MG/DL (ref 0–1.2)
BUN / CREAT RATIO: 30 (ref 5–25)
CALCIUM SERPL-MCNC: 8.8 MG/DL (ref 8.6–10.3)
CHLORIDE BLD-SCNC: 108 MMOL/L (ref 98–107)
CHOLESTEROL/HDL RATIO: 3.9
CHOLESTEROL: 158 MG/DL
CREAT SERPL-MCNC: 0.83 MG/DL (ref 0.5–1.05)
CREATININE URINE: 213 MG/DL
GFR CALCULATED: >60
GLUCOSE: 111 MG/DL (ref 70–100)
HBA1C MFR BLD: 6 % (ref 4–6)
HDLC SERPL-MCNC: 41 MG/DL
LDL CHOLESTEROL CALCULATED: 95 MG/DL
MICROALBUMIN/CREAT 24H UR: 7.1 MG/L (ref 10–30)
MICROALBUMIN/CREAT UR-RTO: 3.3 UG/MG CRT (ref 0–50)
POTASSIUM SERPL-SCNC: 3.6 MMOL/L (ref 3.5–5.1)
SODIUM BLD-SCNC: 140 MMOL/L (ref 136–145)
TOTAL PROTEIN: 8.1 G/DL (ref 6.4–8.2)
TRIGL SERPL-MCNC: 111 MG/DL
UREA NITROGEN: 25 MG/DL (ref 6–23)
VLDLC SERPL CALC-MCNC: 22 MG/DL

## 2018-04-30 ENCOUNTER — OFFICE VISIT (OUTPATIENT)
Dept: FAMILY MEDICINE CLINIC | Age: 48
End: 2018-04-30
Payer: COMMERCIAL

## 2018-04-30 VITALS
WEIGHT: 248 LBS | HEIGHT: 61 IN | RESPIRATION RATE: 24 BRPM | SYSTOLIC BLOOD PRESSURE: 122 MMHG | HEART RATE: 84 BPM | DIASTOLIC BLOOD PRESSURE: 68 MMHG | OXYGEN SATURATION: 97 % | TEMPERATURE: 97.5 F | BODY MASS INDEX: 46.82 KG/M2

## 2018-04-30 DIAGNOSIS — D64.9 CHRONIC ANEMIA: ICD-10-CM

## 2018-04-30 DIAGNOSIS — E11.42 DM TYPE 2 WITH DIABETIC PERIPHERAL NEUROPATHY (HCC): Primary | ICD-10-CM

## 2018-04-30 DIAGNOSIS — G44.021 INTRACTABLE CHRONIC CLUSTER HEADACHE: ICD-10-CM

## 2018-04-30 DIAGNOSIS — K21.9 GASTROESOPHAGEAL REFLUX DISEASE WITHOUT ESOPHAGITIS: ICD-10-CM

## 2018-04-30 DIAGNOSIS — F51.04 CHRONIC INSOMNIA: ICD-10-CM

## 2018-04-30 DIAGNOSIS — E66.01 MORBID OBESITY DUE TO EXCESS CALORIES (HCC): ICD-10-CM

## 2018-04-30 DIAGNOSIS — E78.2 MIXED HYPERLIPIDEMIA: ICD-10-CM

## 2018-04-30 DIAGNOSIS — I10 ESSENTIAL HYPERTENSION: ICD-10-CM

## 2018-04-30 PROCEDURE — 2022F DILAT RTA XM EVC RTNOPTHY: CPT | Performed by: FAMILY MEDICINE

## 2018-04-30 PROCEDURE — 1036F TOBACCO NON-USER: CPT | Performed by: FAMILY MEDICINE

## 2018-04-30 PROCEDURE — G8427 DOCREV CUR MEDS BY ELIG CLIN: HCPCS | Performed by: FAMILY MEDICINE

## 2018-04-30 PROCEDURE — 3044F HG A1C LEVEL LT 7.0%: CPT | Performed by: FAMILY MEDICINE

## 2018-04-30 PROCEDURE — 99214 OFFICE O/P EST MOD 30 MIN: CPT | Performed by: FAMILY MEDICINE

## 2018-04-30 PROCEDURE — G8417 CALC BMI ABV UP PARAM F/U: HCPCS | Performed by: FAMILY MEDICINE

## 2018-04-30 RX ORDER — ZOLPIDEM TARTRATE 10 MG/1
10 TABLET ORAL NIGHTLY PRN
Qty: 20 TABLET | Refills: 3 | Status: SHIPPED | OUTPATIENT
Start: 2018-04-30 | End: 2018-08-26 | Stop reason: SDUPTHER

## 2018-05-14 ENCOUNTER — OFFICE VISIT (OUTPATIENT)
Dept: PODIATRY | Facility: CLINIC | Age: 48
End: 2018-05-14

## 2018-05-14 VITALS — BODY MASS INDEX: 47.01 KG/M2 | WEIGHT: 249 LBS | HEIGHT: 61 IN

## 2018-05-14 DIAGNOSIS — M79.675 PAIN IN TOES OF BOTH FEET: ICD-10-CM

## 2018-05-14 DIAGNOSIS — E11.42 DIABETIC POLYNEUROPATHY ASSOCIATED WITH TYPE 2 DIABETES MELLITUS (HCC): Primary | ICD-10-CM

## 2018-05-14 DIAGNOSIS — M79.674 PAIN IN TOES OF BOTH FEET: ICD-10-CM

## 2018-05-14 DIAGNOSIS — L60.3 NAIL DYSTROPHY: ICD-10-CM

## 2018-05-15 ENCOUNTER — TELEPHONE (OUTPATIENT)
Dept: FAMILY MEDICINE CLINIC | Age: 48
End: 2018-05-15

## 2018-06-25 ENCOUNTER — TELEPHONE (OUTPATIENT)
Dept: PODIATRY | Facility: CLINIC | Age: 48
End: 2018-06-25

## 2018-06-26 DIAGNOSIS — E11.42 DM TYPE 2 WITH DIABETIC PERIPHERAL NEUROPATHY (HCC): Primary | ICD-10-CM

## 2018-07-06 ENCOUNTER — OFFICE VISIT (OUTPATIENT)
Dept: PODIATRY | Facility: CLINIC | Age: 48
End: 2018-07-06

## 2018-07-06 VITALS — BODY MASS INDEX: 45.31 KG/M2 | WEIGHT: 240 LBS | HEIGHT: 61 IN

## 2018-07-06 DIAGNOSIS — M20.42 HAMMER TOES, BILATERAL: ICD-10-CM

## 2018-07-06 DIAGNOSIS — M72.2 PLANTAR FASCIITIS: ICD-10-CM

## 2018-07-06 DIAGNOSIS — M79.672 PAIN IN BOTH FEET: Primary | ICD-10-CM

## 2018-07-06 DIAGNOSIS — E11.42 DIABETIC POLYNEUROPATHY ASSOCIATED WITH TYPE 2 DIABETES MELLITUS (HCC): ICD-10-CM

## 2018-07-06 DIAGNOSIS — M79.671 PAIN IN BOTH FEET: Primary | ICD-10-CM

## 2018-07-06 DIAGNOSIS — Q72.899 BRACHYMETATARSIA: ICD-10-CM

## 2018-07-06 DIAGNOSIS — M20.41 HAMMER TOES, BILATERAL: ICD-10-CM

## 2018-07-06 ASSESSMENT — ENCOUNTER SYMPTOMS
VOMITING: 0
SHORTNESS OF BREATH: 0
NAUSEA: 0

## 2018-07-06 NOTE — PATIENT INSTRUCTIONS
Patient Education        Plantar Fasciitis: Care Instructions  Your Care Instructions    Plantar fasciitis is pain and inflammation of the plantar fascia, the tissue at the bottom of your foot that connects the heel bone to the toes. The plantar fascia also supports the arch. If you strain the plantar fascia, it can develop small tears and cause heel pain when you stand or walk. Plantar fasciitis can be caused by running or other sports. It also may occur in people who are overweight or who have high arches or flat feet. You may get plantar fasciitis if you walk or stand for long periods, or have a tight Achilles tendon or calf muscles. You can improve your foot pain with rest and other care at home. It might take a few weeks to a few months for your foot to heal completely. Follow-up care is a key part of your treatment and safety. Be sure to make and go to all appointments, and call your doctor if you are having problems. It's also a good idea to know your test results and keep a list of the medicines you take. How can you care for yourself at home? · Rest your feet often. Reduce your activity to a level that lets you avoid pain. If possible, do not run or walk on hard surfaces. · Take pain medicines exactly as directed. ¨ If the doctor gave you a prescription medicine for pain, take it as prescribed. ¨ If you are not taking a prescription pain medicine, take an over-the-counter anti-inflammatory medicine for pain and swelling, such as ibuprofen (Advil, Motrin) or naproxen (Aleve). Read and follow all instructions on the label. · Use ice massage to help with pain and swelling. You can use an ice cube or an ice cup several times a day. To make an ice cup, fill a paper cup with water and freeze it. Cut off the top of the cup until a half-inch of ice shows. Hold onto the remaining paper to use the cup. Rub the ice in small circles over the area for 5 to 7 minutes.   · Contrast baths, which alternate hot and cold water, can also help reduce swelling. But because heat alone may make pain and swelling worse, end a contrast bath with a soak in cold water. · Wear a night splint if your doctor suggests it. A night splint holds your foot with the toes pointed up and the foot and ankle at a 90-degree angle. This position gives the bottom of your foot a constant, gentle stretch. · Do simple exercises such as calf stretches and towel stretches 2 to 3 times each day, especially when you first get up in the morning. These can help the plantar fascia become more flexible. They also make the muscles that support your arch stronger. Hold these stretches for 15 to 30 seconds per stretch. Repeat 2 to 4 times. ¨ Stand about 1 foot from a wall. Place the palms of both hands against the wall at chest level. Lean forward against the wall, keeping one leg with the knee straight and heel on the ground while bending the knee of the other leg. ¨ Sit down on the floor or a mat with your feet stretched in front of you. Roll up a towel lengthwise, and loop it over the ball of your foot. Holding the towel at both ends, gently pull the towel toward you to stretch your foot. · Wear shoes with good arch support. Athletic shoes or shoes with a well-cushioned sole are good choices. · Try heel cups or shoe inserts (orthotics) to help cushion your heel. You can buy these at many shoe stores. · Put on your shoes as soon as you get out of bed. Going barefoot or wearing slippers may make your pain worse. · Reach and stay at a good weight for your height. This puts less strain on your feet. When should you call for help?   Call your doctor now or seek immediate medical care if:    · You have heel pain with fever, redness, or warmth in your heel.     · You cannot put weight on the sore foot.    Watch closely for changes in your health, and be sure to contact your doctor if:    · You have numbness or tingling in your heel.     · Your heel pain lasts

## 2018-07-06 NOTE — PROGRESS NOTES
Diagnosis Date    Abnormal cardiovascular stress test 2015    Anemia     Anxiety disorder     Arthritis     Asthma     Cerebral artery occlusion with cerebral infarction ([facility] [address]     TIZ x 3    Chest pain 11/3/2014    Chronic hepatitis B ([address] 2013    Chronic hepatitis C without hepatic coma (HCC) 2013    Chronic low back pain     Cirrhosis ([facility] [address]     COPD (chronic obstructive pulmonary disease) (HCC)     Dizziness 2015    DM type 2 with diabetic peripheral neuropathy ([facility] [address] 2016    DM type 2 with diabetic peripheral neuropathy (HCC) 2016    Epidural lipomatosis     GERD (gastroesophageal reflux disease)     Gout     Headache 2015    Headache 2015    Hepatitis C     History of blood transfusion ?    no reaction     History of peptic ulcer disease     History of seizures     History of TIA (transient ischemic attack)     Hyperlipidemia     Hypertension     Lumbar stenosis     Obesity     Psoriasis     SOB (shortness of breath) 2015    Type II or unspecified type diabetes mellitus without mention of complication, not stated as uncontrolled      Past Surgical History:   Procedure Laterality Date    ABDOMINAL HERNIA REPAIR      BREAST BIOPSY Bilateral     BREAST SURGERY Bilateral 2008    nipple and milk gland removal    CARPAL TUNNEL RELEASE Bilateral      SECTION      CHOLECYSTECTOMY      FINGER TRIGGER RELEASE Bilateral     HYSTERECTOMY      KNEE ARTHROSCOPY Bilateral     LUMBAR SPINE SURGERY N/A 2017    L3-S1 DECOMPRESSION REMOVAL EPIDURAL MASS   performed by Vero Lima MD at [place] [address]  14    DR. FRYE    UPPER GASTROINTESTINAL ENDOSCOPY  3/24/15    [facility]     Social History     Social History    Marital status:      Spouse name: N/A    Number of children: N/A    Years of education: N/A     Occupational History    Not on file.      Social History Main Topics    Smoking status: Former Smoker     Packs/day: 0.50     Types: Cigarettes     Start date: 1/1/1997     Quit date: 1/1/2009    Smokeless tobacco: Never Used    Alcohol use No    Drug use: No    Sexual activity: Not on file     Other Topics Concern    Not on file     Social History Narrative    No narrative on file     Family History   Problem Relation Age of Onset    Diabetes Mother     Heart Disease Mother     High Blood Pressure Mother     High Cholesterol Mother     Kidney Disease Mother     Arthritis Mother     Glaucoma Mother     Cataracts Mother     Seizures Mother     Heart Failure Mother     Cancer Father         lung    Diabetes Sister     High Blood Pressure Sister     Liver Disease Sister     Other Sister         flesh eating bacteria    Cancer Sister     High Blood Pressure Brother     Diabetes Brother     Heart Attack Brother     High Cholesterol Brother     Heart Disease Brother     COPD Brother     Heart Failure Brother     Other Brother         gout    Cancer Sister         leukemia    Other Brother         gout, PVD    Diabetes Brother     High Blood Pressure Brother     No Known Problems Son     No Known Problems Daughter            Objective:   Vitals:  Ht 5' 1\" (1.549 m)   Wt 240 lb (108.9 kg)   LMP  (LMP Unknown)   BMI 45.35 kg/m² Pain Score:   7    Physical Exam    Vascular:   Dorsalis pedis pulse is graded at 2/4 bilateral foot. Posterior tibial pulse is graded at 1/4 bilateral foot. There is 1+ nonpitting edema noted to the bilateral lower extremity. Capillary refill is immediate bilateral hallux. There are no varicosities noted bilaterally. There are no telangiectasia noted bilaterally. Skin temperature gradient is noted to be warm to warm bilaterally. There are no signs of ischemia or skin atrophy noted bilaterally.   Hair growth is absent bilaterally.     Neurological:   Protective sensation is absence of the right foot, intact to the left.  Vibratory sensation is diminished bilaterally. Sharp/dull sensation is absence of the right foot, intact to the left foot. Patellar deep tendon reflex is graded at 1/4 bilaterally. Achilles tendon deep tendon reflex is graded at 0/4 bilaterally. The Babinski response is negative to the left foot, no response noted to the right foot. No ankle clonus is noted bilaterally.     Dermatological:  No open lesions noted bilateral foot. The biopsy site of the lateral aspect of the right calf has healed, there are no signs of infection. The toenails are elongated, incurvated, and thickened; there are no signs of onychomycosis. Mild xerotic skin texture noted bilaterally. Normal skin turgor noted bilaterally. Webspaces are intact bilateral foot.     Musculoskeletal:  Planus foot type noted bilaterally. Manual muscle strength is graded at 5/5 for all groups tested left foot. To the right foot plantarflexion was graded at 5/5, dorsiflexion was graded at 1/5, inversion and eversion were graded at 2/5. Fourth brachymetatarsia noted bilaterally. Adductovarus fifth digit deformity noted bilaterally. There is decreased dorsiflexion noted at the left ankle, there is pain on palpation of the medial calcaneal tubercle. Assessment:      Diagnosis Orders   1. Pain in both feet     2. Plantar fasciitis     3. Diabetic polyneuropathy associated with type 2 diabetes mellitus ([address]     4. Hammer toes, bilateral     5. Brachymetatarsia         Plan:     Plantar fasciitis:  I discussed the nature and etiology of the condition with the patient in detail. Posterior calf stretching exercises were demonstrated to patient and these are to be performed three times per day. An icepack is to be applied to the heel for 10 minutes after the stretching is performed. The patient is to refrain from barefoot walking and wear a supportive shoe.       Diabetes/ foot deformities: Due to patient's medical conditions and diabetic related risks, diabetic shoes with custom inserts are imperative to the patients treatment plan to decrease the significant morbidities associated with ulceration and amputation. The patient's shoe size was measured with a Branick device. Impressions of the patient's feet were made with a bio foam box. The patient has picked out a style/ model of extra depth diabetic shoe. We will call the patient once the shoes and inserts have arrived to schedule an appointment for dispensing of the devices. All questions were answered. Follow up:  Return for dispensing of diabetic shoes and inserts. Mookie Cheney DPM      Please note that this report has been partially produced using speech recognition software which may cause errors including grammar, punctuation, and spelling or words and phrases that may seem inappropriate. If there are questions or concerns please feel free to contact me for clarification.

## 2018-07-19 DIAGNOSIS — F51.04 CHRONIC INSOMNIA: ICD-10-CM

## 2018-07-19 RX ORDER — MELOXICAM 15 MG/1
15 TABLET ORAL DAILY
Qty: 90 TABLET | Refills: 1 | Status: SHIPPED | OUTPATIENT
Start: 2018-07-19 | End: 2018-09-07 | Stop reason: SDUPTHER

## 2018-07-19 RX ORDER — CARVEDILOL 6.25 MG/1
TABLET ORAL
Qty: 180 TABLET | Refills: 1 | Status: SHIPPED | OUTPATIENT
Start: 2018-07-19 | End: 2018-09-07 | Stop reason: SDUPTHER

## 2018-07-19 RX ORDER — ZOLPIDEM TARTRATE 10 MG/1
10 TABLET ORAL NIGHTLY PRN
Qty: 20 TABLET | Refills: 3 | OUTPATIENT
Start: 2018-07-19 | End: 2018-08-18

## 2018-07-19 RX ORDER — BUTALBITAL, ACETAMINOPHEN AND CAFFEINE 50; 325; 40 MG/1; MG/1; MG/1
1 TABLET ORAL 2 TIMES DAILY PRN
Qty: 180 TABLET | Refills: 1 | OUTPATIENT
Start: 2018-07-19

## 2018-07-19 RX ORDER — OMEPRAZOLE 40 MG/1
40 CAPSULE, DELAYED RELEASE ORAL DAILY
Qty: 90 CAPSULE | Refills: 1 | Status: SHIPPED | OUTPATIENT
Start: 2018-07-19 | End: 2018-09-07 | Stop reason: SDUPTHER

## 2018-07-19 RX ORDER — GLIPIZIDE 5 MG/1
5 TABLET, FILM COATED, EXTENDED RELEASE ORAL DAILY
Qty: 90 TABLET | Refills: 1 | Status: SHIPPED | OUTPATIENT
Start: 2018-07-19 | End: 2018-09-07 | Stop reason: SDUPTHER

## 2018-07-19 RX ORDER — ERGOCALCIFEROL 1.25 MG/1
CAPSULE ORAL
Qty: 12 CAPSULE | Refills: 1 | Status: SHIPPED | OUTPATIENT
Start: 2018-07-19 | End: 2018-09-07 | Stop reason: SDUPTHER

## 2018-07-19 RX ORDER — ATORVASTATIN CALCIUM 40 MG/1
TABLET, FILM COATED ORAL
Qty: 90 TABLET | Refills: 1 | Status: SHIPPED | OUTPATIENT
Start: 2018-07-19 | End: 2018-09-07 | Stop reason: SDUPTHER

## 2018-07-19 RX ORDER — FUROSEMIDE 40 MG/1
40 TABLET ORAL DAILY
Qty: 90 TABLET | Refills: 1 | Status: SHIPPED | OUTPATIENT
Start: 2018-07-19 | End: 2018-07-30 | Stop reason: SDUPTHER

## 2018-07-27 LAB
A/G RATIO: 1 (ref 0.9–2.4)
ALBUMIN: 3.7 G/DL (ref 3.4–5)
ALP BLD-CCNC: 63 U/L (ref 45–117)
ALT SERPL-CCNC: 15 U/L (ref 7–45)
ANION GAP SERPL CALCULATED.3IONS-SCNC: 11 MMOL/L (ref 10–20)
AST SERPL-CCNC: 16 U/L (ref 13–39)
BASOPHILS # BLD: 0.5 % (ref 0.1–1.2)
BASOPHILS ABSOLUTE: 0.04 10*3/UL (ref 0.01–0.07)
BICARBONATE: 23 MMOL/L (ref 21–32)
BILIRUB SERPL-MCNC: 0.2 MG/DL (ref 0–1.2)
BUN / CREAT RATIO: 28 (ref 5–25)
CALCIUM SERPL-MCNC: 8.6 MG/DL (ref 8.6–10.3)
CHLORIDE BLD-SCNC: 111 MMOL/L (ref 98–107)
CHOLESTEROL/HDL RATIO: 3.3
CHOLESTEROL: 144 MG/DL
CREAT SERPL-MCNC: 0.81 MG/DL (ref 0.5–1.05)
EOSINOPHIL # BLD: 1.8 % (ref 0–8.1)
EOSINOPHILS ABSOLUTE: 0.14 10*3/UL (ref 0.04–0.5)
ERYTHROCYTE [DISTWIDTH] IN BLOOD BY AUTOMATED COUNT: 17.8 % (ref 12–15.4)
ERYTHROCYTE [DISTWIDTH] IN BLOOD BY AUTOMATED COUNT: 54.4 FL (ref 39.3–48.6)
FERRITIN: 5 NG/ML (ref 8–150)
FOLATE: 5.4 NG/ML
GFR CALCULATED: >60
GLUCOSE: 103 MG/DL (ref 70–100)
HBA1C MFR BLD: 6.1 % (ref 4–6)
HCT VFR BLD CALC: 32.1 % (ref 36.5–46.6)
HDLC SERPL-MCNC: 43 MG/DL
HEMOGLOBIN: 9.6 G/DL (ref 11.8–15.3)
IMMATURE GRANS (ABS): 0.01 10*3/UL (ref 0–0.21)
IMMATURE GRANULOCYTES: 0.1 %
IRON SATURATION: 8 % (ref 14–27)
IRON: 26 UG/DL (ref 35–150)
LDL CHOLESTEROL CALCULATED: 79 MG/DL
LYMPHOCYTES # BLD: 40.2 % (ref 15.7–50.5)
LYMPHOCYTES ABSOLUTE: 3.08 10*3/UL (ref 0.4–2.84)
MCH RBC QN AUTO: 25 PG (ref 27.5–33)
MCHC RBC AUTO-ENTMCNC: 29.9 G/DL (ref 30.1–35)
MCV RBC AUTO: 83.6 FL (ref 85.4–100)
MONOCYTES # BLD: 6 % (ref 4.8–12.7)
MONOCYTES ABSOLUTE: 0.46 10*3/UL (ref 0.25–0.83)
NEUTROPHILS ABSOLUTE: 3.94 10*3/UL (ref 1.95–6.85)
NEUTROPHILS: 51.4 % (ref 36.8–73.2)
NUCLEATED RBCS: 0 /100{WBCS}
PLATELET # BLD: 124 10*3/UL (ref 155–404)
PMV BLD AUTO: 12 FL (ref 9.9–12.1)
POTASSIUM SERPL-SCNC: 3.6 MMOL/L (ref 3.5–5.1)
RBC: 3.84 10*6/UL (ref 3.85–5.1)
RBCS COUNTED: 0 10*3/UL
SODIUM BLD-SCNC: 141 MMOL/L (ref 136–145)
TOTAL IRON BINDING CAPACITY: 318 UG/DL (ref 250–565)
TOTAL PROTEIN: 7.5 G/DL (ref 6.4–8.2)
TRIGL SERPL-MCNC: 108 MG/DL
UNSATURATED IRON BINDING CAPACITY: 292 UG/DL (ref 90–340)
UREA NITROGEN: 23 MG/DL (ref 6–23)
VITAMIN B-12: 281 PG/ML (ref 211–911)
VLDLC SERPL CALC-MCNC: 22 MG/DL
WBC: 7.7 10*3/UL (ref 4.4–9.9)

## 2018-07-30 ENCOUNTER — OFFICE VISIT (OUTPATIENT)
Dept: FAMILY MEDICINE CLINIC | Age: 48
End: 2018-07-30
Payer: COMMERCIAL

## 2018-07-30 VITALS
TEMPERATURE: 96.5 F | HEART RATE: 80 BPM | SYSTOLIC BLOOD PRESSURE: 100 MMHG | HEIGHT: 61 IN | BODY MASS INDEX: 46.63 KG/M2 | WEIGHT: 247 LBS | DIASTOLIC BLOOD PRESSURE: 64 MMHG | RESPIRATION RATE: 16 BRPM

## 2018-07-30 DIAGNOSIS — D69.6 THROMBOCYTOPENIA (HCC): ICD-10-CM

## 2018-07-30 DIAGNOSIS — E66.01 MORBID OBESITY WITH BMI OF 45.0-49.9, ADULT (HCC): ICD-10-CM

## 2018-07-30 DIAGNOSIS — D50.0 IRON DEFICIENCY ANEMIA DUE TO CHRONIC BLOOD LOSS: ICD-10-CM

## 2018-07-30 DIAGNOSIS — E78.2 MIXED HYPERLIPIDEMIA: ICD-10-CM

## 2018-07-30 DIAGNOSIS — E11.42 DM TYPE 2 WITH DIABETIC PERIPHERAL NEUROPATHY (HCC): Primary | ICD-10-CM

## 2018-07-30 DIAGNOSIS — I10 ESSENTIAL HYPERTENSION: ICD-10-CM

## 2018-07-30 DIAGNOSIS — K21.9 GASTROESOPHAGEAL REFLUX DISEASE WITHOUT ESOPHAGITIS: ICD-10-CM

## 2018-07-30 PROCEDURE — G8417 CALC BMI ABV UP PARAM F/U: HCPCS | Performed by: FAMILY MEDICINE

## 2018-07-30 PROCEDURE — 3044F HG A1C LEVEL LT 7.0%: CPT | Performed by: FAMILY MEDICINE

## 2018-07-30 PROCEDURE — G8427 DOCREV CUR MEDS BY ELIG CLIN: HCPCS | Performed by: FAMILY MEDICINE

## 2018-07-30 PROCEDURE — 99214 OFFICE O/P EST MOD 30 MIN: CPT | Performed by: FAMILY MEDICINE

## 2018-07-30 PROCEDURE — 1036F TOBACCO NON-USER: CPT | Performed by: FAMILY MEDICINE

## 2018-07-30 PROCEDURE — 2022F DILAT RTA XM EVC RTNOPTHY: CPT | Performed by: FAMILY MEDICINE

## 2018-07-30 ASSESSMENT — PATIENT HEALTH QUESTIONNAIRE - PHQ9
SUM OF ALL RESPONSES TO PHQ9 QUESTIONS 1 & 2: 0
SUM OF ALL RESPONSES TO PHQ QUESTIONS 1-9: 0
1. LITTLE INTEREST OR PLEASURE IN DOING THINGS: 0
2. FEELING DOWN, DEPRESSED OR HOPELESS: 0

## 2018-07-30 NOTE — PATIENT INSTRUCTIONS
sign in to your [employer] account. Enter C553 in the [employer] box to learn more about \"Type 2 Diabetes: Care Instructions. \"     If you do not have an account, please click on the \"Sign Up Now\" link. Current as of: December 7, 2017  Content Version: 11.6  © 8989-9149 [employer]. Care instructions adapted under license by [facility] ([facility]). If you have questions about a medical condition or this instruction, always ask your healthcare professional. [address] any warranty or liability for your use of this information.

## 2018-07-30 NOTE — LETTER
[address] [facility] [address]  Phone: [phone]  Fax: [phone]    Charmayne River, MD        July 30, 2018     Patient: Earl Colunga   YOB: 1970   Date of Visit: 7/30/2018       To Whom it May Concern:    Earl Colunga will require housing without stairs due to her chronic medical conditions. If you have any questions or concerns, please don't hesitate to call.     Sincerely,         Charmayne River, MD

## 2018-07-30 NOTE — PROGRESS NOTES
Past Surgical History:   Procedure Laterality Date    ABDOMINAL HERNIA REPAIR      BREAST BIOPSY Bilateral     BREAST SURGERY Bilateral 2008    nipple and milk gland removal    CARPAL TUNNEL RELEASE Bilateral      SECTION      CHOLECYSTECTOMY      FINGER TRIGGER RELEASE Bilateral     HYSTERECTOMY, TOTAL ABDOMINAL      KNEE ARTHROSCOPY Bilateral     LUMBAR SPINE SURGERY N/A 2017    L3-S1 DECOMPRESSION REMOVAL EPIDURAL MASS   performed by Mauricio Dunham MD at [address]    [address] Jose Varner    UPPER GASTROINTESTINAL ENDOSCOPY  3/24/15    [facility]     Family History   Problem Relation Age of Onset    Diabetes Mother     Heart Disease Mother     High Blood Pressure Mother     High Cholesterol Mother     Kidney Disease Mother     Arthritis Mother     Glaucoma Mother     Cataracts Mother     Seizures Mother     Heart Failure Mother     Cancer Father         lung    Diabetes Sister     High Blood Pressure Sister     Liver Disease Sister     Other Sister         flesh eating bacteria    Cancer Sister     High Blood Pressure Brother     Diabetes Brother     Heart Attack Brother     High Cholesterol Brother     Heart Disease Brother     COPD Brother     Heart Failure Brother     Other Brother         gout    Cancer Sister         leukemia    Other Brother         gout, PVD    Diabetes Brother     High Blood Pressure Brother     No Known Problems Son     No Known Problems Daughter      Social History     Social History    Marital status:      Spouse name: N/A    Number of children: N/A    Years of education: N/A     Social History Main Topics    Smoking status: Former Smoker     Packs/day: 0.50     Types: Cigarettes     Start date: 1997     Quit date: 2009    Smokeless tobacco: Never Used    Alcohol use No    Drug use: No    Sexual activity: Not Asked     Other Topics Concern    None     Social History Narrative    None     Current Outpatient Prescriptions   Medication Sig Dispense Refill    furosemide (LASIX) 40 MG tablet TAKE 1 TABLET BY MOUTH DAILY 90 tablet 1    metFORMIN (GLUCOPHAGE) 1000 MG tablet TAKE 1 TABLET BY MOUTH TWICE DAILY WITH MEALS 180 tablet 1    vitamin D (ERGOCALCIFEROL) 79114 units CAPS capsule TAKE 1 CAPSULE BY MOUTH 1 TIME A WEEK 12 capsule 1    carvedilol (COREG) 6.25 MG tablet TAKE 1 TABLET BY MOUTH TWICE DAILY 180 tablet 1    omeprazole (PRILOSEC) 40 MG delayed release capsule Take 1 capsule by mouth daily 90 capsule 1    insulin detemir (LEVEMIR FLEXTOUCH) 100 UNIT/ML injection pen Inject 40 Units into the skin nightly 40 mL 1    atorvastatin (LIPITOR) 40 MG tablet TAKE 1 TABLET BY MOUTH DAILY 90 tablet 1    glipiZIDE (GLUCOTROL XL) 5 MG extended release tablet Take 1 tablet by mouth daily 90 tablet 1    meloxicam (MOBIC) 15 MG tablet Take 1 tablet by mouth daily 90 tablet 1    gabapentin (NEURONTIN) 600 MG tablet Take 1 tablet by mouth 3 times daily for 30 days. . 90 tablet 0    tiZANidine (ZANAFLEX) 2 MG tablet Take 1 tablet by mouth 2 times daily as needed (pain spasms) 60 tablet 0    oxyCODONE-acetaminophen (PERCOCET) 5-325 MG per tablet Take 1 tablet by mouth 2 times daily as needed for Pain for up to 30 days. Luzmaria Delaney Date: 7/18/18 60 tablet 0    ONE TOUCH ULTRA TEST strip TEST THREE TIMES DAILY 300 strip 3    insulin lispro (HUMALOG) 100 UNIT/ML injection vial Inject 10 Units into the skin 3 times daily (before meals) 1 vial 3    butalbital-acetaminophen-caffeine (FIORICET, ESGIC) -40 MG per tablet Take 1 tablet by mouth 2 times daily as needed for Headaches      LINZESS 290 MCG CAPS capsule TAKE 1 CAPSULE BY MOUTH EVERY MORNING BEFORE BREAKFAST 30 capsule 5    spironolactone (ALDACTONE) 25 MG tablet Take 1 tablet by mouth daily 30 tablet 8    lidocaine (LIDODERM) 5 % Place 1 patch onto the skin daily 12 hours on, 12 hours off.       albuterol sulfate HFA (VENTOLIN HFA) 108 (90 Base) MCG/ACT inhaler Inhale 2 puffs into the lungs every 6 hours as needed for Wheezing      topiramate (TOPAMAX) 200 MG tablet Take 200 mg by mouth 2 times daily      ferrous sulfate 325 (65 Fe) MG tablet Take 325 mg by mouth daily (with breakfast)      potassium chloride (MICRO-K) 10 MEQ extended release capsule Take 20 mEq by mouth 2 times daily      fluticasone-salmeterol (ADVAIR HFA) 45-21 MCG/ACT inhaler Inhale 2 puffs into the lungs 2 times daily      acetaminophen (TYLENOL) 325 MG tablet Take 650 mg by mouth every 4 hours as needed for Pain       No current facility-administered medications for this visit.       Allergies   Allergen Reactions    Heparin Shortness Of Breath    Pcn [Penicillins] Shortness Of Breath       Review of Systems  Constitutional: negative for anorexia, fatigue, fevers, sweats and weight loss  Eyes: negative for color blindness, irritation, redness and visual disturbance  Ears, nose, mouth, throat, and face: negative for ear drainage, hearing loss, nasal congestion, sore mouth, tinnitus and voice change  Respiratory: negative for cough, dyspnea on exertion, shortness of breath and wheezing  Cardiovascular: negative for chest pain, dyspnea, lower extremity edema, orthopnea, palpitations, paroxysmal nocturnal dyspnea and tachypnea  Gastrointestinal: negative for abdominal pain, constipation, diarrhea, dyspepsia, dysphagia, nausea, odynophagia, reflux symptoms and vomiting  Genitourinary:negative for decreased stream, dysuria, frequency, hematuria, hesitancy and urinary incontinence  Integument/breast: negative for dryness, pruritus, rash and skin color change  Hematologic/lymphatic: negative for bleeding, easy bruising and petechiae  Musculoskeletal:negative for arthralgias, back pain, muscle weakness, myalgias, neck pain and stiff joints  Neurological: negative for coordination problems, dizziness, headaches, paresthesia, speech problems, tremors and vertigo fL Final    Platelets 68/61/4957 124* 155 - 404 10*3/uL Final    MPV 07/27/2018 12.0  9.9 - 12.1 fL Final    Nucleated RBCs 07/27/2018 0.0  /100[WBCs] Final    RBCs Counted 07/27/2018 0.00  10*3/uL Final    Neutrophils # 07/27/2018 3.94  1.95 - 6.85 10*3/uL Final    Lymphocytes # 07/27/2018 3.08* 0.40 - 2.84 10*3/uL Final    Monocytes # 07/27/2018 0.46  0.25 - 0.83 10*3/uL Final    Eosinophils # 07/27/2018 0.14  0.04 - 0.50 10*3/uL Final    Basophils # 07/27/2018 0.04  0.01 - 0.07 10*3/uL Final    Immature Granulocytes 07/27/2018 0.1  % Final    Immature Grans (Abs) 07/27/2018 0.01  0.00 - 0.21 10*3/uL Final    Neutrophils 07/27/2018 51.4  36.8 - 73.2 % Final    Lymphocytes 07/27/2018 40.2  15.7 - 50.5 % Final    Monocytes 07/27/2018 6.0  4.8 - 12.7 % Final    Eosinophils 07/27/2018 1.8  0.0 - 8.1 % Final    Basophils 07/27/2018 0.5  0.1 - 1.2 % Final    Glucose 07/27/2018 103* 70 - 100 mg/dL Final    Urea Nitrogen 07/27/2018 23  6 - 23 mg/dL Final    CREATININE 07/27/2018 0.81  0.50 - 1.05 mg/dL Final    Gfr Calculated 07/27/2018 >60   Final    Calcium 07/27/2018 8.6  8.6 - 10.3 mg/dL Final    Sodium 07/27/2018 141  136 - 145 mmol/L Final    Potassium 07/27/2018 3.6  3.5 - 5.1 mmol/L Final    Chloride 07/27/2018 111* 98 - 107 mmol/L Final    HCO3 07/27/2018 23  21 - 32 mmol/L Final    Albumin 07/27/2018 3.7  3.4 - 5.0 g/dL Final    Total Bilirubin 07/27/2018 0.2  0.0 - 1.2 mg/dL Final    Alkaline Phosphatase 07/27/2018 63  45 - 117 U/L Final    Total Protein 07/27/2018 7.5  6.4 - 8.2 g/dL Final    ALT 07/27/2018 15  7 - 45 U/L Final    AST 07/27/2018 16  13 - 39 U/L Final    Anion Gap 07/27/2018 11  10 - 20 mmol/L Final    Albumin/Globulin Ratio 07/27/2018 1.0  0.9 - 2.4 Final    BUN/Creatinine Ratio 07/27/2018 28* 5 - 25 Final    Cholesterol 07/27/2018 144  <200 mg/dL Final    Triglycerides 07/27/2018 108  <150 mg/dL Final    HDL 07/27/2018 43* mg/dL Final    LDL Calculated 07/27/2018 79  <130 mg/dL Final    VLDL Cholesterol Calculated 07/27/2018 22  <30 mg/dL Final    Chol/HDL Ratio 07/27/2018 3.3   Final    Ferritin 07/27/2018 5* 8 - 150 ng/mL Final    Folate 07/27/2018 5.40  ng/mL Final    Vitamin B-12 07/27/2018 281  211 - 911 pg/mL Final    Iron 07/27/2018 26* 35 - 150 ug/dL Final    TIBC 07/27/2018 318  250 - 565 ug/dL Final    UIBC 07/27/2018 292  90 - 340 ug/dL Final    Iron Saturation 07/27/2018 8* 14 - 27 % Final    Hemoglobin A1C 07/27/2018 6.1* 4.0 - 6.0 % Final        Assessment:     Encounter Diagnoses   Name Primary?  DM type 2 with diabetic peripheral neuropathy (HCC) Yes    Essential hypertension     Mixed hyperlipidemia     Iron deficiency anemia due to chronic blood loss     Thrombocytopenia (HCC)     Gastroesophageal reflux disease without esophagitis     Morbid obesity with BMI of 45.0-49.9, adult [facility])        Plan:      Outpatient Encounter Prescriptions as of 7/30/2018   Medication Sig Dispense Refill    furosemide (LASIX) 40 MG tablet TAKE 1 TABLET BY MOUTH DAILY 90 tablet 1    metFORMIN (GLUCOPHAGE) 1000 MG tablet TAKE 1 TABLET BY MOUTH TWICE DAILY WITH MEALS 180 tablet 1    vitamin D (ERGOCALCIFEROL) 66762 units CAPS capsule TAKE 1 CAPSULE BY MOUTH 1 TIME A WEEK 12 capsule 1    carvedilol (COREG) 6.25 MG tablet TAKE 1 TABLET BY MOUTH TWICE DAILY 180 tablet 1    omeprazole (PRILOSEC) 40 MG delayed release capsule Take 1 capsule by mouth daily 90 capsule 1    insulin detemir (LEVEMIR FLEXTOUCH) 100 UNIT/ML injection pen Inject 40 Units into the skin nightly 40 mL 1    atorvastatin (LIPITOR) 40 MG tablet TAKE 1 TABLET BY MOUTH DAILY 90 tablet 1    glipiZIDE (GLUCOTROL XL) 5 MG extended release tablet Take 1 tablet by mouth daily 90 tablet 1    meloxicam (MOBIC) 15 MG tablet Take 1 tablet by mouth daily 90 tablet 1    gabapentin (NEURONTIN) 600 MG tablet Take 1 tablet by mouth 3 times daily for 30 days. . 90 tablet 0    tiZANidine (ZANAFLEX) 2 MG tablet Take 1 tablet by mouth 2 times daily as needed (pain spasms) 60 tablet 0    oxyCODONE-acetaminophen (PERCOCET) 5-325 MG per tablet Take 1 tablet by mouth 2 times daily as needed for Pain for up to 30 days. Jayla Schuler Date: 7/18/18 60 tablet 0    ONE TOUCH ULTRA TEST strip TEST THREE TIMES DAILY 300 strip 3    insulin lispro (HUMALOG) 100 UNIT/ML injection vial Inject 10 Units into the skin 3 times daily (before meals) 1 vial 3    butalbital-acetaminophen-caffeine (FIORICET, ESGIC) -40 MG per tablet Take 1 tablet by mouth 2 times daily as needed for Headaches      LINZESS 290 MCG CAPS capsule TAKE 1 CAPSULE BY MOUTH EVERY MORNING BEFORE BREAKFAST 30 capsule 5    spironolactone (ALDACTONE) 25 MG tablet Take 1 tablet by mouth daily 30 tablet 8    lidocaine (LIDODERM) 5 % Place 1 patch onto the skin daily 12 hours on, 12 hours off.  albuterol sulfate HFA (VENTOLIN HFA) 108 (90 Base) MCG/ACT inhaler Inhale 2 puffs into the lungs every 6 hours as needed for Wheezing      topiramate (TOPAMAX) 200 MG tablet Take 200 mg by mouth 2 times daily      ferrous sulfate 325 (65 Fe) MG tablet Take 325 mg by mouth daily (with breakfast)      potassium chloride (MICRO-K) 10 MEQ extended release capsule Take 20 mEq by mouth 2 times daily      fluticasone-salmeterol (ADVAIR HFA) 45-21 MCG/ACT inhaler Inhale 2 puffs into the lungs 2 times daily      acetaminophen (TYLENOL) 325 MG tablet Take 650 mg by mouth every 4 hours as needed for Pain      [DISCONTINUED] furosemide (LASIX) 40 MG tablet Take 1 tablet by mouth daily 90 tablet 1    [DISCONTINUED] vitamin D (ERGOCALCIFEROL) 01646 units CAPS capsule Take 1 capsule by mouth once a week 4 capsule 5     No facility-administered encounter medications on file as of 7/30/2018.         Orders Placed This Encounter   Procedures    Comprehensive Metabolic Panel     Standing Status:   Future     Standing Expiration Date:   7/30/2019    Lipid

## 2018-08-01 ENCOUNTER — TELEPHONE (OUTPATIENT)
Dept: FAMILY MEDICINE CLINIC | Age: 48
End: 2018-08-01

## 2018-08-01 ENCOUNTER — OFFICE VISIT (OUTPATIENT)
Dept: PODIATRY | Facility: CLINIC | Age: 48
End: 2018-08-01

## 2018-08-01 DIAGNOSIS — Q72.899 BRACHYMETATARSIA: ICD-10-CM

## 2018-08-01 DIAGNOSIS — E11.42 DIABETIC POLYNEUROPATHY ASSOCIATED WITH TYPE 2 DIABETES MELLITUS (HCC): Primary | ICD-10-CM

## 2018-08-01 DIAGNOSIS — M20.41 HAMMER TOES, BILATERAL: ICD-10-CM

## 2018-08-01 DIAGNOSIS — M20.42 HAMMER TOES, BILATERAL: ICD-10-CM

## 2018-08-01 NOTE — PROGRESS NOTES
Subjective: The patient presents for dispensing of extra depth diabetic shoes and custom molded diabetic inserts. The patient has no new pedal complaints. A prefabricated AFO has been dispensed to the patient for the left foot and ankle, this was ordered and dispensed after the diabetic shoes were fitted.     Objective:   The diabetic inserts were compared to the corresponding foot and found to match the patient's foot type and shape. The inserts were applied into the shoes and found to be the appropriate size within the shoe. The shoes were applied to the patient's feet and the right was found to fit well. The left AFO in place, the foot was then able to be placed in the shoe at the same time as the insert. The left shoe was still too tight without the insert in place.     Assessment:   Diagnosis Orders   1. Diabetic polyneuropathy associated with type 2 diabetes mellitus ([address]     2. Hammer toes, bilateral     3. Brachymetatarsia          Plan: The wide width shoe is not compatible with the patient's left lower extremity AFO. I have recommended the patient obtain a Jonnathan Mcdonald style  Shoe in extra wide width to accommodate the AFO. The patient is agreeable, the shoes will be ordered. We will call the patient to schedule a visit for dispensing of the shoes once they arrive.

## 2018-08-01 NOTE — TELEPHONE ENCOUNTER
The patient can not be seen by Dr. Augustus Castillo until late September. She is requesting a referral to a different GI that may be able to see her sooner, as her stomach pains are persistant. Please advise. Thank you!

## 2018-08-02 ENCOUNTER — HOSPITAL ENCOUNTER (OUTPATIENT)
Dept: MRI IMAGING | Age: 48
Discharge: HOME | End: 2018-08-04
Payer: COMMERCIAL

## 2018-08-02 DIAGNOSIS — M48.062 SPINAL STENOSIS OF LUMBAR REGION WITH NEUROGENIC CLAUDICATION: ICD-10-CM

## 2018-08-02 DIAGNOSIS — M54.16 LUMBAR RADICULOPATHY: ICD-10-CM

## 2018-08-02 DIAGNOSIS — R53.1 WEAKNESS: ICD-10-CM

## 2018-08-02 DIAGNOSIS — G03.9 ADHESIVE ARACHNOIDITIS: ICD-10-CM

## 2018-08-02 DIAGNOSIS — D50.0 IRON DEFICIENCY ANEMIA DUE TO CHRONIC BLOOD LOSS: Primary | ICD-10-CM

## 2018-08-02 DIAGNOSIS — Z98.890 HISTORY OF LUMBOSACRAL SPINE SURGERY: ICD-10-CM

## 2018-08-02 PROCEDURE — 6360000004 HC RX CONTRAST MEDICATION: Performed by: NURSE PRACTITIONER

## 2018-08-02 PROCEDURE — A9579 GAD-BASE MR CONTRAST NOS,1ML: HCPCS | Performed by: NURSE PRACTITIONER

## 2018-08-02 PROCEDURE — 72158 MRI LUMBAR SPINE W/O & W/DYE: CPT

## 2018-08-02 RX ORDER — SODIUM CHLORIDE 0.9 % (FLUSH) 0.9 %
10 SYRINGE (ML) INJECTION 2 TIMES DAILY
Status: DISCONTINUED | OUTPATIENT
Start: 2018-08-02 | End: 2018-08-05 | Stop reason: HOSPADM

## 2018-08-02 RX ADMIN — GADOTERIDOL 20 ML: 279.3 INJECTION, SOLUTION INTRAVENOUS at 20:18

## 2018-08-07 ENCOUNTER — OFFICE VISIT (OUTPATIENT)
Dept: GASTROENTEROLOGY | Age: 48
End: 2018-08-07
Payer: COMMERCIAL

## 2018-08-07 VITALS
DIASTOLIC BLOOD PRESSURE: 62 MMHG | HEART RATE: 90 BPM | BODY MASS INDEX: 47.39 KG/M2 | WEIGHT: 251 LBS | SYSTOLIC BLOOD PRESSURE: 98 MMHG | TEMPERATURE: 97.7 F | OXYGEN SATURATION: 97 % | HEIGHT: 61 IN

## 2018-08-07 DIAGNOSIS — D50.8 OTHER IRON DEFICIENCY ANEMIA: Primary | ICD-10-CM

## 2018-08-07 DIAGNOSIS — R10.84 GENERALIZED ABDOMINAL PAIN: ICD-10-CM

## 2018-08-07 DIAGNOSIS — B19.10 HEPATITIS B INFECTION WITHOUT DELTA AGENT WITHOUT HEPATIC COMA, UNSPECIFIED CHRONICITY: ICD-10-CM

## 2018-08-07 DIAGNOSIS — D50.8 OTHER IRON DEFICIENCY ANEMIA: ICD-10-CM

## 2018-08-07 LAB
HBV SURFACE AB TITR SER: REACTIVE MIU/ML
HEPATITIS B CORE IGM ANTIBODY: NORMAL
HEPATITIS B SURFACE ANTIGEN INTERPRETATION: NORMAL
HEPATITIS C ANTIBODY INTERPRETATION: REACTIVE

## 2018-08-07 PROCEDURE — 99205 OFFICE O/P NEW HI 60 MIN: CPT | Performed by: INTERNAL MEDICINE

## 2018-08-07 RX ORDER — SODIUM, POTASSIUM,MAG SULFATES 17.5-3.13G
SOLUTION, RECONSTITUTED, ORAL ORAL
Qty: 1 BOTTLE | Refills: 0
Start: 2018-08-07 | End: 2018-10-22 | Stop reason: ALTCHOICE

## 2018-08-07 ASSESSMENT — ENCOUNTER SYMPTOMS
SHORTNESS OF BREATH: 0
EYE DISCHARGE: 0
BACK PAIN: 0
VOMITING: 0
EYE ITCHING: 0
DIARRHEA: 0
CONSTIPATION: 0
ABDOMINAL PAIN: 1
EYE PAIN: 0

## 2018-08-07 NOTE — PATIENT INSTRUCTIONS
Patient Education   Patient Education        Learning About Colonoscopy  What is a colonoscopy? A colonoscopy is a test (also called a procedure) that lets a doctor look inside your large intestine. The doctor uses a thin, lighted tube called a colonoscope. The doctor uses it to look for small growths called polyps, colon or rectal cancer (colorectal cancer), or other problems like bleeding. During the procedure, the doctor can take samples of tissue. The samples can then be checked for cancer or other conditions. The doctor can also take out polyps. How is colonoscopy done? This procedure is done in a doctor's office or a clinic or hospital. You will get medicine to help you relax and not feel pain. Some people find that they do not remember having the test because of the medicine. The doctor gently moves the colonoscope, or scope, through the colon. The scope is also a small video camera. It lets the doctor see the colon and take pictures. A colonoscopy usually takes 30 to 45 minutes. It may take longer if the doctor has to remove polyps. How do you prepare for the procedure? You need to clean out your colon before the procedure so the doctor can see all of your colon. You may start the cleaning process a day or two before the test. This depends on which \"colon prep\" your doctor recommends. To clean your colon, you stop eating solid foods and drink only clear liquids. You can have water, tea, coffee, clear juices, clear broths, flavored ice pops, and gelatin (such as Jell-O). Do not drink anything red or purple, such as grape juice or fruit punch. And do not eat red or purple foods, such as grape ice pops or cherry gelatin. The day or night before the procedure, you drink a large amount of a special liquid. This causes loose, frequent stools. You will go to the bathroom a lot. It is very important to drink all of the colon prep liquid. If you have problems drinking the liquid, call your doctor.   For many people, the prep is worse than the test. It may be uncomfortable, and you may feel hungry on the clear liquid diet. Some people do not go to work or do their usual activities on the day of the prep. Arrange to have someone take you home after the test.  What can you expect after a colonoscopy? The nurses will watch you for 1 to 2 hours until the medicines wear off. Then you can go home. You will need a ride. Your doctor will tell you when you can eat and do your usual activities. Your doctor will talk to you about when you will need your next colonoscopy. The results of your test and your risk for colorectal cancer will help your doctor decide how often you need to be checked. Follow-up care is a key part of your treatment and safety. Be sure to make and go to all appointments, and call your doctor if you are having problems. It's also a good idea to know your test results and keep a list of the medicines you take. Where can you learn more? Go to [email or web address] org and sign in to your [employer] account. Enter R581 in the LYSOGENE box to learn more about \"Learning About Colonoscopy. \"     If you do not have an account, please click on the \"Sign Up Now\" link. Current as of: May 12, 2017  Content Version: 11.6  © 7886-0858 [employer]. Care instructions adapted under license by [facility] ([facility]). If you have questions about a medical condition or this instruction, always ask your healthcare professional. [place] [address] any warranty or liability for your use of this information. Upper GI Endoscopy: Before Your Procedure  What is an upper GI endoscopy? An upper gastrointestinal (or GI) endoscopy is a test that allows your doctor to look at the inside of your esophagus, stomach, and the first part of your small intestine, called the duodenum. The esophagus is the tube that carries food to your stomach.  The doctor uses a thin, lighted tube

## 2018-08-07 NOTE — PROGRESS NOTES
Gastroenterology Outpatient Consultation     Mikayla Owens is a 51y.o. year old patient comes to our [address] [place] [address] for evaluation of anemia, and abdominal pain. History of Present Illness:    Anemia:    Previous history of anemia: since 2013, she is mentioning she needed blood transfusion at some point. Hemoglobin: 10 is the most recent one, 8/4/2018   MCV: 82.8  Iron studies: Low ferritin and low Iron  Rectal bleeding: she will see blood intermittently, when she wipes. Melena or dark stool: None  Hematemesis: None  Anticoagulation: None  Aspirin or NSAIDs use: None  Chronic kidney disease: None  Previous EGD: in 06/2017, was normal except for 2 cm hiatal hernia. Previous colonoscopy: None. GI symptoms: she will have abdominal pain, generalized, intermittent, comes everyday, mainly comes with food and drinking water, when it comes it will be severe, sometimes radiates to the sides, she denied any ( vomiting, diarrhea, constipation, weight loss ). Iron supplementation: None       She will have abdominal pain, generalized, intermittent, on daily now, mainly comes with any by mouth intake, when it comes it will be severe, sometimes radiates to the back, she will have nausea with that, but no vomiting. she denied any ( vomiting, diarrhea, constipation, weight loss )      I also noticed in the chart, the patient does have a previous history of hepatitis C, hepatitis B. Looks like she was treated and cured from hepatitis C prospective ( what patient is telling me today ), I asked her about the hepatitis B she said she never was placed previously on any medications for the hepatitis B.        Past Medical History:   Diagnosis Date    Abnormal cardiovascular stress test 4/20/2015    Anemia     Anxiety disorder     Arthritis     Asthma     Cerebral artery occlusion with cerebral infarction (HCC)     TIZ x 3    Chest pain 11/3/2014    Chronic hepatitis B (HCC) 1 TIME A WEEK 7/19/18  Yes Gretta Escobedo MD   carvedilol (COREG) 6.25 MG tablet TAKE 1 TABLET BY MOUTH TWICE DAILY 7/19/18  Yes Gretta Escobedo MD   omeprazole (PRILOSEC) 40 MG delayed release capsule Take 1 capsule by mouth daily 7/19/18  Yes Gretta Escobedo MD   insulin detemir (LEVEMIR FLEXTOUCH) 100 UNIT/ML injection pen Inject 40 Units into the skin nightly 7/19/18 10/17/18 Yes Gretta Escobedo MD   atorvastatin (LIPITOR) 40 MG tablet TAKE 1 TABLET BY MOUTH DAILY 7/19/18  Yes Gretta Escobedo MD   glipiZIDE (GLUCOTROL XL) 5 MG extended release tablet Take 1 tablet by mouth daily 7/19/18  Yes Gretta Escobedo MD   meloxicam LOGAN LAGUERRE [facility]) 15 MG tablet Take 1 tablet by mouth daily 7/19/18  Yes Gretta Escobedo MD   gabapentin (NEURONTIN) 600 MG tablet Take 1 tablet by mouth 3 times daily for 30 days. . 7/18/18 8/17/18 Yes Jocelyn Espino MD   tiZANidine (ZANAFLEX) 2 MG tablet Take 1 tablet by mouth 2 times daily as needed (pain spasms) 7/18/18 8/17/18 Yes Jocelyn Espino MD   oxyCODONE-acetaminophen (PERCOCET) 5-325 MG per tablet Take 1 tablet by mouth 2 times daily as needed for Pain for up to 30 days. Geo Cruz Date: 7/18/18 7/18/18 8/17/18 Yes Jocelyn Espino MD   ONE TOUCH ULTRA TEST strip TEST THREE TIMES DAILY 6/27/18  Yes Gretta Escobedo MD   insulin lispro (HUMALOG) 100 UNIT/ML injection vial Inject 10 Units into the skin 3 times daily (before meals) 6/26/18  Yes Gretta Escobedo MD   butalbital-acetaminophen-caffeine (FIORICET, ESGIC) -40 MG per tablet Take 1 tablet by mouth 2 times daily as needed for Headaches   Yes Historical ProviderMD GRAHAM 290 MCG CAPS capsule TAKE 1 CAPSULE BY MOUTH EVERY MORNING BEFORE BREAKFAST 1/7/18  Yes Gretta Escobedo MD   spironolactone (ALDACTONE) 25 MG tablet Take 1 tablet by mouth daily 12/8/17  Yes Gretta Escobedo MD   lidocaine (LIDODERM) 5 % Place 1 patch onto the skin daily 12 hours on, 12 hours off.    Yes Historical Provider, MD   albuterol sulfate HFA (VENTOLIN HFA) 108 (90 Base) MCG/ACT inhaler Inhale 2 puffs into the lungs every 6 hours as needed for Wheezing   Yes Historical Provider, MD   topiramate (TOPAMAX) 200 MG tablet Take 200 mg by mouth 2 times daily   Yes Historical Provider, MD   ferrous sulfate 325 (65 Fe) MG tablet Take 325 mg by mouth daily (with breakfast)   Yes Historical Provider, MD   potassium chloride (MICRO-K) 10 MEQ extended release capsule Take 20 mEq by mouth 2 times daily   Yes Historical Provider, MD   fluticasone-salmeterol (ADVAIR HFA) 45-21 MCG/ACT inhaler Inhale 2 puffs into the lungs 2 times daily   Yes Historical Provider, MD   acetaminophen (TYLENOL) 325 MG tablet Take 650 mg by mouth every 4 hours as needed for Pain   Yes Historical Provider, MD       Allergies:  Heparin and Pcn [penicillins]    Family History:  Family History   Problem Relation Age of Onset    Diabetes Mother     Heart Disease Mother     High Blood Pressure Mother     High Cholesterol Mother     Kidney Disease Mother     Arthritis Mother     Glaucoma Mother     Cataracts Mother     Seizures Mother     Heart Failure Mother     Cancer Father         lung    Diabetes Sister     High Blood Pressure Sister     Liver Disease Sister     Other Sister         flesh eating bacteria    Cancer Sister     High Blood Pressure Brother     Diabetes Brother     Heart Attack Brother     High Cholesterol Brother     Heart Disease Brother     COPD Brother     Heart Failure Brother     Other Brother         gout    Cancer Sister         leukemia    Other Brother         gout, PVD    Diabetes Brother     High Blood Pressure Brother     No Known Problems Son     No Known Problems Daughter        No family history of colon cancer, ulcerative colitis or Crohn's disease. Social History:  Social History     Social History    Marital status:      Spouse name: N/A    Number of children: N/A    Years of education: N/A     Occupational History    Not on file. Neurological: She is alert and oriented to person, place, and time. Skin: No rash noted. Labs:     Lab Results   Component Value Date    WBC 9.1 08/04/2018    HGB 10.0 (L) 08/04/2018    HCT 32.8 (L) 08/04/2018    MCV 82.8 (L) 08/04/2018     (L) 08/04/2018       Lab Results   Component Value Date    AST 16 08/04/2018    ALT 15 08/04/2018    BILIDIR 0.0 08/09/2017    LIPASE 44 08/04/2018    AMYLASE 43 08/04/2018       Lab Results   Component Value Date     08/04/2018    K 3.1 (L) 08/04/2018     (H) 08/04/2018    BUN 13 09/05/2017    CREATININE 0.85 08/04/2018    GLUCOSE 112 (H) 08/04/2018       Lab Results   Component Value Date    INR 1.11 (H) 08/04/2018       Lab Results   Component Value Date    IRON 26 (L) 07/27/2018    TIBC 318 07/27/2018    FERRITIN 5 (L) 07/27/2018       Lab Results   Component Value Date    TSH 1.11 04/14/2016    CALCIUM 9.1 08/04/2018         Patient's lab tests and radiology tests were reviewed. Previous available endoscopy and pathology results were reviewed. I reviewed old medical records. Assessment and Plan:    Love Ramon is a 51y.o. year old patient With multiple and complicated GI related problems, comes to our Gastroenterology clinic for further evaluation. Assessment:    1. Anemia   2. Abdominal pain  3. History of hepatitis C.   4.         History of hepatitis B. Plan:     Diagnosis Orders   1. Other iron deficiency anemia  EGD    Colonoscopy Routine    Hepatitis C antibody RNA quant    Hepatitis C Antibody    CT ABDOMEN PELVIS W WO CONTRAST Additional Contrast? Radiologist Recommendation    AFP Tumor Marker    Hepatitis B Core Antibody, IgM    Hepatitis B Core Antibody, Total    Hepatitis B Dna, Rt PCR, Quantitative    Hepatitis B E Antigen    Hepatitis B E Antibody    Hepatitis B Surface Antigen    Hepatitis B Surface Antibody   2.  Hepatitis B infection without delta agent without hepatic coma, unspecified

## 2018-08-09 LAB
AFP: 5.9 UG/L
HEPATITIS B CORE TOTAL ANTIBODY: POSITIVE

## 2018-08-10 ENCOUNTER — OFFICE VISIT (OUTPATIENT)
Dept: PODIATRY | Facility: CLINIC | Age: 48
End: 2018-08-10

## 2018-08-10 VITALS — BODY MASS INDEX: 47.01 KG/M2 | TEMPERATURE: 98.1 F | HEIGHT: 61 IN | WEIGHT: 249 LBS

## 2018-08-10 DIAGNOSIS — L60.3 NAIL DYSTROPHY: ICD-10-CM

## 2018-08-10 DIAGNOSIS — M20.42 HAMMER TOES, BILATERAL: ICD-10-CM

## 2018-08-10 DIAGNOSIS — E11.42 DIABETIC POLYNEUROPATHY ASSOCIATED WITH TYPE 2 DIABETES MELLITUS (HCC): ICD-10-CM

## 2018-08-10 DIAGNOSIS — M79.674 PAIN IN TOES OF BOTH FEET: Primary | ICD-10-CM

## 2018-08-10 DIAGNOSIS — M20.41 HAMMER TOES, BILATERAL: ICD-10-CM

## 2018-08-10 DIAGNOSIS — M79.675 PAIN IN TOES OF BOTH FEET: Primary | ICD-10-CM

## 2018-08-10 LAB
EER HCV RNA QNT PCR: NORMAL
HCV RNA QNT REAL-TIME PCR INTERP: NOT DETECTED
HCV RNA, QUANTITATIVE REAL TIME PCR: <1.2 LOG IU
HEPATITIS BE ANTIBODY: POSITIVE
HEPATITIS BE ANTIGEN: NEGATIVE
HEPATITIS C RNA PCR QUANT: <15 IU/ML

## 2018-08-10 ASSESSMENT — ENCOUNTER SYMPTOMS
SHORTNESS OF BREATH: 1
CONSTIPATION: 0
VOMITING: 0
NAUSEA: 0
BACK PAIN: 1
DIARRHEA: 0
SHORTNESS OF BREATH: 0

## 2018-08-10 NOTE — PROGRESS NOTES
 Chronic hepatitis B ([facility] 2013    [facility]    Chronic low back pain     Cirrhosis (HCC)     COPD (chronic obstructive pulmonary disease) ([facility]     Dizziness 2015    DM type 2 with diabetic peripheral neuropathy ([facility] 2016    DM type 2 with diabetic peripheral neuropathy (HCC) 2016    Epidural lipomatosis     GERD (gastroesophageal reflux disease)     Gout     Headache 2015    Headache 2015    Hepatitis C     History of blood transfusion ?    no reaction     History of peptic ulcer disease     History of seizures     History of TIA (transient ischemic attack)     Hyperlipidemia     Hypertension     Lumbar stenosis     Obesity     Osteoarthritis of spine with radiculopathy, thoracolumbar region     Psoriasis     SOB (shortness of breath) 2015    Type II or unspecified type diabetes mellitus without mention of complication, not stated as uncontrolled      Past Surgical History:   Procedure Laterality Date    ABDOMINAL HERNIA REPAIR      BREAST BIOPSY Bilateral     BREAST SURGERY Bilateral 2008    nipple and milk gland removal    CARPAL TUNNEL RELEASE Bilateral      SECTION      CHOLECYSTECTOMY      FINGER TRIGGER RELEASE Bilateral     HYSTERECTOMY, TOTAL ABDOMINAL      KNEE ARTHROSCOPY Bilateral     LUMBAR SPINE SURGERY N/A 2017    L3-S1 DECOMPRESSION REMOVAL EPIDURAL MASS   performed by Cira Bettencourt MD at [facility]  14    DR. FRYE    UPPER GASTROINTESTINAL ENDOSCOPY  3/24/15    [facility]     Social History     Social History    Marital status:      Spouse name: N/A    Number of children: N/A    Years of education: N/A     Occupational History    Not on file.      Social History Main Topics    Smoking status: Former Smoker     Packs/day: 0.50     Types: Cigarettes     Start date: 1997     Quit date: 2009    Smokeless tobacco: sensation is *** bilaterally. Sharp/dull sensation is *** bilaterally. Patellar deep tendon reflex is graded at ***/4 bilaterally. Achilles tendon deep tendon reflex is graded at ***/4 bilaterally. The Babinski response is *** bilaterally. No ankle clonus is noted bilaterally. Dermatological:  No open lesions noted bilateral foot. Nails are within normal limits and are well groomed. Normal texture and turgor noted bilaterally. Webspaces are intact bilateral foot. Musculoskeletal:  Rectus foot type noted bilaterally. Manual muscle strength is graded at ***/5 for all groups tested bilateral foot. No gross static deformities noted. No pain or crepitus noted with active or passive range of motion of the joints of the foot or ankle bilaterally. Psychiatric:    Judgement and insight intact. Short and long term memory intact. No evidence of depression, anxiety, or agitation. Patient is calm, cooperative, and communicative. Appropriate interactions and affect. Assessment:     {No diagnosis found. (Refresh or delete this SmartLink)}    Plan:       No orders of the defined types were placed in this encounter. No orders of the defined types were placed in this encounter. Follow up:  No Follow-up on file. Mark Shay DPM      Please note that this report has been partially produced using speech recognition software which may cause errors including grammar, punctuation, and spelling or words and phrases that may seem inappropriate. If there are questions or concerns please feel free to contact me for clarification.

## 2018-08-10 NOTE — PROGRESS NOTES
Allison Panchomars  (1970)    8/10/18    Subjective     Rosann Bumpers is 51 y.o. female who complains today of:    Chief Complaint   Patient presents with    Diabetes    Foot Pain     Bilateral       HPI:  The patient presents  for evaluation and treatment of painful digital nail deformities. The patient has been unable to provide self nail care due to the severe nature of deformity which is causing pressure and limiting their ability to walk in shoe gear without pain. Self debridement has been attempted with no success. This is a chronic problem. The patient is diabetic. The patient also presents for dispensing of diabetic shoes and inserts. Review of Systems   Constitutional: Negative for chills and fever. Respiratory: Negative for shortness of breath. Cardiovascular: Negative for chest pain. Gastrointestinal: Negative for nausea and vomiting. Musculoskeletal: Positive for arthralgias and back pain. Skin: Negative for wound. The patient is a diabetic.    PCP: Dr. Leona Mcdonald   Date last seen: 7/30/2018    Allergies:  Heparin and Pcn [penicillins]    Current Outpatient Prescriptions on File Prior to Visit   Medication Sig Dispense Refill    Na Sulfate-K Sulfate-Mg Sulf 17.5-3.13-1.6 GM/180ML SOLN As directed 1 Bottle 0    furosemide (LASIX) 40 MG tablet TAKE 1 TABLET BY MOUTH DAILY 90 tablet 1    metFORMIN (GLUCOPHAGE) 1000 MG tablet TAKE 1 TABLET BY MOUTH TWICE DAILY WITH MEALS 180 tablet 1    vitamin D (ERGOCALCIFEROL) 41719 units CAPS capsule TAKE 1 CAPSULE BY MOUTH 1 TIME A WEEK 12 capsule 1    carvedilol (COREG) 6.25 MG tablet TAKE 1 TABLET BY MOUTH TWICE DAILY 180 tablet 1    omeprazole (PRILOSEC) 40 MG delayed release capsule Take 1 capsule by mouth daily 90 capsule 1    insulin detemir (LEVEMIR FLEXTOUCH) 100 UNIT/ML injection pen Inject 40 Units into the skin nightly 40 mL 1    atorvastatin (LIPITOR) 40 MG tablet TAKE 1 TABLET BY MOUTH DAILY 90 tablet 1    glipiZIDE Anemia     Anxiety disorder     Arthritis     Asthma     [facility]bral artery occlusion [facility]     TIZ x 3    Chest pain 11/3/2014    Chronic hepatitis B ([address] 2013    Chronic hepatitis C without hepatic coma ([facility] 2013    Chronic low back pain     Cirrhosis (HCC)     COPD (chronic obstructive pulmonary disease) ([facility]     Dizziness 2015    DM type 2 with diabetic peripheral neuropathy ([facility] 2016    DM type 2 with diabetic peripheral neuropathy (HCC) 2016    Epidural lipomatosis     GERD (gastroesophageal reflux disease)     Gout     Headache 2015    Headache 2015    Hepatitis C     History of blood transfusion ?    no reaction     History of peptic ulcer disease     History of seizures     History of TIA (transient ischemic attack)     Hyperlipidemia     Hypertension     Lumbar stenosis     Obesity     Osteoarthritis of spine with radiculopathy, thoracolumbar region     Psoriasis     SOB (shortness of breath) 2015    Type II or unspecified type diabetes mellitus without mention of complication, not stated as uncontrolled      Past Surgical History:   Procedure Laterality Date    ABDOMINAL HERNIA REPAIR      BREAST BIOPSY Bilateral     BREAST SURGERY Bilateral 2008    nipple and milk gland removal    CARPAL TUNNEL RELEASE Bilateral      SECTION      CHOLECYSTECTOMY      FINGER TRIGGER RELEASE Bilateral     HYSTERECTOMY, TOTAL ABDOMINAL      KNEE ARTHROSCOPY Bilateral     LUMBAR SPINE SURGERY N/A 2017    L3-S1 DECOMPRESSION REMOVAL EPIDURAL MASS   performed by Satinder Carty MD at [place] [address]  14    DR. FRYE    UPPER GASTROINTESTINAL ENDOSCOPY  3/24/15    [facility]     Social History     Social History    Marital status:      Spouse name: N/A    Number of children: N/A    Years of education: N/A     Occupational History    Not on file.      Social History Main Topics    Smoking status: Former Smoker     Packs/day: 0.50     Types: Cigarettes     Start date: 1/1/1997     Quit date: 1/1/2009    Smokeless tobacco: Never Used    Alcohol use No    Drug use: No    Sexual activity: Not on file     Other Topics Concern    Not on file     Social History Narrative    No narrative on file     Family History   Problem Relation Age of Onset    Diabetes Mother     Heart Disease Mother     High Blood Pressure Mother     High Cholesterol Mother     Kidney Disease Mother     Arthritis Mother     Glaucoma Mother     Cataracts Mother     Seizures Mother     Heart Failure Mother     Cancer Father         lung    Diabetes Sister     High Blood Pressure Sister     Liver Disease Sister     Other Sister         flesh eating bacteria    Cancer Sister     High Blood Pressure Brother     Diabetes Brother     Heart Attack Brother     High Cholesterol Brother     Heart Disease Brother     COPD Brother     Heart Failure Brother     Other Brother         gout    Cancer Sister         leukemia    Other Brother         gout, PVD    Diabetes Brother     High Blood Pressure Brother     No Known Problems Son     No Known Problems Daughter            Objective:   Vitals:  Temp 98.1 °F (36.7 °C) (Tympanic)   Ht 5' 1\" (1.549 m)   Wt 249 lb (112.9 kg)   LMP  (LMP Unknown)   BMI 47.05 kg/m² Pain Score:   6    Physical Exam    Vascular:   Dorsalis pedis pulse is graded at 2/4 bilateral foot. Posterior tibial pulse is graded at 1/4 bilateral foot. There is 1+ nonpitting edema noted to the bilateral lower extremity. Capillary refill is immediate bilateral hallux. There are no varicosities noted bilaterally. There are no telangiectasia noted bilaterally. Skin temperature gradient is noted to be warm to warm bilaterally. There are no signs of ischemia or skin atrophy noted bilaterally.   Hair growth is absence bilaterally.     Neurological:   Protective sensation is

## 2018-08-11 LAB
HBV DNA QNT I/U: <20 IU/ML
HEPATITIS B DNA, PCR (QUANT): NOT DETECTED
LOG 10 HBV AS IU/ML: <1.3 LOG IU

## 2018-08-16 ENCOUNTER — OUTSIDE SERVICES (OUTPATIENT)
Dept: GASTROENTEROLOGY | Age: 48
End: 2018-08-16
Payer: COMMERCIAL

## 2018-08-16 DIAGNOSIS — K29.50 MILD CHRONIC GASTRITIS: Primary | ICD-10-CM

## 2018-08-16 DIAGNOSIS — D50.8 OTHER IRON DEFICIENCY ANEMIA: ICD-10-CM

## 2018-08-16 DIAGNOSIS — R10.84 GENERALIZED ABDOMINAL PAIN: ICD-10-CM

## 2018-08-16 DIAGNOSIS — B19.10 HEPATITIS B INFECTION WITHOUT DELTA AGENT WITHOUT HEPATIC COMA, UNSPECIFIED CHRONICITY: Primary | ICD-10-CM

## 2018-08-16 DIAGNOSIS — K63.5 POLYP OF SIGMOID COLON, UNSPECIFIED TYPE: ICD-10-CM

## 2018-08-16 DIAGNOSIS — B18.1 CHRONIC VIRAL HEPATITIS B WITHOUT DELTA AGENT AND WITHOUT COMA (HCC): Primary | ICD-10-CM

## 2018-08-16 PROCEDURE — 43239 EGD BIOPSY SINGLE/MULTIPLE: CPT | Performed by: INTERNAL MEDICINE

## 2018-08-16 PROCEDURE — 45385 COLONOSCOPY W/LESION REMOVAL: CPT | Performed by: INTERNAL MEDICINE

## 2018-08-16 NOTE — OP NOTE
Endoscopy Note    Patient: Simran Valverde  : 1970    Procedure: Esophagogastroduodenoscopy    Date:  2018     Staff:  Vicenta Cabrera MD    Preoperative Diagnosis:  Chronic Anemia, and chronic abdominal pain    Postoperative Diagnosis:  Mild gastritis. Anesthesia: Per anesthesia staff. Indications: This is a 51y.o. year old female who presents for upper endoscopy with Chronic anemia, abdominal pain. CONSENT:  The indications, alternatives, and potential complications of endoscopy were discussed with the patient and/or representatives(s). Potential complications of bleeding, perforation, infection, and complications of sedation were discussed. The need for surgery, blood transfusions, hospital admission, if a complication occurs, was explained to the patient. Informed consent was obtained from the patient after providing the opportunity for questions. PREP: None    Description of Procedure:    A time out was performed. The patient correctly identified his name, date of birth, and the procedure being performed. The patient received oxygen via nasal cannula, continuous pulse oximetry monitoring, and intermittent blood pressure monitoring. Patient placed in the left lateral decubitus position and the above IV sedation was administrered. The Olympus videoendoscope was placed in the patient's mouth and under direct visualization passed into the esophagus. Visualization of the esophagus demonstrated normal mucosa. Gastroesophageal junction was noted at 35 cm, and appeared normal.     The scope was then advanced into the stomach. Visualization of the gastric body and antrum demonstrated mild gastritis. A retroflexed exam of the gastric cardia and fundus demonstrated mild gastritis. The pylorus was patent and the scope was advanced into the duodenum. Visualization of the duodenal bulb demonstrated normal mucosa.   The second portion of the duodenum demonstrated normal

## 2018-08-20 ENCOUNTER — HOSPITAL ENCOUNTER (OUTPATIENT)
Dept: CT IMAGING | Age: 48
Discharge: HOME | End: 2018-08-22
Payer: COMMERCIAL

## 2018-08-20 VITALS — HEART RATE: 89 BPM | DIASTOLIC BLOOD PRESSURE: 70 MMHG | SYSTOLIC BLOOD PRESSURE: 102 MMHG

## 2018-08-20 DIAGNOSIS — D50.8 OTHER IRON DEFICIENCY ANEMIA: ICD-10-CM

## 2018-08-20 DIAGNOSIS — R10.84 GENERALIZED ABDOMINAL PAIN: ICD-10-CM

## 2018-08-20 DIAGNOSIS — B19.10 HEPATITIS B INFECTION WITHOUT DELTA AGENT WITHOUT HEPATIC COMA, UNSPECIFIED CHRONICITY: ICD-10-CM

## 2018-08-20 PROCEDURE — 2500000003 HC RX 250 WO HCPCS: Performed by: INTERNAL MEDICINE

## 2018-08-20 PROCEDURE — 74177 CT ABD & PELVIS W/CONTRAST: CPT

## 2018-08-20 PROCEDURE — 6360000004 HC RX CONTRAST MEDICATION: Performed by: INTERNAL MEDICINE

## 2018-08-20 RX ADMIN — BARIUM SULFATE 450 ML: 20 SUSPENSION ORAL at 13:25

## 2018-08-20 RX ADMIN — IOPAMIDOL 100 ML: 612 INJECTION, SOLUTION INTRAVENOUS at 14:17

## 2018-08-23 RX ORDER — GLIPIZIDE 5 MG/1
TABLET ORAL
Qty: 60 TABLET | Refills: 0 | OUTPATIENT
Start: 2018-08-23

## 2018-08-26 DIAGNOSIS — F51.04 CHRONIC INSOMNIA: ICD-10-CM

## 2018-08-27 RX ORDER — ZOLPIDEM TARTRATE 10 MG/1
TABLET ORAL
Qty: 20 TABLET | Refills: 5 | Status: SHIPPED | OUTPATIENT
Start: 2018-08-27 | End: 2018-12-22 | Stop reason: SDUPTHER

## 2018-08-29 RX ORDER — MELOXICAM 15 MG/1
15 TABLET ORAL DAILY
Qty: 30 TABLET | Refills: 0 | OUTPATIENT
Start: 2018-08-29

## 2018-08-29 NOTE — TELEPHONE ENCOUNTER
Pharmacy requesting refill.  Last OV 7/30/18  rx refused, rx sent in 7/19/18 for 90 with refill    Future Appointments  Date Time Provider Denisa Quan   9/4/2018 11:15 AM Burnett Cooks, MD [address]   9/10/2018 10:30 AM Abdiel Tobias MD [address] [place]   9/10/2018 2:30 PM Linnette Pinto MD [facility]   10/29/2018 3:00 PM Leandra Jaimes MD [address] [facility]

## 2018-09-04 RX ORDER — GLIPIZIDE 5 MG/1
5 TABLET, FILM COATED, EXTENDED RELEASE ORAL DAILY
Qty: 30 TABLET | Refills: 0 | OUTPATIENT
Start: 2018-09-04

## 2018-09-07 RX ORDER — MELOXICAM 15 MG/1
15 TABLET ORAL DAILY
Qty: 90 TABLET | Refills: 1 | Status: SHIPPED | OUTPATIENT
Start: 2018-09-07

## 2018-09-07 RX ORDER — ATORVASTATIN CALCIUM 40 MG/1
TABLET, FILM COATED ORAL
Qty: 90 TABLET | Refills: 1 | Status: SHIPPED | OUTPATIENT
Start: 2018-09-07 | End: 2019-04-01 | Stop reason: SDUPTHER

## 2018-09-07 RX ORDER — OMEPRAZOLE 40 MG/1
40 CAPSULE, DELAYED RELEASE ORAL DAILY
Qty: 90 CAPSULE | Refills: 1 | Status: SHIPPED | OUTPATIENT
Start: 2018-09-07

## 2018-09-07 RX ORDER — ERGOCALCIFEROL 1.25 MG/1
CAPSULE ORAL
Qty: 12 CAPSULE | Refills: 1 | Status: SHIPPED | OUTPATIENT
Start: 2018-09-07 | End: 2019-02-25 | Stop reason: SDUPTHER

## 2018-09-07 RX ORDER — CARVEDILOL 6.25 MG/1
TABLET ORAL
Qty: 180 TABLET | Refills: 1 | Status: SHIPPED | OUTPATIENT
Start: 2018-09-07 | End: 2018-11-19 | Stop reason: DRUGHIGH

## 2018-09-07 RX ORDER — GLIPIZIDE 5 MG/1
5 TABLET, FILM COATED, EXTENDED RELEASE ORAL DAILY
Qty: 90 TABLET | Refills: 1 | Status: SHIPPED | OUTPATIENT
Start: 2018-09-07 | End: 2019-04-01 | Stop reason: SDUPTHER

## 2018-09-07 NOTE — TELEPHONE ENCOUNTER
Patient called in checking on refill request for her Glipizide and I advised patient refill request was too soon due to this medication was sent to her mail order 90 day supply with 1 refill. Patient said that she cancelled her mail order and she does not want her medications sent there and she never received the script from 7/19/18. Patient wants her glipizide sent to local pharmacy with a 90 day supply. Please advise.

## 2018-09-10 ENCOUNTER — OFFICE VISIT (OUTPATIENT)
Dept: INFECTIOUS DISEASES | Age: 48
End: 2018-09-10
Payer: COMMERCIAL

## 2018-09-10 VITALS
RESPIRATION RATE: 20 BRPM | HEIGHT: 61 IN | TEMPERATURE: 97.8 F | HEART RATE: 85 BPM | WEIGHT: 249.2 LBS | SYSTOLIC BLOOD PRESSURE: 124 MMHG | DIASTOLIC BLOOD PRESSURE: 85 MMHG | BODY MASS INDEX: 47.05 KG/M2

## 2018-09-10 DIAGNOSIS — B16.9 ACUTE VIRAL HEPATITIS B WITHOUT COMA AND WITHOUT DELTA AGENT: ICD-10-CM

## 2018-09-10 DIAGNOSIS — B18.2 CHRONIC HEPATITIS C WITHOUT HEPATIC COMA (HCC): Primary | ICD-10-CM

## 2018-09-10 PROCEDURE — 1036F TOBACCO NON-USER: CPT | Performed by: INTERNAL MEDICINE

## 2018-09-10 PROCEDURE — 99204 OFFICE O/P NEW MOD 45 MIN: CPT | Performed by: INTERNAL MEDICINE

## 2018-09-10 PROCEDURE — G8427 DOCREV CUR MEDS BY ELIG CLIN: HCPCS | Performed by: INTERNAL MEDICINE

## 2018-09-10 PROCEDURE — G8417 CALC BMI ABV UP PARAM F/U: HCPCS | Performed by: INTERNAL MEDICINE

## 2018-09-10 ASSESSMENT — ENCOUNTER SYMPTOMS
NAUSEA: 1
BACK PAIN: 1
DIARRHEA: 1

## 2018-09-10 NOTE — PROGRESS NOTES
Subjective:      Patient ID: Alli Crawley is a 51 y.o. female. HPI  Referred by Dr Nathalie Lopes for Hep B infection. I treated patient for [facility] with Eugenio Sampson and had SVR. Dr Jorge Carlson for Chronic active hepatitis C with lack of response many years ago with Pegasys and Ribavirin. X ETOH  use, drug and ETOH free for 20 years. Had a blood transfusion in 1986. Review of Systems   Constitutional: Positive for fatigue. Gastrointestinal: Positive for diarrhea (S/P colonoscopy by Dr Nathalie Lopes) and nausea. Musculoskeletal: Positive for arthralgias, back pain (uses a cane since back surgery) and gait problem. All other systems reviewed and are negative. Objective:   Physical Exam   Constitutional: She is oriented to person, place, and time. No distress. HENT:   Mouth/Throat: No oropharyngeal exudate. Eyes: No scleral icterus. Neck: Neck supple. No JVD present. No thyromegaly present. Cardiovascular: Normal rate, regular rhythm and normal heart sounds. No murmur heard. Pulmonary/Chest: Effort normal and breath sounds normal. No respiratory distress. She has no wheezes. She has no rales. Abdominal: Soft. Bowel sounds are normal. She exhibits no distension (obese) and no mass. There is no tenderness. There is no rebound. Musculoskeletal: She exhibits edema. Lymphadenopathy:     She has no cervical adenopathy. Neurological: She is alert and oriented to person, place, and time. No cranial nerve deficit. She exhibits normal muscle tone. Coordination normal.   Skin: No rash noted. No erythema. Psychiatric: She has a normal mood and affect. Her behavior is normal.   Nursing note and vitals reviewed.     8/9/2018 11:59 AM - Bear, [facility] Incoming Lab Results From Soft   Component Results   Component Value Ref Range & Units Status Collected Lab   AFP (Alpha Fetoprotein) 5.9  <8.4 ug/L Final       8/11/2018 12:47 PM - Bear, [facility]po Incoming Lab Results From Soft   Component Results   Component Value Ref Range & Units Status Collected Lab   HBV DNA Qnt I/U <20  IU/mL Final 08/07/2018 2:44 PM [facility]   HEPATITIS B DNA, PCR (QUANT) Not Detected  Not Detected Final 08/07/2018  2:44 PM      8/10/2018  1:27 AM - Bear, Chpo Incoming Lab Results From Soft   Component Results   Component Value Ref Range & Units Status Collected Lab   Hep B E Ag Negative  Negative Final 08/07/2018 2:44 PM [facility]   [facility] [id number]     8/10/2018  1:27 AM - Bear, [facility] Incoming Lab Results From Soft   Component Results   Component Value Ref Range & Units Status Collected Lab   Hep B E Ag Negative  Negative Final 08/07/2018 2:44 PM [facility]   Performed by Orb Rashad Hsu [address] [place] [id number]     8/10/2018  4:25 PM - Bear, [facility] Incoming Lab Results From Soft   Component Results   Component Value Ref Range & Units Status Collected Lab   Hep B E Ab Positive   Negative Final 08/07/2018  2:44 PM [facility]   The anti-HBe is repeatedly      8/7/2018  9:07 PM - Bear, [facility] Incoming Lab Results From Soft   Component Results   Component Collected Lab   Hep B S Ag Interp 08/07/2018  2:44 PM [address] [facility]   Non-reactive      8/7/2018  9:07 PM - Bear, Chpo Incoming Lab Results From Soft   Component Results   Component Value Ref Range & Units Status Collected Lab   Hep B S Ab REACTIVE  mIU/mL Final 08/07/2018 2:      8/10/2018  3:54 PM - Bear, Chpo Incoming Lab Results From Soft   Component Results   Component Value Ref Range & Units Status Collected Lab   HCV RNA, Quantitative Real Time PCR <1.2  log IU Final 08/07/2018  2:44       NO ACUTE PATHOLOGY IN THE ABDOMEN OR PELVIS.               Assessment:      [facility], cured  Hep B infection  ?  Cirrhosis  F2 by Romero Ascencio if she had Fibroscan or if she saw Dr Jorge Sheehan when she was referred      Plan:      Obtain Fibroscan results and Dr Jorge Sheehan  Liver U/S, CBC, CMP, AFP, HBsAg and VL in 6 months  25 minutes were spent reviewing chart and discussing case Aung Reynoso MD

## 2018-10-15 RX ORDER — LIDOCAINE 50 MG/G
1 PATCH TOPICAL DAILY
Qty: 30 PATCH | Refills: 1 | OUTPATIENT
Start: 2018-10-15

## 2018-10-20 LAB
A/G RATIO: 1.1 (ref 0.9–2.4)
AFP-TUMOR MARKER: 5 NG/ML (ref 0–9)
ALBUMIN: 4.1 G/DL (ref 3.4–5)
ALP BLD-CCNC: 63 U/L (ref 45–117)
ALT SERPL-CCNC: 13 U/L (ref 7–45)
ANION GAP SERPL CALCULATED.3IONS-SCNC: 12 MMOL/L (ref 10–20)
AST SERPL-CCNC: 15 U/L (ref 13–39)
BICARBONATE: 22 MMOL/L (ref 21–32)
BILIRUB SERPL-MCNC: 0.3 MG/DL (ref 0–1.2)
BUN / CREAT RATIO: 20 (ref 5–25)
CALCIUM SERPL-MCNC: 9 MG/DL (ref 8.6–10.3)
CHLORIDE BLD-SCNC: 109 MMOL/L (ref 98–107)
CREAT SERPL-MCNC: 0.81 MG/DL (ref 0.5–1.05)
ERYTHROCYTE [DISTWIDTH] IN BLOOD BY AUTOMATED COUNT: 18.4 % (ref 12–15.4)
ERYTHROCYTE [DISTWIDTH] IN BLOOD BY AUTOMATED COUNT: 56.2 FL (ref 39.3–48.6)
GFR CALCULATED: >60
GLUCOSE: 113 MG/DL (ref 70–100)
HCT VFR BLD CALC: 34.9 % (ref 36.5–46.6)
HEMOGLOBIN: 10.2 G/DL (ref 11.8–15.3)
HEPATITIS B SURFACE ANTIGEN: NONREACTIVE
MCH RBC QN AUTO: 24.6 PG (ref 27.5–33)
MCHC RBC AUTO-ENTMCNC: 29.2 G/DL (ref 30.1–35)
MCV RBC AUTO: 84.1 FL (ref 85.4–100)
NUCLEATED RBCS: 0 /100{WBCS}
PLATELET # BLD: 135 10*3/UL (ref 155–404)
PMV BLD AUTO: 13.5 FL (ref 9.9–12.1)
POTASSIUM SERPL-SCNC: 3.4 MMOL/L (ref 3.5–5.1)
RBC: 4.15 10*6/UL (ref 3.85–5.1)
RBCS COUNTED: 0 10*3/UL
SODIUM BLD-SCNC: 140 MMOL/L (ref 136–145)
TOTAL PROTEIN: 8 G/DL (ref 6.4–8.2)
UREA NITROGEN: 16 MG/DL (ref 6–23)
WBC: 7.5 10*3/UL (ref 4.4–9.9)

## 2018-10-22 ENCOUNTER — OFFICE VISIT (OUTPATIENT)
Dept: FAMILY MEDICINE CLINIC | Age: 48
End: 2018-10-22
Payer: COMMERCIAL

## 2018-10-22 VITALS
BODY MASS INDEX: 46.52 KG/M2 | DIASTOLIC BLOOD PRESSURE: 64 MMHG | RESPIRATION RATE: 16 BRPM | WEIGHT: 246.4 LBS | HEIGHT: 61 IN | TEMPERATURE: 97.7 F | SYSTOLIC BLOOD PRESSURE: 110 MMHG | HEART RATE: 72 BPM

## 2018-10-22 DIAGNOSIS — Z23 NEED FOR INFLUENZA VACCINATION: ICD-10-CM

## 2018-10-22 DIAGNOSIS — I10 ESSENTIAL HYPERTENSION: Primary | ICD-10-CM

## 2018-10-22 PROBLEM — M96.1 POSTLAMINECTOMY SYNDROME OF LUMBAR REGION: Status: ACTIVE | Noted: 2018-10-22

## 2018-10-22 LAB — Lab: NORMAL

## 2018-10-22 PROCEDURE — 90471 IMMUNIZATION ADMIN: CPT | Performed by: FAMILY MEDICINE

## 2018-10-22 PROCEDURE — 99213 OFFICE O/P EST LOW 20 MIN: CPT | Performed by: FAMILY MEDICINE

## 2018-10-22 PROCEDURE — G8427 DOCREV CUR MEDS BY ELIG CLIN: HCPCS | Performed by: FAMILY MEDICINE

## 2018-10-22 PROCEDURE — G8482 FLU IMMUNIZE ORDER/ADMIN: HCPCS | Performed by: FAMILY MEDICINE

## 2018-10-22 PROCEDURE — 1036F TOBACCO NON-USER: CPT | Performed by: FAMILY MEDICINE

## 2018-10-22 PROCEDURE — 90688 IIV4 VACCINE SPLT 0.5 ML IM: CPT | Performed by: FAMILY MEDICINE

## 2018-10-22 PROCEDURE — G8417 CALC BMI ABV UP PARAM F/U: HCPCS | Performed by: FAMILY MEDICINE

## 2018-10-22 RX ORDER — POTASSIUM CHLORIDE 20 MEQ/1
TABLET, EXTENDED RELEASE ORAL
Qty: 180 TABLET | Refills: 0 | Status: SHIPPED | OUTPATIENT
Start: 2018-10-22 | End: 2019-01-27 | Stop reason: SDUPTHER

## 2018-10-22 RX ORDER — LANOLIN ALCOHOL/MO/W.PET/CERES
1000 CREAM (GRAM) TOPICAL DAILY
COMMUNITY

## 2018-10-22 RX ORDER — SPIRONOLACTONE 25 MG/1
25 TABLET ORAL DAILY
Qty: 30 TABLET | Refills: 8 | Status: CANCELLED | OUTPATIENT
Start: 2018-10-22

## 2018-10-22 RX ORDER — POTASSIUM CHLORIDE 20 MEQ/1
20 TABLET, EXTENDED RELEASE ORAL 2 TIMES DAILY
Qty: 90 TABLET | Refills: 1 | Status: SHIPPED | OUTPATIENT
Start: 2018-10-22 | End: 2018-10-22 | Stop reason: SDUPTHER

## 2018-10-22 NOTE — PROGRESS NOTES
Subjective:      Tayler Owen is a 51 y.o. female who presents today for FU of Follow-up (f/u on blood pressure   states bp has been running low for past 2 weeks)    Patient presents today to follow up to discuss her blood pressure. Patient states her blood pressure has been running low for the past 2 weeks and dr Sejal Hall had advised her at her visit to see her pcp in regards to the blood pressure issue  Patient denies any exertional chest pain, dyspnea, palpitations, syncope, orthopnea, edema or paroxysmal nocturnal dyspnea. The patient denies any symptoms of neurological impairment or TIA's; no amaurosis, diplopia, dysphasia, or unilateral disturbance of motor or sensory function. No loss of balance or vertigo.      Past Medical History:   Diagnosis Date    Abnormal cardiovascular stress test 4/20/2015    Anemia     Anxiety disorder     Arthritis     Asthma     Cerebral artery occlusion with cerebral infarction ([address]     TIZ x 3    Chest pain 11/3/2014    Chronic hepatitis B ([address] 12/12/2013    Chronic hepatitis C without hepatic coma (HCC) 12/12/2013    Chronic low back pain     Cirrhosis (HCC)     COPD (chronic obstructive pulmonary disease) (HCC)     Dizziness 4/20/2015    DM type 2 with diabetic peripheral neuropathy ([address] 4/16/2016    DM type 2 with diabetic peripheral neuropathy (HCC) 4/16/2016    Epidural lipomatosis     GERD (gastroesophageal reflux disease)     Gout     Headache 4/20/2015    Headache 4/20/2015    Hepatitis C     History of blood transfusion 2016?    no reaction     History of peptic ulcer disease     History of seizures     History of TIA (transient ischemic attack)     Hyperlipidemia     Hypertension     Lumbar stenosis     Obesity     Osteoarthritis of spine with radiculopathy, thoracolumbar region     Psoriasis     SOB (shortness of breath) 5/22/2015    Type II or unspecified type diabetes mellitus without mention of complication, not stated as  CARPAL TUNNEL RELEASE Bilateral      SECTION      CHOLECYSTECTOMY      FINGER TRIGGER RELEASE Bilateral     HYSTERECTOMY, TOTAL ABDOMINAL      KNEE ARTHROSCOPY Bilateral     LUMBAR SPINE SURGERY N/A 2017    L3-S1 DECOMPRESSION REMOVAL EPIDURAL MASS   performed by Estuardo Hodges MD at [facility]  14    DR. Prudnece Braxton    UPPER GASTROINTESTINAL ENDOSCOPY  3/24/15    [facility]     Family History   Problem Relation Age of Onset    Diabetes Mother     Heart Disease Mother     High Blood Pressure Mother     High Cholesterol Mother     Kidney Disease Mother     Arthritis Mother     Glaucoma Mother     Cataracts Mother     Seizures Mother     Heart Failure Mother     Cancer Father         lung    Diabetes Sister     High Blood Pressure Sister     Liver Disease Sister     Other Sister         flesh eating bacteria    Cancer Sister     High Blood Pressure Brother     Diabetes Brother     Heart Attack Brother     High Cholesterol Brother     Heart Disease Brother     COPD Brother     Heart Failure Brother     Other Brother         gout    Cancer Sister         leukemia    Other Brother         gout, PVD    Diabetes Brother     High Blood Pressure Brother     No Known Problems Son     No Known Problems Daughter      Social History     Social History    Marital status:      Spouse name: N/A    Number of children: N/A    Years of education: N/A     Social History Main Topics    Smoking status: Former Smoker     Packs/day: 0.50     Types: Cigarettes     Start date: 1997     Quit date: 2009    Smokeless tobacco: Never Used    Alcohol use No    Drug use: No    Sexual activity: Not Asked     Other Topics Concern    None     Social History Narrative    None     Current Outpatient Prescriptions   Medication Sig Dispense Refill    HUMALOG 100 UNIT/ML injection vial ADMINISTER 10 UNITS UNDER THE SKIN THREE TIMES DAILY BEFORE MEALS 10 sulfate 325 (65 Fe) MG tablet Take 325 mg by mouth daily (with breakfast)      potassium chloride (MICRO-K) 10 MEQ extended release capsule Take 20 mEq by mouth 2 times daily      fluticasone-salmeterol (ADVAIR HFA) 45-21 MCG/ACT inhaler Inhale 2 puffs into the lungs 2 times daily      acetaminophen (TYLENOL) 325 MG tablet Take 650 mg by mouth every 4 hours as needed for Pain      potassium chloride (KLOR-CON M) 20 MEQ extended release tablet TAKE 1 TABLET BY MOUTH TWICE DAILY 180 tablet 0     No current facility-administered medications for this visit. Current Outpatient Prescriptions on File Prior to Visit   Medication Sig Dispense Refill    lidocaine (LIDODERM) 5 % Place 1 patch onto the skin daily 12 hours on, 12 hours off. 30 patch 0    oxyCODONE-acetaminophen (PERCOCET) 5-325 MG per tablet Take 1 tablet by mouth daily as needed for Pain for up to 30 days. [facility] [place] Date: 10/13/18 30 tablet 0    gabapentin (NEURONTIN) 600 MG tablet Take 1 tablet by mouth 4 times daily for 30 days. . 120 tablet 0    tiZANidine (ZANAFLEX) 2 MG tablet Take 1 tablet by mouth 2 times daily as needed (pain spasms) 60 tablet 0    LINZESS 290 MCG CAPS capsule TAKE 1 CAPSULE BY MOUTH EVERY MORNING BEFORE BREAKFAST 30 capsule 3    glipiZIDE (GLUCOTROL XL) 5 MG extended release tablet Take 1 tablet by mouth daily 90 tablet 1    atorvastatin (LIPITOR) 40 MG tablet TAKE 1 TABLET BY MOUTH DAILY 90 tablet 1    carvedilol (COREG) 6.25 MG tablet TAKE 1 TABLET BY MOUTH TWICE DAILY 180 tablet 1    insulin detemir (LEVEMIR FLEXTOUCH) 100 UNIT/ML injection pen Inject 40 Units into the skin nightly 40 mL 1    meloxicam (MOBIC) 15 MG tablet Take 1 tablet by mouth daily 90 tablet 1    metFORMIN (GLUCOPHAGE) 1000 MG tablet TAKE 1 TABLET BY MOUTH TWICE DAILY WITH MEALS 180 tablet 1    omeprazole (PRILOSEC) 40 MG delayed release capsule Take 1 capsule by mouth daily 90 capsule 1    vitamin D (ERGOCALCIFEROL) 92474 units CAPS (NEURONTIN) 600 MG tablet Take 1 tablet by mouth 4 times daily for 30 days. . 120 tablet 0    tiZANidine (ZANAFLEX) 2 MG tablet Take 1 tablet by mouth 2 times daily as needed (pain spasms) 60 tablet 0    LINZESS 290 MCG CAPS capsule TAKE 1 CAPSULE BY MOUTH EVERY MORNING BEFORE BREAKFAST 30 capsule 3    glipiZIDE (GLUCOTROL XL) 5 MG extended release tablet Take 1 tablet by mouth daily 90 tablet 1    atorvastatin (LIPITOR) 40 MG tablet TAKE 1 TABLET BY MOUTH DAILY 90 tablet 1    carvedilol (COREG) 6.25 MG tablet TAKE 1 TABLET BY MOUTH TWICE DAILY 180 tablet 1    insulin detemir (LEVEMIR FLEXTOUCH) 100 UNIT/ML injection pen Inject 40 Units into the skin nightly 40 mL 1    meloxicam (MOBIC) 15 MG tablet Take 1 tablet by mouth daily 90 tablet 1    metFORMIN (GLUCOPHAGE) 1000 MG tablet TAKE 1 TABLET BY MOUTH TWICE DAILY WITH MEALS 180 tablet 1    omeprazole (PRILOSEC) 40 MG delayed release capsule Take 1 capsule by mouth daily 90 capsule 1    vitamin D (ERGOCALCIFEROL) 94878 units CAPS capsule TAKE 1 CAPSULE BY MOUTH 1 TIME A WEEK 12 capsule 1    furosemide (LASIX) 40 MG tablet TAKE 1 TABLET BY MOUTH DAILY 90 tablet 1    ONE TOUCH ULTRA TEST strip TEST THREE TIMES DAILY 300 strip 3    butalbital-acetaminophen-caffeine (FIORICET, ESGIC) -40 MG per tablet Take 1 tablet by mouth 2 times daily as needed for Headaches      albuterol sulfate HFA (VENTOLIN HFA) 108 (90 Base) MCG/ACT inhaler Inhale 2 puffs into the lungs every 6 hours as needed for Wheezing      topiramate (TOPAMAX) 200 MG tablet Take 200 mg by mouth 2 times daily      ferrous sulfate 325 (65 Fe) MG tablet Take 325 mg by mouth daily (with breakfast)      potassium chloride (MICRO-K) 10 MEQ extended release capsule Take 20 mEq by mouth 2 times daily      fluticasone-salmeterol (ADVAIR HFA) 45-21 MCG/ACT inhaler Inhale 2 puffs into the lungs 2 times daily      acetaminophen (TYLENOL) 325 MG tablet Take 650 mg by mouth every 4 hours as needed

## 2018-10-23 LAB
HBV DNA IU/ML: <20 IU/ML
HEP B PCR INTERP: NOT DETECTED
Lab: <1.3 LOG IU

## 2018-11-17 LAB
A/G RATIO: 1 (ref 0.9–2.4)
ALBUMIN: 3.9 G/DL (ref 3.4–5)
ALP BLD-CCNC: 81 U/L (ref 45–117)
ALT SERPL-CCNC: 39 U/L (ref 7–45)
ANION GAP SERPL CALCULATED.3IONS-SCNC: 9 MMOL/L (ref 10–20)
AST SERPL-CCNC: 31 U/L (ref 13–39)
BASOPHILS # BLD: 0.5 % (ref 0.1–1.2)
BASOPHILS ABSOLUTE: 0.03 10*3/UL (ref 0.01–0.07)
BICARBONATE: 25 MMOL/L (ref 21–32)
BILIRUB SERPL-MCNC: 0.3 MG/DL (ref 0–1.2)
BUN / CREAT RATIO: 25 (ref 5–25)
CALCIUM SERPL-MCNC: 9 MG/DL (ref 8.6–10.3)
CHLORIDE BLD-SCNC: 109 MMOL/L (ref 98–107)
CHOLESTEROL/HDL RATIO: 4.2
CHOLESTEROL: 208 MG/DL
CREAT SERPL-MCNC: 0.83 MG/DL (ref 0.5–1.05)
EOSINOPHIL # BLD: 1.3 % (ref 0–8.1)
EOSINOPHILS ABSOLUTE: 0.08 10*3/UL (ref 0.04–0.5)
ERYTHROCYTE [DISTWIDTH] IN BLOOD BY AUTOMATED COUNT: 17.6 % (ref 12–15.4)
ERYTHROCYTE [DISTWIDTH] IN BLOOD BY AUTOMATED COUNT: 55.1 FL (ref 39.3–48.6)
FERRITIN: 8 NG/ML (ref 8–150)
GFR CALCULATED: >60
GLUCOSE: 105 MG/DL (ref 70–100)
HBA1C MFR BLD: 5.8 % (ref 4–6)
HCT VFR BLD CALC: 35 % (ref 36.5–46.6)
HDLC SERPL-MCNC: 50 MG/DL
HEMOGLOBIN: 10.1 G/DL (ref 11.8–15.3)
IMMATURE GRANS (ABS): 0.01 10*3/UL (ref 0–0.21)
IMMATURE GRANULOCYTES: 0.2 %
IRON SATURATION: 20 % (ref 14–27)
IRON: 65 UG/DL (ref 35–150)
LDL CHOLESTEROL CALCULATED: 133 MG/DL
LYMPHOCYTES # BLD: 35.9 % (ref 15.7–50.5)
LYMPHOCYTES ABSOLUTE: 2.2 10*3/UL (ref 0.4–2.84)
MCH RBC QN AUTO: 24.7 PG (ref 27.5–33)
MCHC RBC AUTO-ENTMCNC: 28.9 G/DL (ref 30.1–35)
MCV RBC AUTO: 85.6 FL (ref 85.4–100)
MONOCYTES # BLD: 7.2 % (ref 4.8–12.7)
MONOCYTES ABSOLUTE: 0.44 10*3/UL (ref 0.25–0.83)
NEUTROPHILS ABSOLUTE: 3.37 10*3/UL (ref 1.95–6.85)
NEUTROPHILS: 54.9 % (ref 36.8–73.2)
NUCLEATED RBCS: 0 /100{WBCS}
PLATELET # BLD: 144 10*3/UL (ref 155–404)
PMV BLD AUTO: 13.1 FL (ref 9.9–12.1)
POTASSIUM SERPL-SCNC: 3.9 MMOL/L (ref 3.5–5.1)
RBC: 4.09 10*6/UL (ref 3.85–5.1)
RBCS COUNTED: 0 10*3/UL
SODIUM BLD-SCNC: 139 MMOL/L (ref 136–145)
TOTAL IRON BINDING CAPACITY: 324 UG/DL (ref 250–565)
TOTAL PROTEIN: 8 G/DL (ref 6.4–8.2)
TRIGL SERPL-MCNC: 124 MG/DL
UNSATURATED IRON BINDING CAPACITY: 259 UG/DL (ref 90–340)
UREA NITROGEN: 21 MG/DL (ref 6–23)
VLDLC SERPL CALC-MCNC: 25 MG/DL
WBC: 6.1 10*3/UL (ref 4.4–9.9)

## 2018-11-19 ENCOUNTER — OFFICE VISIT (OUTPATIENT)
Dept: FAMILY MEDICINE CLINIC | Age: 48
End: 2018-11-19
Payer: COMMERCIAL

## 2018-11-19 VITALS
RESPIRATION RATE: 18 BRPM | DIASTOLIC BLOOD PRESSURE: 62 MMHG | TEMPERATURE: 97 F | HEART RATE: 76 BPM | SYSTOLIC BLOOD PRESSURE: 104 MMHG | BODY MASS INDEX: 45.54 KG/M2 | WEIGHT: 241.2 LBS | HEIGHT: 61 IN

## 2018-11-19 DIAGNOSIS — I10 ESSENTIAL HYPERTENSION: ICD-10-CM

## 2018-11-19 DIAGNOSIS — E11.42 DM TYPE 2 WITH DIABETIC PERIPHERAL NEUROPATHY (HCC): Primary | ICD-10-CM

## 2018-11-19 DIAGNOSIS — K21.9 GASTROESOPHAGEAL REFLUX DISEASE WITHOUT ESOPHAGITIS: ICD-10-CM

## 2018-11-19 DIAGNOSIS — E66.01 MORBID OBESITY WITH BMI OF 45.0-49.9, ADULT (HCC): ICD-10-CM

## 2018-11-19 DIAGNOSIS — D64.9 CHRONIC ANEMIA: ICD-10-CM

## 2018-11-19 DIAGNOSIS — E78.2 MIXED HYPERLIPIDEMIA: ICD-10-CM

## 2018-11-19 PROCEDURE — 99214 OFFICE O/P EST MOD 30 MIN: CPT | Performed by: FAMILY MEDICINE

## 2018-11-19 PROCEDURE — G8482 FLU IMMUNIZE ORDER/ADMIN: HCPCS | Performed by: FAMILY MEDICINE

## 2018-11-19 PROCEDURE — G8427 DOCREV CUR MEDS BY ELIG CLIN: HCPCS | Performed by: FAMILY MEDICINE

## 2018-11-19 PROCEDURE — 3044F HG A1C LEVEL LT 7.0%: CPT | Performed by: FAMILY MEDICINE

## 2018-11-19 PROCEDURE — 2022F DILAT RTA XM EVC RTNOPTHY: CPT | Performed by: FAMILY MEDICINE

## 2018-11-19 PROCEDURE — G8417 CALC BMI ABV UP PARAM F/U: HCPCS | Performed by: FAMILY MEDICINE

## 2018-11-19 PROCEDURE — 1036F TOBACCO NON-USER: CPT | Performed by: FAMILY MEDICINE

## 2018-11-19 RX ORDER — FERROUS ASPARTO GLYCINATE, IRON, ASCORBIC ACID, FOLIC ACID, CYANOCOBALAMIN, ZINC, SUCCINIC ACID, AND INTRINSIC FACTOR 50; 100; 60; 750; 250; 25; 15; 50; 100 MG/1; MG/1; MG/1; UG/1; UG/1; UG/1; MG/1; MG/1; MG/1
1 TABLET ORAL DAILY
Qty: 90 TABLET | Refills: 1 | Status: SHIPPED | OUTPATIENT
Start: 2018-11-19

## 2018-11-19 RX ORDER — CARVEDILOL 3.12 MG/1
3.12 TABLET ORAL 2 TIMES DAILY WITH MEALS
Qty: 180 TABLET | Refills: 0 | Status: SHIPPED | OUTPATIENT
Start: 2018-11-19 | End: 2020-04-08 | Stop reason: DRUGHIGH

## 2018-11-19 NOTE — PROGRESS NOTES
disorder ([facility] 2014    Gout     Chronic hepatitis C without hepatic coma ([facility] 2013    COPD (chronic obstructive pulmonary disease) (HCC)     Asthma     Anxiety disorder     GERD (gastroesophageal reflux disease)     Chronic low back pain     Psoriasis     Chronic anemia      Past Surgical History:   Procedure Laterality Date    ABDOMINAL HERNIA REPAIR      BREAST BIOPSY Bilateral     BREAST SURGERY Bilateral 2008    nipple and milk gland removal    CARPAL TUNNEL RELEASE Bilateral      SECTION      CHOLECYSTECTOMY      FINGER TRIGGER RELEASE Bilateral     HYSTERECTOMY, TOTAL ABDOMINAL      KNEE ARTHROSCOPY Bilateral     LUMBAR SPINE SURGERY N/A 2017    L3-S1 DECOMPRESSION REMOVAL EPIDURAL MASS   performed by Anthony Ruiz MD at [address]  [address]    DR. Natividad Betancourt    UPPER GASTROINTESTINAL ENDOSCOPY  3/24/15    [facility]     Family History   Problem Relation Age of Onset    Diabetes Mother     Heart Disease Mother     High Blood Pressure Mother     High Cholesterol Mother     Kidney Disease Mother     Arthritis Mother     Glaucoma Mother     Cataracts Mother     Seizures Mother     Heart Failure Mother     Cancer Father         lung    Diabetes Sister     High Blood Pressure Sister     Liver Disease Sister     Other Sister         flesh eating bacteria    Cancer Sister     High Blood Pressure Brother     Diabetes Brother     Heart Attack Brother     High Cholesterol Brother     Heart Disease Brother     COPD Brother     Heart Failure Brother     Other Brother         gout    Cancer Sister         leukemia    Other Brother         gout, PVD    Diabetes Brother     High Blood Pressure Brother     No Known Problems Son     No Known Problems Daughter      Social History     Social History    Marital status:      Spouse name: N/A    Number of children: N/A    Years of education: N/A     Social History Main Topics  Smoking status: Former Smoker     Packs/day: 0.50     Types: Cigarettes     Start date: 1/1/1997     Quit date: 1/1/2009    Smokeless tobacco: Never Used    Alcohol use No    Drug use: No    Sexual activity: Not Asked     Other Topics Concern    None     Social History Narrative    None     Current Outpatient Prescriptions   Medication Sig Dispense Refill    Iron Combinations (NIFEREX) TABS Take 1 tablet by mouth daily 90 tablet 1    carvedilol (COREG) 3.125 MG tablet Take 1 tablet by mouth 2 times daily (with meals) 180 tablet 0    oxyCODONE-acetaminophen (PERCOCET) 5-325 MG per tablet Take 1 tablet by mouth daily as needed for Pain for up to 30 days. [facility] Date: 11/12/18 30 tablet 0    gabapentin (NEURONTIN) 800 MG tablet Take 1 tablet by mouth 4 times daily for 30 days. . 120 tablet 0    tiZANidine (ZANAFLEX) 2 MG tablet Take 1 tablet by mouth 2 times daily as needed (pain spasms) 60 tablet 0    HUMALOG 100 UNIT/ML injection vial ADMINISTER 10 UNITS UNDER THE SKIN THREE TIMES DAILY BEFORE MEALS 10 mL 3    vitamin B-12 (CYANOCOBALAMIN) 1000 MCG tablet Take 1,000 mcg by mouth daily      potassium chloride (KLOR-CON M) 20 MEQ extended release tablet TAKE 1 TABLET BY MOUTH TWICE DAILY 180 tablet 0    lidocaine (LIDODERM) 5 % Place 1 patch onto the skin daily 12 hours on, 12 hours off.  30 patch 0    LINZESS 290 MCG CAPS capsule TAKE 1 CAPSULE BY MOUTH EVERY MORNING BEFORE BREAKFAST 30 capsule 3    glipiZIDE (GLUCOTROL XL) 5 MG extended release tablet Take 1 tablet by mouth daily 90 tablet 1    atorvastatin (LIPITOR) 40 MG tablet TAKE 1 TABLET BY MOUTH DAILY 90 tablet 1    insulin detemir (LEVEMIR FLEXTOUCH) 100 UNIT/ML injection pen Inject 40 Units into the skin nightly 40 mL 1    meloxicam (MOBIC) 15 MG tablet Take 1 tablet by mouth daily 90 tablet 1    metFORMIN (GLUCOPHAGE) 1000 MG tablet TAKE 1 TABLET BY MOUTH TWICE DAILY WITH MEALS 180 tablet 1    omeprazole (PRILOSEC) 40 MG delayed release capsule Take 1 capsule by mouth daily 90 capsule 1    vitamin D (ERGOCALCIFEROL) 28798 units CAPS capsule TAKE 1 CAPSULE BY MOUTH 1 TIME A WEEK 12 capsule 1    furosemide (LASIX) 40 MG tablet TAKE 1 TABLET BY MOUTH DAILY 90 tablet 1    ONE TOUCH ULTRA TEST strip TEST THREE TIMES DAILY 300 strip 3    butalbital-acetaminophen-caffeine (FIORICET, ESGIC) -40 MG per tablet Take 1 tablet by mouth 2 times daily as needed for Headaches      albuterol sulfate HFA (VENTOLIN HFA) 108 (90 Base) MCG/ACT inhaler Inhale 2 puffs into the lungs every 6 hours as needed for Wheezing      topiramate (TOPAMAX) 200 MG tablet Take 200 mg by mouth 2 times daily      ferrous sulfate 325 (65 Fe) MG tablet Take 325 mg by mouth daily (with breakfast)      fluticasone-salmeterol (ADVAIR HFA) 45-21 MCG/ACT inhaler Inhale 2 puffs into the lungs 2 times daily      acetaminophen (TYLENOL) 325 MG tablet Take 650 mg by mouth every 4 hours as needed for Pain       No current facility-administered medications for this visit.       Allergies   Allergen Reactions    Heparin Shortness Of Breath    Pcn [Penicillins] Shortness Of Breath       Review of Systems  Constitutional: negative for anorexia, fatigue, fevers, sweats and weight loss  Eyes: negative for color blindness, irritation, redness and visual disturbance  Ears, nose, mouth, throat, and face: negative for ear drainage, hearing loss, nasal congestion, sore mouth, tinnitus and voice change  Respiratory: negative for cough, dyspnea on exertion, shortness of breath and wheezing  Cardiovascular: negative for chest pain, dyspnea, lower extremity edema, orthopnea, palpitations, paroxysmal nocturnal dyspnea and tachypnea  Gastrointestinal: negative for abdominal pain, constipation, diarrhea, dyspepsia, dysphagia, nausea, odynophagia, reflux symptoms and vomiting  Genitourinary:negative for decreased stream, dysuria, frequency, hematuria, hesitancy and urinary MG tablet Take 1 tablet by mouth 2 times daily as needed (pain spasms) 60 tablet 0    HUMALOG 100 UNIT/ML injection vial ADMINISTER 10 UNITS UNDER THE SKIN THREE TIMES DAILY BEFORE MEALS 10 mL 3    vitamin B-12 (CYANOCOBALAMIN) 1000 MCG tablet Take 1,000 mcg by mouth daily      potassium chloride (KLOR-CON M) 20 MEQ extended release tablet TAKE 1 TABLET BY MOUTH TWICE DAILY 180 tablet 0    lidocaine (LIDODERM) 5 % Place 1 patch onto the skin daily 12 hours on, 12 hours off.  30 patch 0    LINZESS 290 MCG CAPS capsule TAKE 1 CAPSULE BY MOUTH EVERY MORNING BEFORE BREAKFAST 30 capsule 3    glipiZIDE (GLUCOTROL XL) 5 MG extended release tablet Take 1 tablet by mouth daily 90 tablet 1    atorvastatin (LIPITOR) 40 MG tablet TAKE 1 TABLET BY MOUTH DAILY 90 tablet 1    insulin detemir (LEVEMIR FLEXTOUCH) 100 UNIT/ML injection pen Inject 40 Units into the skin nightly 40 mL 1    meloxicam (MOBIC) 15 MG tablet Take 1 tablet by mouth daily 90 tablet 1    metFORMIN (GLUCOPHAGE) 1000 MG tablet TAKE 1 TABLET BY MOUTH TWICE DAILY WITH MEALS 180 tablet 1    omeprazole (PRILOSEC) 40 MG delayed release capsule Take 1 capsule by mouth daily 90 capsule 1    vitamin D (ERGOCALCIFEROL) 64797 units CAPS capsule TAKE 1 CAPSULE BY MOUTH 1 TIME A WEEK 12 capsule 1    furosemide (LASIX) 40 MG tablet TAKE 1 TABLET BY MOUTH DAILY 90 tablet 1    ONE TOUCH ULTRA TEST strip TEST THREE TIMES DAILY 300 strip 3    butalbital-acetaminophen-caffeine (FIORICET, ESGIC) -40 MG per tablet Take 1 tablet by mouth 2 times daily as needed for Headaches      albuterol sulfate HFA (VENTOLIN HFA) 108 (90 Base) MCG/ACT inhaler Inhale 2 puffs into the lungs every 6 hours as needed for Wheezing      topiramate (TOPAMAX) 200 MG tablet Take 200 mg by mouth 2 times daily      ferrous sulfate 325 (65 Fe) MG tablet Take 325 mg by mouth daily (with breakfast)      fluticasone-salmeterol (ADVAIR HFA) 45-21 MCG/ACT inhaler Inhale 2 puffs into the

## 2018-11-20 ENCOUNTER — TELEPHONE (OUTPATIENT)
Dept: FAMILY MEDICINE CLINIC | Age: 48
End: 2018-11-20

## 2018-11-29 RX ORDER — SPIRONOLACTONE 25 MG/1
25 TABLET ORAL DAILY
Qty: 30 TABLET | Refills: 0 | OUTPATIENT
Start: 2018-11-29

## 2018-11-29 NOTE — TELEPHONE ENCOUNTER
Pharmacy requesting refill.  Last OV 11/19/18 rx refused, patient no longer on spironolactone    Future Appointments  Date Time Provider Denisa Quan   11/29/2018 10:30 AM Marleni Cueto MD [facility]   12/10/2018 8:45 AM Viral Villeda MD [facility]   2/19/2019 11:00 AM Dat Obrien MD [facility]   3/4/2019 10:00 AM [facility] [address] [facility]   3/11/2019 8:15 AM Louis Navarrete MD [address]

## 2018-12-14 LAB — DIABETIC RETINOPATHY: NEGATIVE

## 2018-12-22 DIAGNOSIS — F51.04 CHRONIC INSOMNIA: ICD-10-CM

## 2018-12-23 NOTE — TELEPHONE ENCOUNTER
PATIENT LAST SEEN 11/19/18  Please approve or deny    *not seeing this medication in pt chart        Future Appointments  Date Time Provider Denisa Kelseyi   1/10/2019 9:30 AM Anh Hwang MD [facility]   2/19/2019 11:00 AM Charisma Romero MD [facility]   3/4/2019 10:00 AM [facility] ULTRASOUND [address] [facility] [address]   3/11/2019 8:15 AM Kelly Carballo MD [address] [facility]

## 2018-12-24 RX ORDER — ZOLPIDEM TARTRATE 10 MG/1
5 TABLET ORAL NIGHTLY PRN
Qty: 15 TABLET | Refills: 3 | Status: SHIPPED | OUTPATIENT
Start: 2018-12-24 | End: 2019-01-23

## 2018-12-24 NOTE — TELEPHONE ENCOUNTER
Please call patient that she needs to take half a tablet according to new recommendation on this prescription.   Maximum dose is now 5 mg and we can not prescribe higher dose

## 2019-01-28 RX ORDER — POTASSIUM CHLORIDE 20 MEQ/1
TABLET, EXTENDED RELEASE ORAL
Qty: 180 TABLET | Refills: 0 | Status: SHIPPED | OUTPATIENT
Start: 2019-01-28

## 2019-02-13 DIAGNOSIS — E11.42 DM TYPE 2 WITH DIABETIC PERIPHERAL NEUROPATHY (HCC): ICD-10-CM

## 2019-02-14 ENCOUNTER — TELEPHONE (OUTPATIENT)
Dept: FAMILY MEDICINE CLINIC | Age: 49
End: 2019-02-14

## 2019-02-14 DIAGNOSIS — G40.909 SEIZURE DISORDER (HCC): Primary | ICD-10-CM

## 2019-02-19 LAB
A/G RATIO: 1.1 (ref 0.9–2.4)
ALBUMIN: 3.9 G/DL (ref 3.4–5)
ALP BLD-CCNC: 62 U/L (ref 45–117)
ALT SERPL-CCNC: 14 U/L (ref 7–45)
ANION GAP SERPL CALCULATED.3IONS-SCNC: 11 MMOL/L (ref 10–20)
AST SERPL-CCNC: 15 U/L (ref 13–39)
BASOPHILS # BLD: 0.5 % (ref 0.1–1.2)
BASOPHILS ABSOLUTE: 0.03 10*3/UL (ref 0.01–0.07)
BICARBONATE: 25 MMOL/L (ref 21–32)
BILIRUB SERPL-MCNC: 0.3 MG/DL (ref 0–1.2)
BUN / CREAT RATIO: 21 (ref 5–25)
CALCIUM SERPL-MCNC: 9 MG/DL (ref 8.6–10.3)
CHLORIDE BLD-SCNC: 109 MMOL/L (ref 98–107)
CHOLESTEROL/HDL RATIO: 3.7
CHOLESTEROL: 179 MG/DL
CREAT SERPL-MCNC: 0.78 MG/DL (ref 0.5–1.05)
CREATININE URINE: 94 MG/DL
EOSINOPHIL # BLD: 2 % (ref 0–8.1)
EOSINOPHILS ABSOLUTE: 0.13 10*3/UL (ref 0.04–0.5)
ERYTHROCYTE [DISTWIDTH] IN BLOOD BY AUTOMATED COUNT: 18.4 % (ref 12–15.4)
ERYTHROCYTE [DISTWIDTH] IN BLOOD BY AUTOMATED COUNT: 57.6 FL (ref 39.3–48.6)
GFR CALCULATED: >60
GLUCOSE: 108 MG/DL (ref 70–100)
HBA1C MFR BLD: 6.1 % (ref 4–6)
HCT VFR BLD CALC: 34.3 % (ref 36.5–46.6)
HDLC SERPL-MCNC: 48 MG/DL
HEMOGLOBIN: 10.3 G/DL (ref 11.8–15.3)
IMMATURE GRANS (ABS): 0.01 10*3/UL (ref 0–0.21)
IMMATURE GRANULOCYTES: 0.2 %
LDL CHOLESTEROL CALCULATED: 111 MG/DL
LYMPHOCYTES # BLD: 37.7 % (ref 15.7–50.5)
LYMPHOCYTES ABSOLUTE: 2.44 10*3/UL (ref 0.4–2.84)
MCH RBC QN AUTO: 26 PG (ref 27.5–33)
MCHC RBC AUTO-ENTMCNC: 30 G/DL (ref 30.1–35)
MCV RBC AUTO: 86.6 FL (ref 85.4–100)
MICROALBUMIN/CREAT 24H UR: <5 MG/L (ref 10–30)
MICROALBUMIN/CREAT UR-RTO: ABNORMAL (ref 0–50)
MONOCYTES # BLD: 6 % (ref 4.8–12.7)
MONOCYTES ABSOLUTE: 0.39 10*3/UL (ref 0.25–0.83)
NEUTROPHILS ABSOLUTE: 3.47 10*3/UL (ref 1.95–6.85)
NEUTROPHILS: 53.6 % (ref 36.8–73.2)
NUCLEATED RBCS: 0 /100{WBCS}
PLATELET # BLD: 131 10*3/UL (ref 155–404)
PMV BLD AUTO: 13.2 FL (ref 9.9–12.1)
POTASSIUM SERPL-SCNC: 3.7 MMOL/L (ref 3.5–5.1)
RBC: 3.96 10*6/UL (ref 3.85–5.1)
RBCS COUNTED: 0 10*3/UL
SODIUM BLD-SCNC: 141 MMOL/L (ref 136–145)
TOTAL PROTEIN: 7.5 G/DL (ref 6.4–8.2)
TRIGL SERPL-MCNC: 100 MG/DL
UREA NITROGEN: 16 MG/DL (ref 6–23)
VLDLC SERPL CALC-MCNC: 20 MG/DL
WBC: 6.5 10*3/UL (ref 4.4–9.9)

## 2019-02-25 ENCOUNTER — OFFICE VISIT (OUTPATIENT)
Dept: FAMILY MEDICINE CLINIC | Age: 49
End: 2019-02-25
Payer: COMMERCIAL

## 2019-02-25 VITALS
WEIGHT: 242 LBS | BODY MASS INDEX: 45.69 KG/M2 | SYSTOLIC BLOOD PRESSURE: 108 MMHG | DIASTOLIC BLOOD PRESSURE: 70 MMHG | RESPIRATION RATE: 20 BRPM | TEMPERATURE: 97.7 F | HEIGHT: 61 IN | HEART RATE: 90 BPM

## 2019-02-25 DIAGNOSIS — E11.42 DM TYPE 2 WITH DIABETIC PERIPHERAL NEUROPATHY (HCC): Primary | ICD-10-CM

## 2019-02-25 DIAGNOSIS — I10 ESSENTIAL HYPERTENSION: ICD-10-CM

## 2019-02-25 DIAGNOSIS — K21.9 GASTROESOPHAGEAL REFLUX DISEASE, ESOPHAGITIS PRESENCE NOT SPECIFIED: ICD-10-CM

## 2019-02-25 DIAGNOSIS — E78.2 MIXED HYPERLIPIDEMIA: ICD-10-CM

## 2019-02-25 DIAGNOSIS — D64.9 CHRONIC ANEMIA: ICD-10-CM

## 2019-02-25 PROCEDURE — 2022F DILAT RTA XM EVC RTNOPTHY: CPT | Performed by: FAMILY MEDICINE

## 2019-02-25 PROCEDURE — 1036F TOBACCO NON-USER: CPT | Performed by: FAMILY MEDICINE

## 2019-02-25 PROCEDURE — G8482 FLU IMMUNIZE ORDER/ADMIN: HCPCS | Performed by: FAMILY MEDICINE

## 2019-02-25 PROCEDURE — G8427 DOCREV CUR MEDS BY ELIG CLIN: HCPCS | Performed by: FAMILY MEDICINE

## 2019-02-25 PROCEDURE — 99214 OFFICE O/P EST MOD 30 MIN: CPT | Performed by: FAMILY MEDICINE

## 2019-02-25 PROCEDURE — 3044F HG A1C LEVEL LT 7.0%: CPT | Performed by: FAMILY MEDICINE

## 2019-02-25 PROCEDURE — G8417 CALC BMI ABV UP PARAM F/U: HCPCS | Performed by: FAMILY MEDICINE

## 2019-02-25 RX ORDER — ZOLPIDEM TARTRATE 10 MG/1
1 TABLET ORAL NIGHTLY PRN
Refills: 3 | Status: ON HOLD | COMMUNITY
Start: 2019-02-20 | End: 2020-09-05 | Stop reason: HOSPADM

## 2019-02-25 ASSESSMENT — PATIENT HEALTH QUESTIONNAIRE - PHQ9
1. LITTLE INTEREST OR PLEASURE IN DOING THINGS: 0
SUM OF ALL RESPONSES TO PHQ QUESTIONS 1-9: 0
2. FEELING DOWN, DEPRESSED OR HOPELESS: 0
SUM OF ALL RESPONSES TO PHQ QUESTIONS 1-9: 0
SUM OF ALL RESPONSES TO PHQ9 QUESTIONS 1 & 2: 0

## 2019-03-05 ENCOUNTER — HOSPITAL ENCOUNTER (OUTPATIENT)
Dept: ULTRASOUND IMAGING | Age: 49
Discharge: HOME | End: 2019-03-07
Payer: COMMERCIAL

## 2019-03-05 DIAGNOSIS — B18.2 CHRONIC HEPATITIS C WITHOUT HEPATIC COMA (HCC): ICD-10-CM

## 2019-03-05 DIAGNOSIS — B16.9 ACUTE VIRAL HEPATITIS B WITHOUT COMA AND WITHOUT DELTA AGENT: ICD-10-CM

## 2019-03-05 PROCEDURE — 76705 ECHO EXAM OF ABDOMEN: CPT

## 2019-03-14 DIAGNOSIS — D64.9 CHRONIC ANEMIA: Primary | ICD-10-CM

## 2019-03-14 LAB
HEMOCCULT STL QL: NEGATIVE

## 2019-03-14 PROCEDURE — 82270 OCCULT BLOOD FECES: CPT | Performed by: FAMILY MEDICINE

## 2019-03-22 LAB
ERYTHROCYTE [DISTWIDTH] IN BLOOD BY AUTOMATED COUNT: 17.7 % (ref 12–15.4)
ERYTHROCYTE [DISTWIDTH] IN BLOOD BY AUTOMATED COUNT: 58.4 FL (ref 39.3–48.6)
HCT VFR BLD CALC: 37.7 % (ref 36.5–46.6)
HEMOGLOBIN: 11.2 G/DL (ref 11.8–15.3)
MCH RBC QN AUTO: 26.6 PG (ref 27.5–33)
MCHC RBC AUTO-ENTMCNC: 29.7 G/DL (ref 30.1–35)
MCV RBC AUTO: 89.5 FL (ref 85.4–100)
NUCLEATED RBCS: 0 /100{WBCS}
PLATELET # BLD: 136 10*3/UL (ref 155–404)
PMV BLD AUTO: 14 FL (ref 9.9–12.1)
RBC: 4.21 10*6/UL (ref 3.85–5.1)
RBCS COUNTED: 0 10*3/UL
WBC: 7.2 10*3/UL (ref 4.4–9.9)

## 2019-03-25 ENCOUNTER — OFFICE VISIT (OUTPATIENT)
Dept: INFECTIOUS DISEASES | Age: 49
End: 2019-03-25
Payer: COMMERCIAL

## 2019-03-25 VITALS
DIASTOLIC BLOOD PRESSURE: 77 MMHG | HEIGHT: 61 IN | WEIGHT: 243.4 LBS | HEART RATE: 83 BPM | BODY MASS INDEX: 45.95 KG/M2 | SYSTOLIC BLOOD PRESSURE: 112 MMHG | RESPIRATION RATE: 18 BRPM | TEMPERATURE: 97.8 F

## 2019-03-25 DIAGNOSIS — B16.9 ACUTE VIRAL HEPATITIS B WITHOUT COMA AND WITHOUT DELTA AGENT: ICD-10-CM

## 2019-03-25 DIAGNOSIS — B18.2 CHRONIC HEPATITIS C WITHOUT HEPATIC COMA (HCC): Primary | ICD-10-CM

## 2019-03-25 PROCEDURE — G8482 FLU IMMUNIZE ORDER/ADMIN: HCPCS | Performed by: INTERNAL MEDICINE

## 2019-03-25 PROCEDURE — 99214 OFFICE O/P EST MOD 30 MIN: CPT | Performed by: INTERNAL MEDICINE

## 2019-03-25 PROCEDURE — G8417 CALC BMI ABV UP PARAM F/U: HCPCS | Performed by: INTERNAL MEDICINE

## 2019-03-25 PROCEDURE — 1036F TOBACCO NON-USER: CPT | Performed by: INTERNAL MEDICINE

## 2019-03-25 PROCEDURE — G8427 DOCREV CUR MEDS BY ELIG CLIN: HCPCS | Performed by: INTERNAL MEDICINE

## 2019-03-25 ASSESSMENT — ENCOUNTER SYMPTOMS
ABDOMINAL PAIN: 1
SHORTNESS OF BREATH: 1
DIARRHEA: 1

## 2019-04-02 RX ORDER — ATORVASTATIN CALCIUM 40 MG/1
TABLET, FILM COATED ORAL
Qty: 30 TABLET | Refills: 0 | Status: SHIPPED | OUTPATIENT
Start: 2019-04-02

## 2019-04-02 RX ORDER — GLIPIZIDE 5 MG/1
5 TABLET, FILM COATED, EXTENDED RELEASE ORAL DAILY
Qty: 30 TABLET | Refills: 0 | Status: SHIPPED | OUTPATIENT
Start: 2019-04-02

## 2019-09-16 ENCOUNTER — OFFICE VISIT (OUTPATIENT)
Dept: INFECTIOUS DISEASES | Age: 49
End: 2019-09-16
Payer: COMMERCIAL

## 2019-09-16 VITALS
DIASTOLIC BLOOD PRESSURE: 75 MMHG | RESPIRATION RATE: 18 BRPM | SYSTOLIC BLOOD PRESSURE: 113 MMHG | HEART RATE: 88 BPM | HEIGHT: 61 IN | WEIGHT: 243.8 LBS | BODY MASS INDEX: 46.03 KG/M2 | TEMPERATURE: 97.9 F

## 2019-09-16 DIAGNOSIS — B18.2 CHRONIC HEPATITIS C WITH CIRRHOSIS (HCC): Primary | ICD-10-CM

## 2019-09-16 DIAGNOSIS — K74.60 CHRONIC HEPATITIS C WITH CIRRHOSIS (HCC): Primary | ICD-10-CM

## 2019-09-16 PROCEDURE — 3017F COLORECTAL CA SCREEN DOC REV: CPT | Performed by: INTERNAL MEDICINE

## 2019-09-16 PROCEDURE — G8417 CALC BMI ABV UP PARAM F/U: HCPCS | Performed by: INTERNAL MEDICINE

## 2019-09-16 PROCEDURE — 1036F TOBACCO NON-USER: CPT | Performed by: INTERNAL MEDICINE

## 2019-09-16 PROCEDURE — G8427 DOCREV CUR MEDS BY ELIG CLIN: HCPCS | Performed by: INTERNAL MEDICINE

## 2019-09-16 PROCEDURE — 99214 OFFICE O/P EST MOD 30 MIN: CPT | Performed by: INTERNAL MEDICINE

## 2019-09-16 NOTE — PROGRESS NOTES
Subjective:      Patient ID: Nabil Alvarado is a 47 y.o. female. HPI  F/U [facility], cured  Hep B infection  + Cirrhosis by Fibroscan  F2 by Marjorie Dougherty for pain management. Has low back pain, had R TKR 1 month ago, using the walker. Has postop infection and is on oral antibiotics by Dr Dennis Santizo  PMHx, allergies and social Hx were reviewed    Review of Systems   Constitutional: Positive for appetite change (not good) and fatigue. Negative for unexpected weight change. Cardiovascular: Positive for leg swelling. Skin: Positive for wound (R knee incision). Neurological: Positive for weakness. All other systems reviewed and are negative. Objective:   Physical Exam   Constitutional: She is oriented to person, place, and time. No distress. HENT:   Mouth/Throat: No oropharyngeal exudate. Eyes: No scleral icterus. Neck: Normal range of motion. Neck supple. Cardiovascular: Normal rate, regular rhythm and normal heart sounds. No murmur heard. Pulmonary/Chest: Effort normal and breath sounds normal. No respiratory distress. She has no wheezes. She has no rales. Abdominal: Soft. Bowel sounds are normal. She exhibits no distension and no mass. There is no tenderness. Musculoskeletal: She exhibits edema. Lymphadenopathy:     She has no cervical adenopathy. Neurological: She is alert and oriented to person, place, and time. No cranial nerve deficit. She exhibits normal muscle tone. Coordination normal.   Skin: No rash noted. No erythema. Psychiatric: She has a normal mood and affect. Her behavior is normal.   Nursing note and vitals reviewed.   R knee upper incision with minimal drainage      CONCLUSION: UNREMARKABLE LIMITED RIGHT UPPER QUADRANT ABDOMINAL SONOGRAM. THERE IS NO HEPATIC MASS.           Assessment:      Liver cirrhosis  [facility], cured  Hep B infection, negative HBsAg          Plan:      Liver U/S, AFP, Hep BsAG, HBV VL, CBC CMP Q 6 months          Magdalene Gaxiola MD

## 2019-09-19 ENCOUNTER — OFFICE VISIT (OUTPATIENT)
Dept: CARDIOLOGY CLINIC | Age: 49
End: 2019-09-19
Payer: COMMERCIAL

## 2019-09-19 VITALS
WEIGHT: 243 LBS | HEIGHT: 61 IN | DIASTOLIC BLOOD PRESSURE: 70 MMHG | HEART RATE: 88 BPM | SYSTOLIC BLOOD PRESSURE: 110 MMHG | BODY MASS INDEX: 45.88 KG/M2

## 2019-09-19 DIAGNOSIS — I10 ESSENTIAL HYPERTENSION: Primary | ICD-10-CM

## 2019-09-19 DIAGNOSIS — M79.604 RIGHT LEG PAIN: ICD-10-CM

## 2019-09-19 PROCEDURE — 99204 OFFICE O/P NEW MOD 45 MIN: CPT | Performed by: INTERNAL MEDICINE

## 2019-09-19 PROCEDURE — G8427 DOCREV CUR MEDS BY ELIG CLIN: HCPCS | Performed by: INTERNAL MEDICINE

## 2019-09-19 PROCEDURE — 3017F COLORECTAL CA SCREEN DOC REV: CPT | Performed by: INTERNAL MEDICINE

## 2019-09-19 PROCEDURE — 93000 ELECTROCARDIOGRAM COMPLETE: CPT | Performed by: INTERNAL MEDICINE

## 2019-09-19 PROCEDURE — 1036F TOBACCO NON-USER: CPT | Performed by: INTERNAL MEDICINE

## 2019-09-19 PROCEDURE — G8417 CALC BMI ABV UP PARAM F/U: HCPCS | Performed by: INTERNAL MEDICINE

## 2019-09-19 NOTE — PROGRESS NOTES
Chief Complaint   Patient presents with   [facility] Cardiologist     DR Jane Jones    Leg Pain       9-19-19: Patient presents for initial medical evaluation. Patient is followed on a regular basis by Dr. Adriana Roper MD. Following with dr. Kelsey Hurt. Sent for evaluation of leg pain. S/p back surgery a few years ago and since she's had no feeling in right anterior tibial area and pain. + hx of DM, HTN, HLP. No smoking. No hx of LE vascular procedures. S/p ECHO in 2/18 with normal LVF. Hx of LHC in 2015 which was normal. + hx of TIA in the past as well. Pt denies chest pain, dyspnea, dyspnea on exertion, change in exercise capacity, fatigue,  nausea, vomiting, diarrhea, constipation, motor weakness, insomnia, weight loss, syncope, dizziness, lightheadedness, palpitations, PND, orthopnea.            Patient Active Problem List   Diagnosis    COPD (chronic obstructive pulmonary disease) (HCC)    Asthma    Anxiety disorder    GERD (gastroesophageal reflux disease)    Chronic low back pain    Psoriasis    Chronic anemia    Chronic hepatitis C without hepatic coma (HCC)    Gout    Seizure disorder ([address]    Intractable chronic cluster headache    DM type 2 with diabetic peripheral neuropathy ([address]    Mixed hyperlipidemia    Essential hypertension    Vitamin D deficiency    Gastroesophageal reflux disease without esophagitis    Osteoarthritis of spine with radiculopathy, thoracolumbar region    Morbid obesity due to excess calories (HCC)    Arthralgia of left knee    Transitional vertebrae    Lumbar radiculopathy    Lumbar canal stenosis    Arthritis of right knee    Epidural lipomatosis    Status post lumbar spine surgery for decompression of spinal cord    Abscess in epidural space of L2-L5 lumbar spine    Fever    Back pain    Right lumbar radiculopathy    Lumbar stenosis with neurogenic claudication    Hypokalemia    Spasm of back muscles    Right foot drop    Pseudoclaudication Gets together: Not on file     Attends Confucianism service: Not on file     Active member of club or organization: Not on file     Attends meetings of clubs or organizations: Not on file     Relationship status: Not on file    Intimate partner violence:     Fear of current or ex partner: Not on file     Emotionally abused: Not on file     Physically abused: Not on file     Forced sexual activity: Not on file   Other Topics Concern    Not on file   Social History Narrative    Not on file       Family History   Problem Relation Age of Onset    Diabetes Mother     Heart Disease Mother     High Blood Pressure Mother     High Cholesterol Mother     Kidney Disease Mother     Arthritis Mother     Glaucoma Mother     Cataracts Mother     Seizures Mother     Heart Failure Mother     Cancer Father         lung    Diabetes Sister     High Blood Pressure Sister     Liver Disease Sister     Other Sister         flesh eating bacteria    Cancer Sister     High Blood Pressure Brother     Diabetes Brother     Heart Attack Brother     High Cholesterol Brother     Heart Disease Brother     COPD Brother     Heart Failure Brother     Other Brother         gout    Cancer Sister         leukemia    Other Brother         gout, PVD    Diabetes Brother     High Blood Pressure Brother     No Known Problems Son     No Known Problems Daughter        Current Outpatient Medications   Medication Sig Dispense Refill    aspirin 81 MG tablet Take 81 mg by mouth daily      mupirocin (BACTROBAN) 2 % ointment Apply topically 3 times daily. 30 g 0    lidocaine 4 % external patch Place 1 patch onto the skin daily 30 patch 0    atorvastatin (LIPITOR) 40 MG tablet TAKE 1 TABLET BY MOUTH DAILY 30 tablet 0    glipiZIDE (GLUCOTROL XL) 5 MG extended release tablet Take 1 tablet by mouth daily 30 tablet 0    zolpidem (AMBIEN) 10 MG tablet Take 1 tablet by mouth nightly as needed. .  3    vitamin D (ERGOCALCIFEROL) 96168 units

## 2019-09-24 ENCOUNTER — HOSPITAL ENCOUNTER (OUTPATIENT)
Dept: ULTRASOUND IMAGING | Age: 49
Discharge: HOME | End: 2019-09-26
Payer: COMMERCIAL

## 2019-09-24 DIAGNOSIS — B18.2 CHRONIC HEPATITIS C WITH CIRRHOSIS (HCC): ICD-10-CM

## 2019-09-24 DIAGNOSIS — M79.604 RIGHT LEG PAIN: ICD-10-CM

## 2019-09-24 DIAGNOSIS — K74.60 CHRONIC HEPATITIS C WITH CIRRHOSIS (HCC): ICD-10-CM

## 2019-09-24 LAB
ALBUMIN: 4.1 G/DL (ref 3.4–5)
ALP BLD-CCNC: 63 U/L (ref 33–110)
ALT SERPL-CCNC: 12 U/L (ref 7–45)
ANION GAP SERPL CALCULATED.3IONS-SCNC: 13 MMOL/L (ref 10–20)
AST SERPL-CCNC: 15 U/L (ref 9–39)
BICARBONATE: 25 MMOL/L (ref 21–32)
BILIRUB SERPL-MCNC: 0.2 MG/DL (ref 0–1.2)
CALCIUM SERPL-MCNC: 9.2 MG/DL (ref 8.6–10.3)
CHLORIDE BLD-SCNC: 107 MMOL/L (ref 98–107)
CREAT SERPL-MCNC: 0.75 MG/DL (ref 0.5–1)
ERYTHROCYTE [DISTWIDTH] IN BLOOD BY AUTOMATED COUNT: 15.7 % (ref 11.5–14)
GFR AFRICAN AMERICAN: >60 ML/MIN/1.73M2
GFR NON-AFRICAN AMERICAN: >60 ML/MIN/1.73M2
GLUCOSE: 75 MG/DL (ref 74–99)
HCT VFR BLD CALC: 35.3 % (ref 36–46)
HEMOGLOBIN: 10.5 G/DL (ref 12–16)
MCHC RBC AUTO-ENTMCNC: 29.7 G/DL (ref 32–36)
MCV RBC AUTO: 91 FL (ref 80–100)
PLATELET # BLD: 173 X10E9/L (ref 150–450)
POTASSIUM SERPL-SCNC: 3.5 MMOL/L (ref 3.5–5.3)
RBC # BLD: 3.9 X10E12/L (ref 4–5.2)
SODIUM BLD-SCNC: 141 MMOL/L (ref 136–145)
TOTAL PROTEIN: 8.1 G/DL (ref 6.4–8.2)
UREA NITROGEN: 16 MG/DL (ref 6–23)
WBC: 7.2 X10E9/L (ref 4.4–11.3)

## 2019-09-24 PROCEDURE — 76705 ECHO EXAM OF ABDOMEN: CPT

## 2019-09-24 PROCEDURE — 93925 LOWER EXTREMITY STUDY: CPT

## 2019-09-25 LAB
AFP-TUMOR MARKER: 6 NG/ML (ref 0–9)
HEPATITIS B SURFACE ANTIGEN: NONREACTIVE
SPECIMEN SOURCE: NORMAL
SPECIMEN SOURCE: NORMAL

## 2019-10-07 ENCOUNTER — TELEPHONE (OUTPATIENT)
Dept: INFECTIOUS DISEASES | Age: 49
End: 2019-10-07

## 2019-10-17 ENCOUNTER — OFFICE VISIT (OUTPATIENT)
Dept: CARDIOLOGY CLINIC | Age: 49
End: 2019-10-17
Payer: COMMERCIAL

## 2019-10-17 VITALS
WEIGHT: 246 LBS | BODY MASS INDEX: 46.44 KG/M2 | DIASTOLIC BLOOD PRESSURE: 80 MMHG | OXYGEN SATURATION: 98 % | HEIGHT: 61 IN | SYSTOLIC BLOOD PRESSURE: 110 MMHG | HEART RATE: 85 BPM

## 2019-10-17 DIAGNOSIS — E78.2 MIXED HYPERLIPIDEMIA: ICD-10-CM

## 2019-10-17 DIAGNOSIS — R93.6 ABNORMAL ULTRASOUND OF LOWER EXTREMITY: ICD-10-CM

## 2019-10-17 DIAGNOSIS — E66.01 MORBID OBESITY WITH BMI OF 45.0-49.9, ADULT (HCC): Primary | ICD-10-CM

## 2019-10-17 DIAGNOSIS — I10 ESSENTIAL HYPERTENSION: ICD-10-CM

## 2019-10-17 PROCEDURE — G8484 FLU IMMUNIZE NO ADMIN: HCPCS | Performed by: INTERNAL MEDICINE

## 2019-10-17 PROCEDURE — 99213 OFFICE O/P EST LOW 20 MIN: CPT | Performed by: INTERNAL MEDICINE

## 2019-10-17 PROCEDURE — G8417 CALC BMI ABV UP PARAM F/U: HCPCS | Performed by: INTERNAL MEDICINE

## 2019-10-17 PROCEDURE — 1036F TOBACCO NON-USER: CPT | Performed by: INTERNAL MEDICINE

## 2019-10-17 PROCEDURE — 3017F COLORECTAL CA SCREEN DOC REV: CPT | Performed by: INTERNAL MEDICINE

## 2019-10-17 PROCEDURE — G8427 DOCREV CUR MEDS BY ELIG CLIN: HCPCS | Performed by: INTERNAL MEDICINE

## 2019-10-29 ENCOUNTER — HOSPITAL ENCOUNTER (OUTPATIENT)
Dept: CT IMAGING | Age: 49
Discharge: HOME | End: 2019-10-31
Payer: COMMERCIAL

## 2019-10-29 DIAGNOSIS — R93.6 ABNORMAL ULTRASOUND OF LOWER EXTREMITY: ICD-10-CM

## 2019-10-29 PROCEDURE — 75635 CT ANGIO ABDOMINAL ARTERIES: CPT

## 2019-10-29 PROCEDURE — 6360000004 HC RX CONTRAST MEDICATION: Performed by: INTERNAL MEDICINE

## 2019-10-29 RX ADMIN — IOPAMIDOL 100 ML: 755 INJECTION, SOLUTION INTRAVENOUS at 15:53

## 2019-10-30 ENCOUNTER — TELEPHONE (OUTPATIENT)
Dept: CARDIOLOGY CLINIC | Age: 49
End: 2019-10-30

## 2019-11-25 ENCOUNTER — PROCEDURE VISIT (OUTPATIENT)
Dept: PODIATRY | Facility: CLINIC | Age: 49
End: 2019-11-25

## 2019-11-25 VITALS — BODY MASS INDEX: 45.31 KG/M2 | TEMPERATURE: 97.9 F | WEIGHT: 240 LBS | HEIGHT: 61 IN

## 2019-11-25 DIAGNOSIS — M79.671 RIGHT FOOT PAIN: Primary | ICD-10-CM

## 2019-11-25 DIAGNOSIS — E11.42 DM TYPE 2 WITH DIABETIC PERIPHERAL NEUROPATHY (HCC): ICD-10-CM

## 2019-11-25 DIAGNOSIS — Q72.899 BRACHYMETATARSIA: ICD-10-CM

## 2019-11-25 DIAGNOSIS — M20.41 HAMMER TOES, BILATERAL: ICD-10-CM

## 2019-11-25 DIAGNOSIS — M20.42 HAMMER TOES, BILATERAL: ICD-10-CM

## 2019-11-25 ASSESSMENT — ENCOUNTER SYMPTOMS
CHEST TIGHTNESS: 0
SHORTNESS OF BREATH: 0

## 2019-12-13 ENCOUNTER — OFFICE VISIT (OUTPATIENT)
Dept: PODIATRY | Facility: CLINIC | Age: 49
End: 2019-12-13

## 2019-12-13 VITALS — HEIGHT: 61 IN | TEMPERATURE: 97.6 F | BODY MASS INDEX: 45.31 KG/M2 | WEIGHT: 240 LBS

## 2019-12-13 DIAGNOSIS — M79.672 PAIN IN BOTH FEET: Primary | ICD-10-CM

## 2019-12-13 DIAGNOSIS — E11.42 DM TYPE 2 WITH DIABETIC PERIPHERAL NEUROPATHY (HCC): ICD-10-CM

## 2019-12-13 DIAGNOSIS — M79.671 PAIN IN BOTH FEET: Primary | ICD-10-CM

## 2019-12-13 ASSESSMENT — ENCOUNTER SYMPTOMS
CHEST TIGHTNESS: 0
SHORTNESS OF BREATH: 0

## 2020-03-06 ENCOUNTER — OFFICE VISIT (OUTPATIENT)
Dept: GASTROENTEROLOGY | Age: 50
End: 2020-03-06
Payer: COMMERCIAL

## 2020-03-06 VITALS
OXYGEN SATURATION: 98 % | RESPIRATION RATE: 20 BRPM | HEART RATE: 90 BPM | BODY MASS INDEX: 40.98 KG/M2 | HEIGHT: 65 IN | WEIGHT: 246 LBS

## 2020-03-06 PROCEDURE — 99203 OFFICE O/P NEW LOW 30 MIN: CPT | Performed by: SPECIALIST

## 2020-03-06 PROCEDURE — G8484 FLU IMMUNIZE NO ADMIN: HCPCS | Performed by: SPECIALIST

## 2020-03-06 PROCEDURE — G8427 DOCREV CUR MEDS BY ELIG CLIN: HCPCS | Performed by: SPECIALIST

## 2020-03-06 PROCEDURE — 1036F TOBACCO NON-USER: CPT | Performed by: SPECIALIST

## 2020-03-06 PROCEDURE — G8417 CALC BMI ABV UP PARAM F/U: HCPCS | Performed by: SPECIALIST

## 2020-03-06 PROCEDURE — 3017F COLORECTAL CA SCREEN DOC REV: CPT | Performed by: SPECIALIST

## 2020-03-06 RX ORDER — POLYETHYLENE GLYCOL 3350, SODIUM CHLORIDE, SODIUM BICARBONATE, POTASSIUM CHLORIDE 420; 11.2; 5.72; 1.48 G/4L; G/4L; G/4L; G/4L
4000 POWDER, FOR SOLUTION ORAL ONCE
Qty: 1 BOTTLE | Refills: 0 | Status: SHIPPED | OUTPATIENT
Start: 2020-03-06 | End: 2020-03-06

## 2020-03-06 ASSESSMENT — ENCOUNTER SYMPTOMS
ABDOMINAL DISTENTION: 0
EYES NEGATIVE: 1
ANAL BLEEDING: 0
BLOOD IN STOOL: 0
RECTAL PAIN: 0
VOMITING: 0
SHORTNESS OF BREATH: 1
NAUSEA: 0
CONSTIPATION: 1
ABDOMINAL PAIN: 1
DIARRHEA: 0

## 2020-03-06 NOTE — PROGRESS NOTES
(gastroesophageal reflux disease)     Gout     Headache 2015    Headache 2015    Hepatitis C     History of blood transfusion 2016?    no reaction     History of peptic ulcer disease     History of seizures     History of TIA (transient ischemic attack)     Hyperlipidemia     Hypertension     Lumbar stenosis     Obesity     Osteoarthritis of spine with radiculopathy, thoracolumbar region     Psoriasis     SOB (shortness of breath) 2015    Type II or unspecified type diabetes mellitus without mention of complication, not stated as uncontrolled       Past Surgical History:   Procedure Laterality Date    ABDOMINAL HERNIA REPAIR      BREAST BIOPSY Bilateral     BREAST SURGERY Bilateral 2008    nipple and milk gland removal    CARPAL TUNNEL RELEASE Bilateral      SECTION      CHOLECYSTECTOMY      COLONOSCOPY  2019    DR BRADFORD Leal Clappcharbel FINGER TRIGGER RELEASE Bilateral     HYSTERECTOMY, TOTAL ABDOMINAL      KNEE ARTHROSCOPY Bilateral     LUMBAR SPINE SURGERY N/A 2017    L3-S1 DECOMPRESSION REMOVAL EPIDURAL MASS   performed by Samara Pulido MD at [address]     [facility] GASTROINTESTINAL ENDOSCOPY  3/24/15    [facility]    UPPER GASTROINTESTINAL ENDOSCOPY  2019    DR FELDER     Current Outpatient Medications on File Prior to Visit   Medication Sig Dispense Refill    tiZANidine (ZANAFLEX) 2 MG tablet Take 1 tablet by mouth 2 times daily as needed (pain spasms) 60 tablet 0    gabapentin (NEURONTIN) 800 MG tablet Take 1 tablet by mouth 4 times daily for 30 days. 120 tablet 0    oxyCODONE (ROXICODONE) 5 MG immediate release tablet Take 1 tablet by mouth daily as needed for Pain for up to 30 days. 30 tablet 0    NONFORMULARY Ibuprofen 10% ; Amitriptylline 2% ; Lidocaine 4% ; Baclofen 2%  Sig: Apply topically 1-2 grams to the affected area 3-4 times daily. Rub in well for 1-2 minutes.   Quantity to Dispense: 120gm  Refills: 5      aspirin 81 MG tablet Take 81 mg by mouth daily      atorvastatin (LIPITOR) 40 MG tablet TAKE 1 TABLET BY MOUTH DAILY 30 tablet 0    glipiZIDE (GLUCOTROL XL) 5 MG extended release tablet Take 1 tablet by mouth daily 30 tablet 0    zolpidem (AMBIEN) 10 MG tablet Take 1 tablet by mouth nightly as needed. .  3    vitamin D (ERGOCALCIFEROL) 04857 units CAPS capsule TAKE 1 CAPSULE BY MOUTH 1 TIME A WEEK 12 capsule 1    HUMALOG 100 UNIT/ML injection vial ADMINISTER 10 UNITS UNDER THE SKIN THREE TIMES DAILY BEFORE MEALS 10 mL 3    potassium chloride (KLOR-CON M) 20 MEQ extended release tablet TAKE 1 TABLET BY MOUTH TWICE DAILY 180 tablet 0    LINZESS 290 MCG CAPS capsule TAKE 1 CAPSULE BY MOUTH EVERY MORNING BEFORE BREAKFAST 30 capsule 5    furosemide (LASIX) 40 MG tablet TAKE 1 TABLET BY MOUTH DAILY 90 tablet 1    ONE TOUCH ULTRA TEST strip TEST THREE TIMES DAILY 300 each 3    Iron Combinations (NIFEREX) TABS Take 1 tablet by mouth daily 90 tablet 1    carvedilol (COREG) 3.125 MG tablet Take 1 tablet by mouth 2 times daily (with meals) 180 tablet 0    vitamin B-12 (CYANOCOBALAMIN) 1000 MCG tablet Take 1,000 mcg by mouth daily      meloxicam (MOBIC) 15 MG tablet Take 1 tablet by mouth daily 90 tablet 1    metFORMIN (GLUCOPHAGE) 1000 MG tablet TAKE 1 TABLET BY MOUTH TWICE DAILY WITH MEALS 180 tablet 1    omeprazole (PRILOSEC) 40 MG delayed release capsule Take 1 capsule by mouth daily 90 capsule 1    ONE TOUCH ULTRA TEST strip TEST THREE TIMES DAILY 300 strip 3    butalbital-acetaminophen-caffeine (FIORICET, ESGIC) -40 MG per tablet Take 1 tablet by mouth 2 times daily as needed for Headaches      albuterol sulfate HFA (VENTOLIN HFA) 108 (90 Base) MCG/ACT inhaler Inhale 2 puffs into the lungs every 6 hours as needed for Wheezing      topiramate (TOPAMAX) 200 MG tablet Take 200 mg by mouth 2 times daily      ferrous sulfate 325 (65 Fe) MG tablet Take 325 mg by mouth daily (with breakfast) Last attempt to quit: 2009     Years since quittin.1    Smokeless tobacco: Never Used   Substance and Sexual Activity    Alcohol use: No    Drug use: No    Sexual activity: None   Lifestyle    Physical activity:     Days per week: None     Minutes per session: None    Stress: None   Relationships    Social connections:     Talks on phone: None     Gets together: None     Attends Orthodoxy service: None     Active member of club or organization: None     Attends meetings of clubs or organizations: None     Relationship status: None    Intimate partner violence:     Fear of current or ex partner: None     Emotionally abused: None     Physically abused: None     Forced sexual activity: None   Other Topics Concern    None   Social History Narrative    None       Pulse 90, resp. rate 20, height 5' 5\" (1.651 m), weight 246 lb (111.6 kg), SpO2 98 %, not currently breastfeeding. Physical Exam  Constitutional:       Appearance: She is well-developed. HENT:      Head: Normocephalic and atraumatic. Eyes:      Conjunctiva/sclera: Conjunctivae normal.      Pupils: Pupils are equal, round, and reactive to light. Neck:      Musculoskeletal: Normal range of motion. Cardiovascular:      Rate and Rhythm: Normal rate. Pulmonary:      Effort: Pulmonary effort is normal.   Abdominal:      General: Bowel sounds are normal.      Palpations: Abdomen is soft. Musculoskeletal: Normal range of motion. Skin:     General: Skin is warm. Neurological:      Mental Status: She is alert.          Laboratory, Pathology, Radiology reviewed indetail with relevant important investigations summarized below:  Lab Results   Component Value Date    WBC 7.2 2019    HGB 10.5 (L) 2019    HCT 35.3 (L) 2019    MCV 91 2019     2019     Lab Results   Component Value Date    ALT 12 2019    AST 15 2019    ALKPHOS 63 2019    BILITOT 0.2 2019       No results

## 2020-03-10 ENCOUNTER — HOSPITAL ENCOUNTER (OUTPATIENT)
Dept: ULTRASOUND IMAGING | Age: 50
Discharge: HOME | End: 2020-03-12
Payer: COMMERCIAL

## 2020-03-10 PROCEDURE — 76705 ECHO EXAM OF ABDOMEN: CPT

## 2020-03-16 ENCOUNTER — OFFICE VISIT (OUTPATIENT)
Dept: INFECTIOUS DISEASES | Age: 50
End: 2020-03-16
Payer: COMMERCIAL

## 2020-03-16 VITALS
RESPIRATION RATE: 18 BRPM | TEMPERATURE: 98.1 F | BODY MASS INDEX: 40.48 KG/M2 | SYSTOLIC BLOOD PRESSURE: 125 MMHG | HEIGHT: 65 IN | HEART RATE: 79 BPM | WEIGHT: 243 LBS | DIASTOLIC BLOOD PRESSURE: 79 MMHG

## 2020-03-16 DIAGNOSIS — K74.60 CHRONIC HEPATITIS C WITH CIRRHOSIS (HCC): ICD-10-CM

## 2020-03-16 DIAGNOSIS — B19.10 HEP B W/O COMA: ICD-10-CM

## 2020-03-16 DIAGNOSIS — B18.2 CHRONIC HEPATITIS C WITH CIRRHOSIS (HCC): ICD-10-CM

## 2020-03-16 LAB
ALBUMIN SERPL-MCNC: 4.1 G/DL (ref 3.5–4.6)
ALP BLD-CCNC: 72 U/L (ref 40–130)
ALT SERPL-CCNC: 17 U/L (ref 0–33)
ANION GAP SERPL CALCULATED.3IONS-SCNC: 14 MEQ/L (ref 9–15)
AST SERPL-CCNC: 20 U/L (ref 0–35)
BILIRUB SERPL-MCNC: <0.2 MG/DL (ref 0.2–0.7)
BUN BLDV-MCNC: 11 MG/DL (ref 6–20)
CALCIUM SERPL-MCNC: 9 MG/DL (ref 8.5–9.9)
CHLORIDE BLD-SCNC: 110 MEQ/L (ref 95–107)
CO2: 19 MEQ/L (ref 20–31)
CREAT SERPL-MCNC: 0.61 MG/DL (ref 0.5–0.9)
GFR AFRICAN AMERICAN: >60
GFR NON-AFRICAN AMERICAN: >60
GLOBULIN: 4.5 G/DL (ref 2.3–3.5)
GLUCOSE BLD-MCNC: 67 MG/DL (ref 70–99)
HCT VFR BLD CALC: 39.7 % (ref 37–47)
HEMOGLOBIN: 12.3 G/DL (ref 12–16)
HEPATITIS B SURFACE ANTIGEN INTERPRETATION: NORMAL
MCH RBC QN AUTO: 27.8 PG (ref 27–31.3)
MCHC RBC AUTO-ENTMCNC: 30.9 % (ref 33–37)
MCV RBC AUTO: 89.7 FL (ref 82–100)
PDW BLD-RTO: 16.7 % (ref 11.5–14.5)
PLATELET # BLD: 143 K/UL (ref 130–400)
POTASSIUM SERPL-SCNC: 4.1 MEQ/L (ref 3.4–4.9)
RBC # BLD: 4.42 M/UL (ref 4.2–5.4)
SODIUM BLD-SCNC: 143 MEQ/L (ref 135–144)
TOTAL PROTEIN: 8.6 G/DL (ref 6.3–8)
WBC # BLD: 6.6 K/UL (ref 4.8–10.8)

## 2020-03-16 PROCEDURE — G8417 CALC BMI ABV UP PARAM F/U: HCPCS | Performed by: INTERNAL MEDICINE

## 2020-03-16 PROCEDURE — 1036F TOBACCO NON-USER: CPT | Performed by: INTERNAL MEDICINE

## 2020-03-16 PROCEDURE — G8427 DOCREV CUR MEDS BY ELIG CLIN: HCPCS | Performed by: INTERNAL MEDICINE

## 2020-03-16 PROCEDURE — G8484 FLU IMMUNIZE NO ADMIN: HCPCS | Performed by: INTERNAL MEDICINE

## 2020-03-16 PROCEDURE — 99214 OFFICE O/P EST MOD 30 MIN: CPT | Performed by: INTERNAL MEDICINE

## 2020-03-16 PROCEDURE — 3017F COLORECTAL CA SCREEN DOC REV: CPT | Performed by: INTERNAL MEDICINE

## 2020-03-16 ASSESSMENT — ENCOUNTER SYMPTOMS
ABDOMINAL PAIN: 1
BACK PAIN: 1
DIARRHEA: 1

## 2020-03-16 NOTE — PROGRESS NOTES
Subjective:      Patient ID: Cecil Manzo is a 47 y.o. female. HPI  F/U [facility], cured  Hep B infection  + Cirrhosis by Fibroscan  F2 by Fibrosure    Review of Systems   Cardiovascular: Positive for leg swelling (R leg from surgery). Gastrointestinal: Positive for abdominal pain and diarrhea (X 5-6 DAYS, looks like C diff per patient. ). Musculoskeletal: Positive for arthralgias and back pain. Neurological: Positive for weakness. All other systems reviewed and are negative. was on antibiotics for urethritis by OB GYN, 2 weeks ago  2-3 BM daily  Objective:   Physical Exam  Vitals signs and nursing note reviewed. Constitutional:       General: She is not in acute distress. HENT:      Nose: No congestion. Mouth/Throat:      Pharynx: No oropharyngeal exudate. Eyes:      General: No scleral icterus. Cardiovascular:      Rate and Rhythm: Normal rate and regular rhythm. Pulmonary:      Effort: Pulmonary effort is normal. No respiratory distress. Breath sounds: Normal breath sounds. No wheezing or rhonchi. Abdominal:      General: Abdomen is flat. There is no distension. Palpations: Abdomen is soft. There is no mass. Tenderness: There is no abdominal tenderness. Musculoskeletal:         General: No swelling. Skin:     General: Skin is warm. Coloration: Skin is not jaundiced. Neurological:      General: No focal deficit present. Mental Status: She is oriented to person, place, and time. Cranial Nerves: No cranial nerve deficit. Motor: Weakness (uses a cane) present. Psychiatric:         Mood and Affect: Mood normal.         Behavior: Behavior normal.       Impression       No focal hepatic lesion.  Stable appearance of the liver, with mildly increased echogenicity.       Possible small amount of free fluid in the gallbladder fossa versus non-peristalsing fluid-filled loop of bowel.           Assessment:      F/U [facility], cured  Hep B infection  + Cirrhosis by

## 2020-03-17 DIAGNOSIS — B18.2 CHRONIC HEPATITIS C WITH CIRRHOSIS (HCC): ICD-10-CM

## 2020-03-17 DIAGNOSIS — K74.60 CHRONIC HEPATITIS C WITH CIRRHOSIS (HCC): ICD-10-CM

## 2020-03-17 DIAGNOSIS — R19.7 DIARRHEA, UNSPECIFIED TYPE: ICD-10-CM

## 2020-03-17 DIAGNOSIS — B19.10 HEP B W/O COMA: ICD-10-CM

## 2020-03-18 LAB
C DIFF TOXIN/ANTIGEN: NORMAL
HEPATITIS BE ANTIGEN: NEGATIVE

## 2020-03-19 LAB
AFP: 5.6 UG/L
HBV IU/ML: NOT DETECTED IU/ML
HBV LOG 10 IU/ML: NOT DETECTED LOG IU/ML
INTERPRETATION: NOT DETECTED

## 2020-03-23 ENCOUNTER — TELEPHONE (OUTPATIENT)
Dept: INFECTIOUS DISEASES | Age: 50
End: 2020-03-23

## 2020-03-23 NOTE — TELEPHONE ENCOUNTER
Patient wants to know Results for the Hep-B labs and if any treatment is needed. Will inquire with Dr Briana Davalos.

## 2020-03-25 PROBLEM — K21.9 GERD (GASTROESOPHAGEAL REFLUX DISEASE): Status: RESOLVED | Noted: 2020-03-25 | Resolved: 2020-03-24

## 2020-04-08 RX ORDER — CARVEDILOL 6.25 MG/1
6.25 TABLET ORAL 2 TIMES DAILY WITH MEALS
COMMUNITY

## 2020-04-09 ENCOUNTER — VIRTUAL VISIT (OUTPATIENT)
Dept: CARDIOLOGY CLINIC | Age: 50
End: 2020-04-09
Payer: COMMERCIAL

## 2020-04-09 PROCEDURE — 99441 PR PHYS/QHP TELEPHONE EVALUATION 5-10 MIN: CPT | Performed by: INTERNAL MEDICINE

## 2020-04-09 ASSESSMENT — ENCOUNTER SYMPTOMS
NAUSEA: 0
SHORTNESS OF BREATH: 0
VOMITING: 0
GASTROINTESTINAL NEGATIVE: 1
WHEEZING: 0
ABDOMINAL PAIN: 0
ALLERGIC/IMMUNOLOGIC NEGATIVE: 1
EYES NEGATIVE: 1

## 2020-04-09 NOTE — PROGRESS NOTES
on visible skin    [] Cranial Nerves II-XII grossly intact    [] Motor grossly intact in visible upper extremities    [] Motor grossly intact in visible lower extremities    [x] Normal Mood  [] Anxious appearing    [] Depressed appearing  [] Confused appearing      [] Poor short term memory  [x] Poor long term memory    [] OTHER:      Due to this being a TeleHealth encounter, evaluation of the following organ systems is limited: Vitals/Constitutional/EENT/Resp/CV/GI//MS/Neuro/Skin/Heme-Lymph-Imm. ASSESSMENT:        Diagnosis Orders   1. Essential hypertension       2. Leg pain     PLAN:    Patient was advised and encouraged to check blood pressure at home or at a pharmacy, maintain a logbook, and also call us back if blood pressure are above the target ranges or if it is low. Patient clearly understands and agrees to the instructions. We will need to continue to monitor muscle and liver enzymes, BUN, CR, and electrolytes. Cont with current meds    No further workup needed for PAD. No follow-ups on file. An  electronic signature was used to authenticate this note. --DO Silvia on 4/9/2020 at 2:38 PM  9}    Pursuant to the emergency declaration under the [facility] waiver authority and the [employer], this Virtual  Visit was conducted, with patient's consent, to reduce the patient's risk of exposure to COVID-19 and provide continuity of care for an established patient. Services were provided through a video synchronous discussion virtually to substitute for in-person clinic visit.     Total Virtual Visit time spent: 7 min

## 2020-05-26 ENCOUNTER — VIRTUAL VISIT (OUTPATIENT)
Dept: CARDIOLOGY CLINIC | Age: 50
End: 2020-05-26
Payer: COMMERCIAL

## 2020-05-26 PROCEDURE — 99213 OFFICE O/P EST LOW 20 MIN: CPT | Performed by: INTERNAL MEDICINE

## 2020-05-26 PROCEDURE — 3017F COLORECTAL CA SCREEN DOC REV: CPT | Performed by: INTERNAL MEDICINE

## 2020-05-26 PROCEDURE — G8417 CALC BMI ABV UP PARAM F/U: HCPCS | Performed by: INTERNAL MEDICINE

## 2020-05-26 PROCEDURE — G8428 CUR MEDS NOT DOCUMENT: HCPCS | Performed by: INTERNAL MEDICINE

## 2020-05-26 PROCEDURE — 1036F TOBACCO NON-USER: CPT | Performed by: INTERNAL MEDICINE

## 2020-05-26 ASSESSMENT — ENCOUNTER SYMPTOMS
VOMITING: 0
NAUSEA: 0
ALLERGIC/IMMUNOLOGIC NEGATIVE: 1
GASTROINTESTINAL NEGATIVE: 1
WHEEZING: 0
ABDOMINAL PAIN: 0
SHORTNESS OF BREATH: 0
EYES NEGATIVE: 1

## 2020-05-26 NOTE — PROGRESS NOTES
ulcers. No LE edema. No CHF type symptoms. Lipid profile is normal. No recent hospitalization. No change in meds. Hx of TIA, on ASA. Hx of negative LHC. No smoking. S/p CTA of b/l LE in 9/2019 with no sig. Stenosis. Review of Systems   Constitutional: Negative. Negative for chills and fever. HENT: Negative. Eyes: Negative. Respiratory: Negative for shortness of breath and wheezing. Cardiovascular: Negative for chest pain, palpitations and leg swelling. Gastrointestinal: Negative. Negative for abdominal pain, nausea and vomiting. Endocrine: Negative. Genitourinary: Negative. Musculoskeletal: Negative. Skin: Negative. Negative for rash. Allergic/Immunologic: Negative. Neurological: Negative for dizziness, weakness and headaches. Hematological: Negative. Psychiatric/Behavioral: Negative. Prior to Visit Medications    Medication Sig Taking? Authorizing Provider   gabapentin (NEURONTIN) 800 MG tablet Take 1 tablet by mouth 4 times daily for 30 days. EDDIE Monroe CNP   carvedilol (COREG) 6.25 MG tablet Take 6.25 mg by mouth 2 times daily (with meals)  Historical Provider, MD   Misc. Devices (CANE) MISC 1 Units by Does not apply route once for 1 dose  EDDIE Guzman CNP   NONFORMULARY Ibuprofen 10% ; Amitriptylline 2% ; Lidocaine 4% ; Baclofen 2%  Sig: Apply topically 1-2 grams to the affected area 3-4 times daily. Rub in well for 1-2 minutes. Quantity to Dispense: 120gm  Refills: 5  Historical Provider, MD   aspirin 81 MG tablet Take 81 mg by mouth daily  Historical Provider, MD   atorvastatin (LIPITOR) 40 MG tablet TAKE 1 TABLET BY MOUTH DAILY  EDDIE Welch CNP   glipiZIDE (GLUCOTROL XL) 5 MG extended release tablet Take 1 tablet by mouth daily  EDDIE Welch CNP   zolpidem (AMBIEN) 10 MG tablet Take 1 tablet by mouth nightly as needed. Jacinta Mckenzie   Historical Provider, MD   vitamin D (ERGOCALCIFEROL) 60766 units CAPS capsule TAKE 1 SECTION      CHOLECYSTECTOMY      COLONOSCOPY  04/08/2019    DR BRADFORD Lombardo FINGER TRIGGER RELEASE Bilateral     HYSTERECTOMY, TOTAL ABDOMINAL      KNEE ARTHROSCOPY Bilateral     LUMBAR SPINE SURGERY N/A 8/16/2017    L3-S1 DECOMPRESSION REMOVAL EPIDURAL MASS   performed by Alexandrea Daugherty MD at [address]  1/6/14    DR. FRYE    UPPER GASTROINTESTINAL ENDOSCOPY  3/24/15    [facility]    UPPER GASTROINTESTINAL ENDOSCOPY  04/08/2019    DR FELDER   ,   Family History   Problem Relation Age of Onset    Diabetes Mother     Heart Disease Mother     High Blood Pressure Mother     High Cholesterol Mother     Kidney Disease Mother     Arthritis Mother     Glaucoma Mother     Cataracts Mother     Seizures Mother     Heart Failure Mother     Cancer Father         lung    Diabetes Sister     High Blood Pressure Sister     Liver Disease Sister     Other Sister         flesh eating bacteria    Cancer Sister     High Blood Pressure Brother     Diabetes Brother     Heart Attack Brother     High Cholesterol Brother     Heart Disease Brother     COPD Brother     Heart Failure Brother     Other Brother         gout    Cancer Sister         leukemia    Other Brother         gout, PVD    Diabetes Brother     High Blood Pressure Brother     No Known Problems Son     No Known Problems Daughter        PHYSICAL EXAMINATION:  [ INSTRUCTIONS:  \"[x]\" Indicates a positive item  \"[]\" Indicates a negative item  -- DELETE ALL ITEMS NOT EXAMINED]  [x] Alert  [x] Oriented to person/place/time    [x] No apparent distress  [] Toxic appearing    [] Face flushed appearing [x] Sclera clear  [] Lips are cyanotic      [x] Breathing appears normal  [] Appears tachypneic      [] Rash on visible skin    [] Cranial Nerves II-XII grossly intact    [] Motor grossly intact in visible upper extremities    [] Motor grossly intact in visible lower extremities    [x] Normal Mood  [] Anxious appearing    [] Depressed appearing  [] Confused appearing      [] Poor short term memory  [] Poor long term memory    [] OTHER:      Due to this being a TeleHealth encounter, evaluation of the following organ systems is limited: Vitals/Constitutional/EENT/Resp/CV/GI//MS/Neuro/Skin/Heme-Lymph-Imm. ASSESSMENT:        Diagnosis Orders   1. Mixed hyperlipidemia     2. Essential hypertension     3. Morbid obesity with BMI of 45.0-49.9, adult ([address]       4. Pre op clearance. PLAN:    Patient will need to continue to follow up with you for their general medical care a     As always, aggressive risk factor modification is strongly recommended. We should adhere to the JNC VIII guidelines for HTN management and the NCEP ATP III guidelines for LDL-C management.     Cardiac diet is always recommended with low fat, cholesterol, calories and sodium.     Continue medications at current doses.      Patient is a intermediate risk patient for the proposed procedure/surgery. No active cardiac conditions such as ischemia, CHF or arrhythmias. Recomment peripoperative BBlocker, GI/DVT prophylaxis. Patient was advised and encouraged to check blood pressure at home or at a pharmacy, maintain a logbook, and also call us back if blood pressure are above the target ranges or if it is low. Patient clearly understands and agrees to the instructions.      We will need to continue to monitor muscle and liver enzymes, BUN, CR, and electrolytes.     Details of medical condition explained and patient was warned about adverse consequences of uncontrolled medical conditions and possible side effects of prescribed medications. Return in about 9 months (around 2/26/2021). An  electronic signature was used to authenticate this note.     --[address] DO on 5/26/2020 at 10:57 AM  9}    Pursuant to the emergency declaration under the [facility] waiver authority and the [employer]

## 2020-06-03 RX ORDER — OXYBUTYNIN CHLORIDE 10 MG/1
TABLET, EXTENDED RELEASE ORAL
Qty: 30 TABLET | Refills: 6 | Status: SHIPPED | OUTPATIENT
Start: 2020-06-03 | End: 2020-12-22

## 2020-06-03 RX ORDER — OXYCODONE HYDROCHLORIDE AND ACETAMINOPHEN 5; 325 MG/1; MG/1
TABLET ORAL
COMMUNITY
Start: 2020-05-28 | End: 2020-08-28

## 2020-06-03 RX ORDER — OXYBUTYNIN CHLORIDE 10 MG/1
TABLET, EXTENDED RELEASE ORAL
COMMUNITY
Start: 2020-03-11 | End: 2020-06-03

## 2020-06-03 RX ORDER — POTASSIUM GLUCONATE 595(99)MG
400 TABLET ORAL DAILY
Status: ON HOLD | COMMUNITY
End: 2020-09-04

## 2020-06-03 RX ORDER — LATANOPROST 50 UG/ML
SOLUTION/ DROPS OPHTHALMIC
COMMUNITY
Start: 2020-03-12

## 2020-07-17 ENCOUNTER — VIRTUAL VISIT (OUTPATIENT)
Dept: GASTROENTEROLOGY | Age: 50
End: 2020-07-17
Payer: COMMERCIAL

## 2020-07-17 PROCEDURE — 99212 OFFICE O/P EST SF 10 MIN: CPT | Performed by: SPECIALIST

## 2020-07-17 ASSESSMENT — ENCOUNTER SYMPTOMS
GASTROINTESTINAL NEGATIVE: 1
CONSTIPATION: 0
ABDOMINAL PAIN: 0
ABDOMINAL DISTENTION: 0
NAUSEA: 0
ANAL BLEEDING: 0
EYES NEGATIVE: 1
RECTAL PAIN: 0
DIARRHEA: 0
BLOOD IN STOOL: 0
RESPIRATORY NEGATIVE: 1
VOMITING: 0

## 2020-07-17 NOTE — PROGRESS NOTES
Gastroenterology Clinic Follow up Visit    Den Wray  [id number]  Chief Complaint   Patient presents with    Follow-up     Denied any symptoms, f/u after colonoscopy       HPI and A/P at last visit summarized below: This was a follow-up visit. This was a telephone encounter and patient was told that is a billable service, he was at home and I was in our office. Patient had a colonoscopy in 2018 by Dr. Santosh Marks which showed a hyperplastic polyp, patient feels okay now, no pain no change in bowel habits, no nausea no vomiting, duration of phone call was 10 minutes. Review of Systems   Constitutional: Negative. HENT: Negative. Eyes: Negative. Respiratory: Negative. Cardiovascular: Negative. Gastrointestinal: Negative. Negative for abdominal distention, abdominal pain, anal bleeding, blood in stool, constipation, diarrhea, nausea, rectal pain and vomiting. Endocrine: Negative. Genitourinary: Negative. Musculoskeletal: Negative. Skin: Negative. Allergic/Immunologic: Negative for food allergies. Neurological: Negative. Hematological: Negative. Psychiatric/Behavioral: Negative. Past medical history, past surgical history, medication list, social and familyhistory reviewed    not currently breastfeeding. Physical Exam    Laboratory, Pathology, Radiology reviewed in detail with relevantimportant investigations summarized below:    No results for input(s): WBC, HGB, HCT, MCV, PLT in the last 720 hours. Lab Results   Component Value Date    ALT 17 03/16/2020    AST 20 03/16/2020    ALKPHOS 72 03/16/2020    BILITOT <0.2 03/16/2020     No results found. Endoscopic investigations:     Assessment and Plan:  Den Wray 45 y.o. female for follow up. History of colon polyp and the most recent colonoscopy showed hyperplastic polyp, patient is due for surveillance colonoscopy in 2023. Diagnosis Orders   1.  History of colon polyps         No follow-ups on file.    Sarah Wells MD   [facility]GastroenterDe[facility]    Please note this report has been partially produced using speech recognitionsoftware  and may cause contain errors related to that system including grammar, punctuation and spelling as well as words andphrases that may seem inappropriate. If there are questions or concerns please feel free to contact me to clarify.

## 2020-08-28 ENCOUNTER — HOSPITAL ENCOUNTER (OUTPATIENT)
Dept: PREADMISSION TESTING | Age: 50
Discharge: HOME | End: 2020-09-01
Payer: COMMERCIAL

## 2020-08-28 ENCOUNTER — NURSE ONLY (OUTPATIENT)
Dept: PRIMARY CARE CLINIC | Age: 50
End: 2020-08-28

## 2020-08-28 VITALS
DIASTOLIC BLOOD PRESSURE: 56 MMHG | HEART RATE: 108 BPM | RESPIRATION RATE: 16 BRPM | HEIGHT: 61 IN | WEIGHT: 241.38 LBS | OXYGEN SATURATION: 95 % | SYSTOLIC BLOOD PRESSURE: 112 MMHG | TEMPERATURE: 97.7 F | BODY MASS INDEX: 45.57 KG/M2

## 2020-08-28 LAB
ANION GAP SERPL CALCULATED.3IONS-SCNC: 6 MEQ/L (ref 9–15)
BILIRUBIN URINE: NEGATIVE
BLOOD, URINE: NEGATIVE
BUN BLDV-MCNC: 15 MG/DL (ref 6–20)
CALCIUM SERPL-MCNC: 8.5 MG/DL (ref 8.5–9.9)
CHLORIDE BLD-SCNC: 109 MEQ/L (ref 95–107)
CLARITY: CLEAR
CO2: 27 MEQ/L (ref 20–31)
COLOR: YELLOW
CREAT SERPL-MCNC: 0.77 MG/DL (ref 0.5–0.9)
EKG ATRIAL RATE: 76 BPM
EKG P AXIS: 53 DEGREES
EKG P-R INTERVAL: 180 MS
EKG Q-T INTERVAL: 388 MS
EKG QRS DURATION: 82 MS
EKG QTC CALCULATION (BAZETT): 436 MS
EKG R AXIS: 52 DEGREES
EKG T AXIS: 48 DEGREES
EKG VENTRICULAR RATE: 76 BPM
GFR AFRICAN AMERICAN: >60
GFR NON-AFRICAN AMERICAN: >60
GLUCOSE BLD-MCNC: 85 MG/DL (ref 70–99)
GLUCOSE URINE: NEGATIVE MG/DL
HCT VFR BLD CALC: 36.8 % (ref 37–47)
HEMOGLOBIN: 11.7 G/DL (ref 12–16)
KETONES, URINE: NEGATIVE MG/DL
LEUKOCYTE ESTERASE, URINE: NEGATIVE
MCH RBC QN AUTO: 27.6 PG (ref 27–31.3)
MCHC RBC AUTO-ENTMCNC: 31.6 % (ref 33–37)
MCV RBC AUTO: 87.3 FL (ref 82–100)
NITRITE, URINE: NEGATIVE
PDW BLD-RTO: 15.1 % (ref 11.5–14.5)
PH UA: 5 (ref 5–9)
PLATELET # BLD: 177 K/UL (ref 130–400)
POTASSIUM SERPL-SCNC: 3.7 MEQ/L (ref 3.4–4.9)
PROTEIN UA: NEGATIVE MG/DL
RBC # BLD: 4.22 M/UL (ref 4.2–5.4)
SODIUM BLD-SCNC: 142 MEQ/L (ref 135–144)
SPECIFIC GRAVITY UA: 1.01 (ref 1–1.03)
URINE REFLEX TO CULTURE: NORMAL
UROBILINOGEN, URINE: 0.2 E.U./DL
WBC # BLD: 6.9 K/UL (ref 4.8–10.8)

## 2020-08-28 PROCEDURE — 80048 BASIC METABOLIC PNL TOTAL CA: CPT

## 2020-08-28 PROCEDURE — U0003 INFECTIOUS AGENT DETECTION BY NUCLEIC ACID (DNA OR RNA); SEVERE ACUTE RESPIRATORY SYNDROME CORONAVIRUS 2 (SARS-COV-2) (CORONAVIRUS DISEASE [COVID-19]), AMPLIFIED PROBE TECHNIQUE, MAKING USE OF HIGH THROUGHPUT TECHNOLOGIES AS DESCRIBED BY CMS-2020-01-R: HCPCS

## 2020-08-28 PROCEDURE — 86900 BLOOD TYPING SEROLOGIC ABO: CPT

## 2020-08-28 PROCEDURE — 86901 BLOOD TYPING SEROLOGIC RH(D): CPT

## 2020-08-28 PROCEDURE — 85027 COMPLETE CBC AUTOMATED: CPT

## 2020-08-28 PROCEDURE — 93010 ELECTROCARDIOGRAM REPORT: CPT | Performed by: INTERNAL MEDICINE

## 2020-08-28 PROCEDURE — 93005 ELECTROCARDIOGRAM TRACING: CPT | Performed by: ORTHOPAEDIC SURGERY

## 2020-08-28 PROCEDURE — 86850 RBC ANTIBODY SCREEN: CPT

## 2020-08-28 PROCEDURE — 81003 URINALYSIS AUTO W/O SCOPE: CPT

## 2020-08-28 RX ORDER — SODIUM CHLORIDE 0.9 % (FLUSH) 0.9 %
10 SYRINGE (ML) INJECTION EVERY 12 HOURS SCHEDULED
Status: CANCELLED | OUTPATIENT
Start: 2020-09-04

## 2020-08-28 RX ORDER — SODIUM CHLORIDE, SODIUM LACTATE, POTASSIUM CHLORIDE, CALCIUM CHLORIDE 600; 310; 30; 20 MG/100ML; MG/100ML; MG/100ML; MG/100ML
INJECTION, SOLUTION INTRAVENOUS CONTINUOUS
Status: CANCELLED | OUTPATIENT
Start: 2020-09-04

## 2020-08-28 RX ORDER — LIDOCAINE HYDROCHLORIDE 10 MG/ML
1 INJECTION, SOLUTION EPIDURAL; INFILTRATION; INTRACAUDAL; PERINEURAL
Status: CANCELLED | OUTPATIENT
Start: 2020-09-04 | End: 2020-09-04

## 2020-08-28 RX ORDER — SODIUM CHLORIDE 0.9 % (FLUSH) 0.9 %
10 SYRINGE (ML) INJECTION PRN
Status: CANCELLED | OUTPATIENT
Start: 2020-09-04

## 2020-08-28 RX ORDER — CEFAZOLIN SODIUM 2 G/50ML
2 SOLUTION INTRAVENOUS ONCE
Status: CANCELLED | OUTPATIENT
Start: 2020-09-04

## 2020-08-29 LAB
ABO/RH: NORMAL
ANTIBODY SCREEN: NORMAL

## 2020-08-30 LAB
SARS-COV-2: NOT DETECTED
SOURCE: NORMAL

## 2020-09-04 ENCOUNTER — ANESTHESIA EVENT (OUTPATIENT)
Dept: OPERATING ROOM | Age: 50
DRG: 302 | End: 2020-09-04
Payer: COMMERCIAL

## 2020-09-04 ENCOUNTER — ANESTHESIA (OUTPATIENT)
Dept: OPERATING ROOM | Age: 50
DRG: 302 | End: 2020-09-04
Payer: COMMERCIAL

## 2020-09-04 ENCOUNTER — HOSPITAL ENCOUNTER (INPATIENT)
Age: 50
LOS: 1 days | Discharge: HOME HEALTH | DRG: 302 | End: 2020-09-05
Attending: ORTHOPAEDIC SURGERY | Admitting: ORTHOPAEDIC SURGERY
Payer: COMMERCIAL

## 2020-09-04 ENCOUNTER — APPOINTMENT (OUTPATIENT)
Dept: GENERAL RADIOLOGY | Age: 50
DRG: 302 | End: 2020-09-04
Attending: ORTHOPAEDIC SURGERY
Payer: COMMERCIAL

## 2020-09-04 VITALS — TEMPERATURE: 97 F | SYSTOLIC BLOOD PRESSURE: 164 MMHG | DIASTOLIC BLOOD PRESSURE: 87 MMHG | OXYGEN SATURATION: 99 %

## 2020-09-04 PROBLEM — Z96.652 STATUS POST REVISION OF TOTAL KNEE REPLACEMENT, LEFT: Status: ACTIVE | Noted: 2020-09-04

## 2020-09-04 LAB
GLUCOSE BLD-MCNC: 121 MG/DL (ref 60–115)
GLUCOSE BLD-MCNC: 137 MG/DL (ref 60–115)
GLUCOSE BLD-MCNC: 98 MG/DL (ref 60–115)
GRAM STAIN RESULT: NORMAL
PERFORMED ON: ABNORMAL
PERFORMED ON: ABNORMAL
PERFORMED ON: NORMAL

## 2020-09-04 PROCEDURE — 2580000003 HC RX 258: Performed by: ORTHOPAEDIC SURGERY

## 2020-09-04 PROCEDURE — 6370000000 HC RX 637 (ALT 250 FOR IP): Performed by: NURSE PRACTITIONER

## 2020-09-04 PROCEDURE — C1776 JOINT DEVICE (IMPLANTABLE): HCPCS | Performed by: ORTHOPAEDIC SURGERY

## 2020-09-04 PROCEDURE — 73560 X-RAY EXAM OF KNEE 1 OR 2: CPT

## 2020-09-04 PROCEDURE — 6370000000 HC RX 637 (ALT 250 FOR IP): Performed by: ORTHOPAEDIC SURGERY

## 2020-09-04 PROCEDURE — 87205 SMEAR GRAM STAIN: CPT

## 2020-09-04 PROCEDURE — 3600000014 HC SURGERY LEVEL 4 ADDTL 15MIN: Performed by: ORTHOPAEDIC SURGERY

## 2020-09-04 PROCEDURE — 3600000004 HC SURGERY LEVEL 4 BASE: Performed by: ORTHOPAEDIC SURGERY

## 2020-09-04 PROCEDURE — 2580000003 HC RX 258: Performed by: NURSE PRACTITIONER

## 2020-09-04 PROCEDURE — 64447 NJX AA&/STRD FEMORAL NRV IMG: CPT | Performed by: NURSE ANESTHETIST, CERTIFIED REGISTERED

## 2020-09-04 PROCEDURE — 7100000001 HC PACU RECOVERY - ADDTL 15 MIN: Performed by: ORTHOPAEDIC SURGERY

## 2020-09-04 PROCEDURE — 94761 N-INVAS EAR/PLS OXIMETRY MLT: CPT

## 2020-09-04 PROCEDURE — 1210000000 HC MED SURG R&B

## 2020-09-04 PROCEDURE — 3700000000 HC ANESTHESIA ATTENDED CARE: Performed by: ORTHOPAEDIC SURGERY

## 2020-09-04 PROCEDURE — 0SUW09Z SUPPLEMENT LEFT KNEE JOINT, TIBIAL SURFACE WITH LINER, OPEN APPROACH: ICD-10-PCS | Performed by: ORTHOPAEDIC SURGERY

## 2020-09-04 PROCEDURE — 6360000002 HC RX W HCPCS: Performed by: ORTHOPAEDIC SURGERY

## 2020-09-04 PROCEDURE — 87070 CULTURE OTHR SPECIMN AEROBIC: CPT

## 2020-09-04 PROCEDURE — 7100000000 HC PACU RECOVERY - FIRST 15 MIN: Performed by: ORTHOPAEDIC SURGERY

## 2020-09-04 PROCEDURE — 6360000002 HC RX W HCPCS: Performed by: NURSE PRACTITIONER

## 2020-09-04 PROCEDURE — 6360000002 HC RX W HCPCS: Performed by: ANESTHESIOLOGY

## 2020-09-04 PROCEDURE — 0SPD09Z REMOVAL OF LINER FROM LEFT KNEE JOINT, OPEN APPROACH: ICD-10-PCS | Performed by: ORTHOPAEDIC SURGERY

## 2020-09-04 PROCEDURE — 2709999900 HC NON-CHARGEABLE SUPPLY: Performed by: ORTHOPAEDIC SURGERY

## 2020-09-04 PROCEDURE — 3700000001 HC ADD 15 MINUTES (ANESTHESIA): Performed by: ORTHOPAEDIC SURGERY

## 2020-09-04 PROCEDURE — 6360000002 HC RX W HCPCS: Performed by: NURSE ANESTHETIST, CERTIFIED REGISTERED

## 2020-09-04 PROCEDURE — 94640 AIRWAY INHALATION TREATMENT: CPT

## 2020-09-04 PROCEDURE — 87075 CULTR BACTERIA EXCEPT BLOOD: CPT

## 2020-09-04 PROCEDURE — 94150 VITAL CAPACITY TEST: CPT

## 2020-09-04 PROCEDURE — 2500000003 HC RX 250 WO HCPCS: Performed by: NURSE ANESTHETIST, CERTIFIED REGISTERED

## 2020-09-04 DEVICE — IMPLANTABLE DEVICE: Type: IMPLANTABLE DEVICE | Site: KNEE | Status: FUNCTIONAL

## 2020-09-04 RX ORDER — ROCURONIUM BROMIDE 10 MG/ML
INJECTION, SOLUTION INTRAVENOUS PRN
Status: DISCONTINUED | OUTPATIENT
Start: 2020-09-04 | End: 2020-09-04 | Stop reason: SDUPTHER

## 2020-09-04 RX ORDER — ONDANSETRON 2 MG/ML
4 INJECTION INTRAMUSCULAR; INTRAVENOUS EVERY 6 HOURS PRN
Status: DISCONTINUED | OUTPATIENT
Start: 2020-09-04 | End: 2020-09-05 | Stop reason: HOSPADM

## 2020-09-04 RX ORDER — NICOTINE POLACRILEX 4 MG
15 LOZENGE BUCCAL PRN
Status: DISCONTINUED | OUTPATIENT
Start: 2020-09-04 | End: 2020-09-05 | Stop reason: HOSPADM

## 2020-09-04 RX ORDER — KETOROLAC TROMETHAMINE 30 MG/ML
30 INJECTION, SOLUTION INTRAMUSCULAR; INTRAVENOUS EVERY 6 HOURS PRN
Status: DISPENSED | OUTPATIENT
Start: 2020-09-04 | End: 2020-09-05

## 2020-09-04 RX ORDER — CEFAZOLIN SODIUM 2 G/50ML
2 SOLUTION INTRAVENOUS
Status: COMPLETED | OUTPATIENT
Start: 2020-09-04 | End: 2020-09-04

## 2020-09-04 RX ORDER — TRANEXAMIC ACID 650 1/1
1950 TABLET ORAL
Status: DISCONTINUED | OUTPATIENT
Start: 2020-09-04 | End: 2020-09-04 | Stop reason: HOSPADM

## 2020-09-04 RX ORDER — GLIPIZIDE 5 MG/1
5 TABLET ORAL
Status: DISCONTINUED | OUTPATIENT
Start: 2020-09-05 | End: 2020-09-05 | Stop reason: HOSPADM

## 2020-09-04 RX ORDER — OXYCODONE HCL 10 MG/1
10 TABLET, FILM COATED, EXTENDED RELEASE ORAL ONCE
Status: COMPLETED | OUTPATIENT
Start: 2020-09-04 | End: 2020-09-04

## 2020-09-04 RX ORDER — MORPHINE SULFATE 2 MG/ML
2 INJECTION, SOLUTION INTRAMUSCULAR; INTRAVENOUS
Status: DISCONTINUED | OUTPATIENT
Start: 2020-09-04 | End: 2020-09-05 | Stop reason: HOSPADM

## 2020-09-04 RX ORDER — MEPERIDINE HYDROCHLORIDE 25 MG/ML
12.5 INJECTION INTRAMUSCULAR; INTRAVENOUS; SUBCUTANEOUS EVERY 5 MIN PRN
Status: DISCONTINUED | OUTPATIENT
Start: 2020-09-04 | End: 2020-09-04 | Stop reason: HOSPADM

## 2020-09-04 RX ORDER — GABAPENTIN 400 MG/1
800 CAPSULE ORAL 4 TIMES DAILY
Status: DISCONTINUED | OUTPATIENT
Start: 2020-09-04 | End: 2020-09-05 | Stop reason: HOSPADM

## 2020-09-04 RX ORDER — FUROSEMIDE 40 MG/1
40 TABLET ORAL DAILY
Status: DISCONTINUED | OUTPATIENT
Start: 2020-09-05 | End: 2020-09-05 | Stop reason: HOSPADM

## 2020-09-04 RX ORDER — CARVEDILOL 6.25 MG/1
6.25 TABLET ORAL 2 TIMES DAILY WITH MEALS
Status: DISCONTINUED | OUTPATIENT
Start: 2020-09-04 | End: 2020-09-05 | Stop reason: HOSPADM

## 2020-09-04 RX ORDER — OXYCODONE HYDROCHLORIDE 5 MG/1
5 TABLET ORAL EVERY 4 HOURS PRN
Status: DISCONTINUED | OUTPATIENT
Start: 2020-09-04 | End: 2020-09-05 | Stop reason: HOSPADM

## 2020-09-04 RX ORDER — LIDOCAINE HYDROCHLORIDE 10 MG/ML
1 INJECTION, SOLUTION EPIDURAL; INFILTRATION; INTRACAUDAL; PERINEURAL
Status: DISCONTINUED | OUTPATIENT
Start: 2020-09-04 | End: 2020-09-04 | Stop reason: HOSPADM

## 2020-09-04 RX ORDER — MIDAZOLAM HYDROCHLORIDE 1 MG/ML
INJECTION INTRAMUSCULAR; INTRAVENOUS PRN
Status: DISCONTINUED | OUTPATIENT
Start: 2020-09-04 | End: 2020-09-04 | Stop reason: SDUPTHER

## 2020-09-04 RX ORDER — PROPOFOL 10 MG/ML
INJECTION, EMULSION INTRAVENOUS PRN
Status: DISCONTINUED | OUTPATIENT
Start: 2020-09-04 | End: 2020-09-04 | Stop reason: SDUPTHER

## 2020-09-04 RX ORDER — ATORVASTATIN CALCIUM 40 MG/1
40 TABLET, FILM COATED ORAL DAILY
Status: DISCONTINUED | OUTPATIENT
Start: 2020-09-04 | End: 2020-09-05 | Stop reason: HOSPADM

## 2020-09-04 RX ORDER — FENTANYL CITRATE 50 UG/ML
50 INJECTION, SOLUTION INTRAMUSCULAR; INTRAVENOUS EVERY 10 MIN PRN
Status: DISCONTINUED | OUTPATIENT
Start: 2020-09-04 | End: 2020-09-04 | Stop reason: HOSPADM

## 2020-09-04 RX ORDER — SUCCINYLCHOLINE/SOD CL,ISO/PF 100 MG/5ML
SYRINGE (ML) INTRAVENOUS PRN
Status: DISCONTINUED | OUTPATIENT
Start: 2020-09-04 | End: 2020-09-04 | Stop reason: SDUPTHER

## 2020-09-04 RX ORDER — DIPHENHYDRAMINE HYDROCHLORIDE 50 MG/ML
12.5 INJECTION INTRAMUSCULAR; INTRAVENOUS
Status: DISCONTINUED | OUTPATIENT
Start: 2020-09-04 | End: 2020-09-04 | Stop reason: HOSPADM

## 2020-09-04 RX ORDER — SODIUM CHLORIDE 0.9 % (FLUSH) 0.9 %
10 SYRINGE (ML) INJECTION EVERY 12 HOURS SCHEDULED
Status: DISCONTINUED | OUTPATIENT
Start: 2020-09-04 | End: 2020-09-04 | Stop reason: HOSPADM

## 2020-09-04 RX ORDER — NORTRIPTYLINE HYDROCHLORIDE 10 MG/1
10 CAPSULE ORAL NIGHTLY
Status: DISCONTINUED | OUTPATIENT
Start: 2020-09-04 | End: 2020-09-05 | Stop reason: HOSPADM

## 2020-09-04 RX ORDER — ROPIVACAINE HYDROCHLORIDE 5 MG/ML
INJECTION, SOLUTION EPIDURAL; INFILTRATION; PERINEURAL PRN
Status: DISCONTINUED | OUTPATIENT
Start: 2020-09-04 | End: 2020-09-04 | Stop reason: SDUPTHER

## 2020-09-04 RX ORDER — INSULIN GLARGINE 100 [IU]/ML
40 INJECTION, SOLUTION SUBCUTANEOUS NIGHTLY
Status: DISCONTINUED | OUTPATIENT
Start: 2020-09-04 | End: 2020-09-05 | Stop reason: HOSPADM

## 2020-09-04 RX ORDER — CHOLECALCIFEROL (VITAMIN D3) 125 MCG
1000 CAPSULE ORAL DAILY
Status: DISCONTINUED | OUTPATIENT
Start: 2020-09-04 | End: 2020-09-05 | Stop reason: HOSPADM

## 2020-09-04 RX ORDER — MAGNESIUM HYDROXIDE 1200 MG/15ML
LIQUID ORAL CONTINUOUS PRN
Status: COMPLETED | OUTPATIENT
Start: 2020-09-04 | End: 2020-09-04

## 2020-09-04 RX ORDER — SODIUM CHLORIDE, SODIUM LACTATE, POTASSIUM CHLORIDE, CALCIUM CHLORIDE 600; 310; 30; 20 MG/100ML; MG/100ML; MG/100ML; MG/100ML
INJECTION, SOLUTION INTRAVENOUS CONTINUOUS
Status: DISCONTINUED | OUTPATIENT
Start: 2020-09-04 | End: 2020-09-04

## 2020-09-04 RX ORDER — DEXTROSE MONOHYDRATE 50 MG/ML
100 INJECTION, SOLUTION INTRAVENOUS PRN
Status: DISCONTINUED | OUTPATIENT
Start: 2020-09-04 | End: 2020-09-05 | Stop reason: HOSPADM

## 2020-09-04 RX ORDER — ACETAMINOPHEN 325 MG/1
650 TABLET ORAL EVERY 6 HOURS
Status: DISCONTINUED | OUTPATIENT
Start: 2020-09-04 | End: 2020-09-05 | Stop reason: HOSPADM

## 2020-09-04 RX ORDER — DIAZEPAM 5 MG/1
5 TABLET ORAL EVERY 6 HOURS PRN
Status: DISCONTINUED | OUTPATIENT
Start: 2020-09-04 | End: 2020-09-05 | Stop reason: HOSPADM

## 2020-09-04 RX ORDER — AMOXICILLIN 250 MG
1 CAPSULE ORAL 2 TIMES DAILY
Status: DISCONTINUED | OUTPATIENT
Start: 2020-09-04 | End: 2020-09-05 | Stop reason: HOSPADM

## 2020-09-04 RX ORDER — ONDANSETRON 2 MG/ML
INJECTION INTRAMUSCULAR; INTRAVENOUS PRN
Status: DISCONTINUED | OUTPATIENT
Start: 2020-09-04 | End: 2020-09-04 | Stop reason: SDUPTHER

## 2020-09-04 RX ORDER — SODIUM CHLORIDE 0.9 % (FLUSH) 0.9 %
10 SYRINGE (ML) INJECTION PRN
Status: DISCONTINUED | OUTPATIENT
Start: 2020-09-04 | End: 2020-09-05 | Stop reason: HOSPADM

## 2020-09-04 RX ORDER — POTASSIUM CHLORIDE 20 MEQ/1
20 TABLET, EXTENDED RELEASE ORAL 2 TIMES DAILY
Status: DISCONTINUED | OUTPATIENT
Start: 2020-09-04 | End: 2020-09-05 | Stop reason: HOSPADM

## 2020-09-04 RX ORDER — BUDESONIDE AND FORMOTEROL FUMARATE DIHYDRATE 80; 4.5 UG/1; UG/1
2 AEROSOL RESPIRATORY (INHALATION) 2 TIMES DAILY
Status: DISCONTINUED | OUTPATIENT
Start: 2020-09-04 | End: 2020-09-05 | Stop reason: HOSPADM

## 2020-09-04 RX ORDER — CELECOXIB 200 MG/1
400 CAPSULE ORAL ONCE
Status: COMPLETED | OUTPATIENT
Start: 2020-09-04 | End: 2020-09-04

## 2020-09-04 RX ORDER — ONDANSETRON 2 MG/ML
4 INJECTION INTRAMUSCULAR; INTRAVENOUS
Status: DISCONTINUED | OUTPATIENT
Start: 2020-09-04 | End: 2020-09-04 | Stop reason: HOSPADM

## 2020-09-04 RX ORDER — HYDROCODONE BITARTRATE AND ACETAMINOPHEN 5; 325 MG/1; MG/1
1 TABLET ORAL PRN
Status: DISCONTINUED | OUTPATIENT
Start: 2020-09-04 | End: 2020-09-04 | Stop reason: HOSPADM

## 2020-09-04 RX ORDER — PANTOPRAZOLE SODIUM 40 MG/1
40 TABLET, DELAYED RELEASE ORAL
Status: DISCONTINUED | OUTPATIENT
Start: 2020-09-05 | End: 2020-09-05 | Stop reason: HOSPADM

## 2020-09-04 RX ORDER — LATANOPROST 50 UG/ML
1 SOLUTION/ DROPS OPHTHALMIC NIGHTLY
Status: DISCONTINUED | OUTPATIENT
Start: 2020-09-04 | End: 2020-09-05 | Stop reason: HOSPADM

## 2020-09-04 RX ORDER — DEXTROSE MONOHYDRATE 25 G/50ML
12.5 INJECTION, SOLUTION INTRAVENOUS PRN
Status: DISCONTINUED | OUTPATIENT
Start: 2020-09-04 | End: 2020-09-05 | Stop reason: HOSPADM

## 2020-09-04 RX ORDER — IRON POLYSACCHARIDE COMPLEX 150 MG
1 CAPSULE ORAL DAILY
Status: DISCONTINUED | OUTPATIENT
Start: 2020-09-04 | End: 2020-09-05 | Stop reason: HOSPADM

## 2020-09-04 RX ORDER — ACETAMINOPHEN 500 MG
1000 TABLET ORAL ONCE
Status: COMPLETED | OUTPATIENT
Start: 2020-09-04 | End: 2020-09-04

## 2020-09-04 RX ORDER — CEFAZOLIN SODIUM 2 G/50ML
2 SOLUTION INTRAVENOUS EVERY 8 HOURS
Status: COMPLETED | OUTPATIENT
Start: 2020-09-04 | End: 2020-09-05

## 2020-09-04 RX ORDER — FENTANYL CITRATE 50 UG/ML
INJECTION, SOLUTION INTRAMUSCULAR; INTRAVENOUS PRN
Status: DISCONTINUED | OUTPATIENT
Start: 2020-09-04 | End: 2020-09-04 | Stop reason: SDUPTHER

## 2020-09-04 RX ORDER — LIDOCAINE HYDROCHLORIDE 20 MG/ML
INJECTION, SOLUTION INTRAVENOUS PRN
Status: DISCONTINUED | OUTPATIENT
Start: 2020-09-04 | End: 2020-09-04 | Stop reason: SDUPTHER

## 2020-09-04 RX ORDER — ONDANSETRON 4 MG/1
4 TABLET, ORALLY DISINTEGRATING ORAL EVERY 8 HOURS PRN
Status: DISCONTINUED | OUTPATIENT
Start: 2020-09-04 | End: 2020-09-05 | Stop reason: HOSPADM

## 2020-09-04 RX ORDER — TRANEXAMIC ACID 650 1/1
1950 TABLET ORAL EVERY 8 HOURS
Status: COMPLETED | OUTPATIENT
Start: 2020-09-04 | End: 2020-09-04

## 2020-09-04 RX ORDER — SODIUM CHLORIDE 0.9 % (FLUSH) 0.9 %
10 SYRINGE (ML) INJECTION EVERY 12 HOURS SCHEDULED
Status: DISCONTINUED | OUTPATIENT
Start: 2020-09-04 | End: 2020-09-05 | Stop reason: HOSPADM

## 2020-09-04 RX ORDER — SODIUM CHLORIDE, SODIUM LACTATE, POTASSIUM CHLORIDE, CALCIUM CHLORIDE 600; 310; 30; 20 MG/100ML; MG/100ML; MG/100ML; MG/100ML
INJECTION, SOLUTION INTRAVENOUS CONTINUOUS
Status: DISPENSED | OUTPATIENT
Start: 2020-09-04 | End: 2020-09-05

## 2020-09-04 RX ORDER — SODIUM CHLORIDE 0.9 % (FLUSH) 0.9 %
10 SYRINGE (ML) INJECTION PRN
Status: DISCONTINUED | OUTPATIENT
Start: 2020-09-04 | End: 2020-09-04 | Stop reason: HOSPADM

## 2020-09-04 RX ORDER — TRANEXAMIC ACID 650 1/1
1950 TABLET ORAL ONCE
Status: COMPLETED | OUTPATIENT
Start: 2020-09-04 | End: 2020-09-05

## 2020-09-04 RX ORDER — HYDROCODONE BITARTRATE AND ACETAMINOPHEN 5; 325 MG/1; MG/1
2 TABLET ORAL PRN
Status: DISCONTINUED | OUTPATIENT
Start: 2020-09-04 | End: 2020-09-04 | Stop reason: HOSPADM

## 2020-09-04 RX ORDER — MAGNESIUM HYDROXIDE 1200 MG/15ML
LIQUID ORAL PRN
Status: DISCONTINUED | OUTPATIENT
Start: 2020-09-04 | End: 2020-09-04 | Stop reason: ALTCHOICE

## 2020-09-04 RX ORDER — METOCLOPRAMIDE HYDROCHLORIDE 5 MG/ML
10 INJECTION INTRAMUSCULAR; INTRAVENOUS
Status: DISCONTINUED | OUTPATIENT
Start: 2020-09-04 | End: 2020-09-04 | Stop reason: HOSPADM

## 2020-09-04 RX ORDER — ASPIRIN 81 MG/1
81 TABLET ORAL 2 TIMES DAILY
Status: DISCONTINUED | OUTPATIENT
Start: 2020-09-04 | End: 2020-09-05 | Stop reason: HOSPADM

## 2020-09-04 RX ORDER — OXYCODONE HYDROCHLORIDE 5 MG/1
10 TABLET ORAL EVERY 4 HOURS PRN
Status: DISCONTINUED | OUTPATIENT
Start: 2020-09-04 | End: 2020-09-05 | Stop reason: HOSPADM

## 2020-09-04 RX ADMIN — KETOROLAC TROMETHAMINE 30 MG: 30 INJECTION, SOLUTION INTRAMUSCULAR at 20:19

## 2020-09-04 RX ADMIN — PHENYLEPHRINE HYDROCHLORIDE 100 MCG: 10 INJECTION INTRAVENOUS at 14:01

## 2020-09-04 RX ADMIN — CEFAZOLIN SODIUM 2 G: 2 SOLUTION INTRAVENOUS at 13:48

## 2020-09-04 RX ADMIN — LIDOCAINE HYDROCHLORIDE 50 MG: 20 INJECTION, SOLUTION INTRAVENOUS at 13:44

## 2020-09-04 RX ADMIN — FENTANYL CITRATE 50 MCG: 50 INJECTION, SOLUTION INTRAMUSCULAR; INTRAVENOUS at 14:14

## 2020-09-04 RX ADMIN — ASPIRIN 81 MG: 81 TABLET, COATED ORAL at 20:20

## 2020-09-04 RX ADMIN — ATORVASTATIN CALCIUM 40 MG: 40 TABLET, FILM COATED ORAL at 20:19

## 2020-09-04 RX ADMIN — CEFAZOLIN SODIUM 2 G: 2 SOLUTION INTRAVENOUS at 20:22

## 2020-09-04 RX ADMIN — ROCURONIUM BROMIDE 20 MG: 10 INJECTION INTRAVENOUS at 14:01

## 2020-09-04 RX ADMIN — ONDANSETRON 4 MG: 2 INJECTION INTRAMUSCULAR; INTRAVENOUS at 14:46

## 2020-09-04 RX ADMIN — CYANOCOBALAMIN TAB 500 MCG 1000 MCG: 500 TAB at 20:20

## 2020-09-04 RX ADMIN — CARVEDILOL 6.25 MG: 6.25 TABLET, FILM COATED ORAL at 20:20

## 2020-09-04 RX ADMIN — SUGAMMADEX 200 MG: 100 INJECTION, SOLUTION INTRAVENOUS at 15:24

## 2020-09-04 RX ADMIN — LATANOPROST 1 DROP: 50 SOLUTION OPHTHALMIC at 21:45

## 2020-09-04 RX ADMIN — FENTANYL CITRATE 50 MCG: 50 INJECTION, SOLUTION INTRAMUSCULAR; INTRAVENOUS at 13:44

## 2020-09-04 RX ADMIN — ACETAMINOPHEN 650 MG: 325 TABLET, FILM COATED ORAL at 20:19

## 2020-09-04 RX ADMIN — TOPIRAMATE 200 MG: 200 TABLET, FILM COATED ORAL at 21:45

## 2020-09-04 RX ADMIN — PROPOFOL 200 MG: 10 INJECTION, EMULSION INTRAVENOUS at 13:44

## 2020-09-04 RX ADMIN — MIDAZOLAM HYDROCHLORIDE 4 MG: 2 INJECTION, SOLUTION INTRAMUSCULAR; INTRAVENOUS at 13:20

## 2020-09-04 RX ADMIN — FENTANYL CITRATE 50 MCG: 50 INJECTION, SOLUTION INTRAMUSCULAR; INTRAVENOUS at 14:23

## 2020-09-04 RX ADMIN — FENTANYL CITRATE 50 MCG: 50 INJECTION, SOLUTION INTRAMUSCULAR; INTRAVENOUS at 14:43

## 2020-09-04 RX ADMIN — SODIUM CHLORIDE, POTASSIUM CHLORIDE, SODIUM LACTATE AND CALCIUM CHLORIDE: 600; 310; 30; 20 INJECTION, SOLUTION INTRAVENOUS at 14:16

## 2020-09-04 RX ADMIN — BUDESONIDE AND FORMOTEROL FUMARATE DIHYDRATE 2 PUFF: 80; 4.5 AEROSOL RESPIRATORY (INHALATION) at 19:38

## 2020-09-04 RX ADMIN — SODIUM CHLORIDE, POTASSIUM CHLORIDE, SODIUM LACTATE AND CALCIUM CHLORIDE: 600; 310; 30; 20 INJECTION, SOLUTION INTRAVENOUS at 20:22

## 2020-09-04 RX ADMIN — INSULIN GLARGINE 40 UNITS: 100 INJECTION, SOLUTION SUBCUTANEOUS at 21:46

## 2020-09-04 RX ADMIN — ROCURONIUM BROMIDE 50 MG: 10 INJECTION INTRAVENOUS at 13:48

## 2020-09-04 RX ADMIN — OXYCODONE HYDROCHLORIDE 10 MG: 10 TABLET, FILM COATED, EXTENDED RELEASE ORAL at 11:10

## 2020-09-04 RX ADMIN — METFORMIN HYDROCHLORIDE 1000 MG: 500 TABLET ORAL at 20:19

## 2020-09-04 RX ADMIN — TRANEXAMIC ACID 1950 MG: 650 TABLET, FILM COATED ORAL at 20:19

## 2020-09-04 RX ADMIN — POTASSIUM CHLORIDE 20 MEQ: 20 TABLET, EXTENDED RELEASE ORAL at 20:20

## 2020-09-04 RX ADMIN — DIAZEPAM 5 MG: 5 TABLET ORAL at 20:19

## 2020-09-04 RX ADMIN — DOCUSATE SODIUM 50 MG AND SENNOSIDES 8.6 MG 1 TABLET: 8.6; 5 TABLET, FILM COATED ORAL at 20:19

## 2020-09-04 RX ADMIN — OXYCODONE HYDROCHLORIDE 10 MG: 5 TABLET ORAL at 22:30

## 2020-09-04 RX ADMIN — SODIUM CHLORIDE, POTASSIUM CHLORIDE, SODIUM LACTATE AND CALCIUM CHLORIDE 1000 ML: 600; 310; 30; 20 INJECTION, SOLUTION INTRAVENOUS at 11:49

## 2020-09-04 RX ADMIN — PHENYLEPHRINE HYDROCHLORIDE 100 MCG: 10 INJECTION INTRAVENOUS at 13:56

## 2020-09-04 RX ADMIN — NORTRIPTYLINE HYDROCHLORIDE 10 MG: 10 CAPSULE ORAL at 21:45

## 2020-09-04 RX ADMIN — ACETAMINOPHEN 1000 MG: 500 TABLET ORAL at 11:09

## 2020-09-04 RX ADMIN — CELECOXIB 400 MG: 200 CAPSULE ORAL at 11:09

## 2020-09-04 RX ADMIN — MIDAZOLAM HYDROCHLORIDE 2 MG: 2 INJECTION, SOLUTION INTRAMUSCULAR; INTRAVENOUS at 13:40

## 2020-09-04 RX ADMIN — TRANEXAMIC ACID 1950 MG: 650 TABLET, FILM COATED ORAL at 11:09

## 2020-09-04 RX ADMIN — GABAPENTIN 800 MG: 400 CAPSULE ORAL at 20:20

## 2020-09-04 RX ADMIN — Medication 140 MG: at 13:44

## 2020-09-04 RX ADMIN — ROPIVACAINE HYDROCHLORIDE 30 ML: 5 INJECTION, SOLUTION EPIDURAL; INFILTRATION; PERINEURAL at 13:24

## 2020-09-04 ASSESSMENT — PULMONARY FUNCTION TESTS
PIF_VALUE: 3
PIF_VALUE: 24
PIF_VALUE: 23
PIF_VALUE: 22
PIF_VALUE: 1
PIF_VALUE: 23
PIF_VALUE: 0
PIF_VALUE: 23
PIF_VALUE: 23
PIF_VALUE: 24
PIF_VALUE: 23
PIF_VALUE: 22
PIF_VALUE: 4
PIF_VALUE: 22
PIF_VALUE: 24
PIF_VALUE: 23
PIF_VALUE: 22
PIF_VALUE: 23
PIF_VALUE: 23
PIF_VALUE: 24
PIF_VALUE: 3
PIF_VALUE: 22
PIF_VALUE: 23
PIF_VALUE: 23
PIF_VALUE: 24
PIF_VALUE: 23
PIF_VALUE: 2
PIF_VALUE: 22
PIF_VALUE: 23
PIF_VALUE: 22
PIF_VALUE: 23
PIF_VALUE: 23
PIF_VALUE: 22
PIF_VALUE: 10
PIF_VALUE: 22
PIF_VALUE: 24
PIF_VALUE: 23
PIF_VALUE: 27
PIF_VALUE: 23
PIF_VALUE: 24
PIF_VALUE: 24
PIF_VALUE: 22
PIF_VALUE: 23
PIF_VALUE: 22
PIF_VALUE: 23
PIF_VALUE: 23
PIF_VALUE: 22
PIF_VALUE: 22
PIF_VALUE: 23
PIF_VALUE: 21
PIF_VALUE: 23
PIF_VALUE: 22
PIF_VALUE: 23
PIF_VALUE: 26
PIF_VALUE: 24
PIF_VALUE: 22
PIF_VALUE: 23
PIF_VALUE: 23
PIF_VALUE: 20
PIF_VALUE: 23
PIF_VALUE: 23
PIF_VALUE: 22
PIF_VALUE: 23
PIF_VALUE: 24
PIF_VALUE: 24
PIF_VALUE: 23
PIF_VALUE: 22
PIF_VALUE: 23
PIF_VALUE: 22
PIF_VALUE: 23
PIF_VALUE: 22
PIF_VALUE: 23
PIF_VALUE: 23
PIF_VALUE: 21
PIF_VALUE: 25
PIF_VALUE: 23
PIF_VALUE: 24
PIF_VALUE: 23
PIF_VALUE: 2
PIF_VALUE: 22
PIF_VALUE: 0
PIF_VALUE: 22
PIF_VALUE: 23
PIF_VALUE: 23
PIF_VALUE: 24
PIF_VALUE: 23
PIF_VALUE: 22

## 2020-09-04 ASSESSMENT — PAIN SCALES - GENERAL
PAINLEVEL_OUTOF10: 7
PAINLEVEL_OUTOF10: 6
PAINLEVEL_OUTOF10: 9
PAINLEVEL_OUTOF10: 9

## 2020-09-04 ASSESSMENT — ENCOUNTER SYMPTOMS: SHORTNESS OF BREATH: 1

## 2020-09-04 ASSESSMENT — PAIN - FUNCTIONAL ASSESSMENT: PAIN_FUNCTIONAL_ASSESSMENT: 0-10

## 2020-09-04 NOTE — OP NOTE
Andie Cantrell [address]                      [facility], [address] [facility]                                OPERATIVE REPORT    PATIENT NAME: Deric Ramirez                   :        1970  MED REC NO:   [id number]                            ROOM:  ACCOUNT NO:   [de-identified]                           ADMIT DATE: 2020  PROVIDER:     Kelly Marin MD    DATE OF PROCEDURE:  2020    PREOPERATIVE DIAGNOSIS:  Instability status post left total knee. POSTOPERATIVE DIAGNOSIS:  Instability status post left total knee. PROCEDURE:  Revision total knee with poly exchange. SURGEON:  Kelly Marin MD    ASSISTANT:  Obed Wilson PA-C was present throughout the entire case. Given the nature of the disease process and the procedure, a skilled  surgical first assistant was necessary during the case. The assistant  was necessary to hold retractors and manipulate the extremity during the  procedure. A certified scrub tech was at the back table managing the  instruments and supplies for the surgical case. ANESTHESIA:  General with a regional block. BLOOD LOSS:  Minimal.    FLUIDS:  Less than 3 L.    COMPLICATIONS:  None. INDICATIONS:  A 26-year-old female, who is about 10 weeks out from total  knee. She fell down the stairs, was doing extremely well with motion  and activity, but then became unstable. After she fell down the stairs,  she had instability to collateral testing. MRI confirmed MCL complete  rupture. Due to the instability, revision surgery was recommended for  stabilization. SUMMATION OF EVENT:  The patient was brought to the operating room. After induction of anesthesia, routine prep and drape, standard old  incision was used. We opened skin and subcutaneous tissue. We created  a medial arthrotomy. We peeled the medial MCL off the bone medially to  expose the implant. Patella was everted.   We did a lateral release  _____ lot of scar tissue and had excellent exposure and the poly was  removed. We tested the implants in situ. There was no sign of  loosening of the tibia, femoral component or patellar component. We  lavaged and irrigated extensively. We then removed additional 4-6 mm of  bone in the box to accommodate a more constrained CPS poly liner. We  trialed poly liner starting with a 12 that was removed and went to 18. The 18 poly liner gave us full stability, excellent fit, full extension  and created a stable exam at 0, 30, 60 and 90 degrees. We felt this was  adequate control for stability and we did not need to remove the implant  and go to a fully condylar constrained implant construct at this time. Final poly was inserted, 18 CPS, this gave us full stability with a very  stable reduction. The arthrotomy was closed in full extension with  figure-of-eight style and #1 Vicryl followed by interrupted 2-0 running,  4-0 Monocryl compressive dressing and the patient was taken to recovery  room.         Anne Marie Amaya MD    D: 09/04/2020 15:09:43       T: 09/04/2020 15:20:27     [email or web address]  Job#: [id number]     Doc#: [id number]    CC:

## 2020-09-04 NOTE — FLOWSHEET NOTE
Patient arrived to Bed 224 on 2 [address] by bed per transport. Oriented patient to call light system and surroundings. Patient denies any pain or discomfort at this time. He left knee is wrapped with ace and immobilizer in place. Applied Ice to left knee. Repositioned patient in bed and gave sips of water and a diet pepsi. Ordered patient a dinner tray. Patient verbalizes no further needs, will begin admission as soon as possible and continue to monitor.

## 2020-09-04 NOTE — ANESTHESIA PROCEDURE NOTES
Peripheral Block    Patient location during procedure: pre-op  Staffing  Anesthesiologist: Ebony Shay MD  Performed: anesthesiologist   Preanesthetic Checklist  Completed: patient identified, site marked, surgical consent, pre-op evaluation, timeout performed, IV checked, risks and benefits discussed, monitors and equipment checked, anesthesia consent given, oxygen available and patient being monitored  Peripheral Block  Patient position: supine  Prep: ChloraPrep  Patient monitoring: cardiac monitor, continuous pulse ox, frequent blood pressure checks and IV access  Block type: Saphenous  Laterality: left  Injection technique: single-shot  Procedures: ultrasound guided  Local infiltration: ropivacaine  Infiltration strength: 0.5 %  Dose: 30 mL  Provider prep: mask and sterile gloves  Local infiltration: ropivacaine  Needle  Needle type: combined needle/nerve stimulator   Needle gauge: 21 G  Needle length: 10 cm  Needle localization: ultrasound guidance  Test dose: negative  Assessment  Injection assessment: negative aspiration for heme, no paresthesia on injection and local visualized surrounding nerve on ultrasound  Paresthesia pain: none  Slow fractionated injection: yes  Hemodynamics: stable  Reason for block: post-op pain management

## 2020-09-04 NOTE — PROGRESS NOTES
Tye FRANCIS at bedside with patient, pt responding to verbal stimuli and some pain stimuli. Pt arousing very slowly to awake. Warm blankets placed for comfort.

## 2020-09-04 NOTE — BRIEF OP NOTE
Brief Postoperative Note      Patient: Ardis Leyden  YOB: 1970  MRN: [id number]    Date of Procedure: 9/4/2020    Pre-Op Diagnosis: LEFT KNEE INSTABILITY S/P TKA    Post-Op Diagnosis: Same       Procedure(s):  LEFT TOTAL KNEE REVISION WITH CPS POLY SWAP    Surgeon(s):  Bety Camarillo MD    Assistant:  Physician Assistant: Ferny Helms PA-C    Anesthesia: Spinal    Estimated Blood Loss (mL): Minimal    Complications: None    Specimens:   ID Type Source Tests Collected by Time Destination   1 : LEFT KNEE JOINT FLUID Body Fluid Joint, Knee GRAM STAIN, CULTURE, ANAEROBIC AND AEROBIC Bety Camarillo MD 9/4/2020 1421    2 : LEFT KNEE JOINT FLUID Swab Joint, Knee CULTURE, ANAEROBIC AND AEROBIC Bety Camarillo MD 9/4/2020 1428        Implants:  Implant Name Type Inv.  Item Serial No.  Lot No. LRB No. Used Action   IMPL KNEE ASF PSN CPS 18MM VE L 6-9CD Knee IMPL KNEE ASF PSN CPS 18MM VE L 6-9CD  [employer] [id number] Left 1 Implanted         Drains: * No LDAs found *    Findings: instability left tka    Electronically signed by Bety Camarillo MD on 9/4/2020 at 2:51 PM

## 2020-09-04 NOTE — H&P
Interval History and Physical    I have interviewed and examined the patient and reviewed the recent History and Physical.  There have been no changes to the recent H&P documentation. No change in ROS or PE. Pt's allergies reviewed and no change List of meds on original H&P    No significant findings with ROS, family hx, social  Hx, ALL, surgical hx, med list or medical history     The patient understands the planned operation and its associated risks and benefits and agrees to proceed. The surgical consent form has been signed.     /81   Pulse 88   Temp 97.2 °F (36.2 °C) (Temporal)   Resp 16   Ht 5' 1\" (1.549 m)   Wt 241 lb 6 oz (109.5 kg)   LMP  (LMP Unknown)   SpO2 98%   BMI 45.61 kg/m²      Electronically signed by Teresa Daley MD on 9/4/2020 at 10:40 AM
to light and  accommodation. Extraocular eye movements are intact. CHEST:  Lungs are clear to auscultation bilaterally. CARDIAC:  Regular rate and rhythm. No murmurs, rubs, or gallops  appreciated. ABDOMEN:  Obese, soft, and nontender. Normoactive bowel sounds x4  quadrants. EXTREMITIES:  Left knee:  Well-healed incision, medial joint line pain,  and pain under stress with some instability. Current range of motion is  from 4 degrees to 90 degrees. ASSESSMENT:  Left knee instability, status post total knee replacement. PLAN:  Left revision total knee replacement with possible CPS Poly  versus Odilon CCK. Surgery will be on 09/04/2020 at [facility].         Quentin Wan    D: 09/02/2020 16:39:43       T: 09/02/2020 16:46:48     [email or web address]  Job#: [id number]     Doc#: [id number]

## 2020-09-04 NOTE — ANESTHESIA PRE PROCEDURE
Department of Anesthesiology  Preprocedure Note       Name:  Sanchez Correa   Age:  45 y.o.  :  1970                                          MRN:  [id number]         Date:  2020      Surgeon: Latasha Rice):  Junior Magy MD    Procedure: Procedure(s):  LEFT REVISION TOTAL KNEE ARTHROPLASTY WITH POSSIBLE CPS POLY VERSUS PERSONA CCK   SUPINE  JAQUELINE PERSONA CCK REVISION  7N,D,29, 12MM PS POLY; REVISION INSTRUMENT/ MOORELAND    Medications prior to admission:   Prior to Admission medications    Medication Sig Start Date End Date Taking? Authorizing Provider   oxyCODONE (ROXICODONE) 5 MG immediate release tablet Take 1 tablet by mouth 3 times daily as needed for Pain for up to 7 days. . 20 Yes Clarita Jama MD   tiZANidine (ZANAFLEX) 2 MG tablet Take 1 tablet by mouth 2 times daily as needed (pain spasms) 20 Yes Clarita Jama MD   oxybutynin (DITROPAN-XL) 10 MG extended release tablet TAKE 1 TABLET BY MOUTH EVERY DAY 6/3/20  Yes Stoney Hodgkin Matheson, DO   latanoprost (XALATAN) 0.005 % ophthalmic solution INSTILL 1 DROP INTO BOTH EYES IN THE EVENING 3/12/20  Yes Historical Provider, MD   gabapentin (NEURONTIN) 800 MG tablet Take 1 tablet by mouth 4 times daily for 30 days. 20 Yes Sophia Logan APRN - CNP   carvedilol (COREG) 6.25 MG tablet Take 6.25 mg by mouth 2 times daily (with meals)   Yes Historical Provider, MD   Misc. Devices (CANE) MISC 1 Units by Does not apply route once for 1 dose 20 Yes EDDIE Hermosillo CNP   aspirin 81 MG tablet Take 81 mg by mouth daily   Yes Historical Provider, MD   atorvastatin (LIPITOR) 40 MG tablet TAKE 1 TABLET BY MOUTH DAILY 19  Yes EDDIE Welch CNP   glipiZIDE (GLUCOTROL XL) 5 MG extended release tablet Take 1 tablet by mouth daily 19  Yes EDDIE Welch CNP   zolpidem (AMBIEN) 10 MG tablet Take 1 tablet by mouth nightly as needed. . 19  Yes Historical Provider, MD   HUMALOG 100 UNIT/ML injection vial ADMINISTER 10 UNITS UNDER THE SKIN THREE TIMES DAILY BEFORE MEALS 2/13/19  Yes Kendy Colmenares MD   potassium chloride (KLOR-CON M) 20 MEQ extended release tablet TAKE 1 TABLET BY MOUTH TWICE DAILY 1/28/19  Yes Kendy Colmenares MD   LINZESS 290 MCG CAPS capsule TAKE 1 CAPSULE BY MOUTH EVERY MORNING BEFORE BREAKFAST 1/16/19  Yes Kendy Colmenares MD   furosemide (LASIX) 40 MG tablet TAKE 1 TABLET BY MOUTH DAILY 1/16/19  Yes Kendy Colmenares MD   nortriptyline (PAMELOR) 10 MG capsule Take 1 capsule by mouth nightly 1/10/19 9/4/20 Yes Virgen Timmons MD   ONE TOUCH ULTRA TEST strip TEST THREE TIMES DAILY 12/18/18  Yes Kendy Colmenares MD   Iron Combinations (NIFEREX) TABS Take 1 tablet by mouth daily 11/19/18  Yes Kendy Colmenares MD   insulin detemir (LEVEMIR FLEXTOUCH) 100 UNIT/ML injection pen Inject 40 Units into the skin nightly 9/7/18 9/4/20 Yes Kendy Colmenares MD   meloxicam LOGAN LAGUERRE [facility]) 15 MG tablet Take 1 tablet by mouth daily 9/7/18  Yes Kendy Colmenares MD   metFORMIN (GLUCOPHAGE) 1000 MG tablet TAKE 1 TABLET BY MOUTH TWICE DAILY WITH MEALS 9/7/18  Yes Kendy Colmenares MD   omeprazole (PRILOSEC) 40 MG delayed release capsule Take 1 capsule by mouth daily 9/7/18  Yes Kendy Colmenares MD   ONE TOUCH ULTRA TEST strip TEST THREE TIMES DAILY 6/27/18  Yes Kendy Colmenares MD   butalbital-acetaminophen-caffeine (FIORICET, ESGIC) -40 MG per tablet Take 1 tablet by mouth 2 times daily as needed for Headaches   Yes Historical Provider, MD   albuterol sulfate HFA (VENTOLIN HFA) 108 (90 Base) MCG/ACT inhaler Inhale 2 puffs into the lungs every 6 hours as needed for Wheezing   Yes Historical Provider, MD   topiramate (TOPAMAX) 200 MG tablet Take 200 mg by mouth 2 times daily   Yes Historical Provider, MD   fluticasone-salmeterol (ADVAIR HFA) 45-21 MCG/ACT inhaler Inhale 2 puffs into the lungs 2 times daily   Yes Historical Provider, MD   NONFORMULARY Ibuprofen 10% ; Amitriptylline 2% ; Lidocaine 4% ;  Baclofen 2%  Sig: meperidine (DEMEROL) injection 12.5 mg  12.5 mg Intravenous Q5 Min PRN Alisa Pacheco MD           Allergies:     Allergies   Allergen Reactions    Heparin Shortness Of Breath    Pcn [Penicillins] Shortness Of Breath       Problem List:    Patient Active Problem List   Diagnosis Code    COPD (chronic obstructive pulmonary disease) ([facility]) J44.9    Asthma J45.909    Anxiety disorder F41.9    Chronic low back pain M54.5, G89.29    Psoriasis L40.9    Chronic anemia D64.9    Chronic hepatitis C without hepatic coma ([facility]) B18.2    Gout M10.9    Seizure disorder ([facility]) G40.909    Intractable chronic cluster headache G44.021    DM type 2 with diabetic peripheral neuropathy ([facility]) E11.42    Mixed hyperlipidemia E78.2    Essential hypertension I10    Vitamin D deficiency E55.9    Gastroesophageal reflux disease without esophagitis K21.9    Osteoarthritis of spine with radiculopathy, thoracolumbar region M47.25    Morbid obesity due to excess calories ([facility]) E66.01    Arthralgia of left knee M25.562    Transitional vertebrae Q76.49    Lumbar radiculopathy M54.16    Lumbar canal stenosis M48.061    Arthritis of right knee M17.11    Epidural lipomatosis D17.79    Status post lumbar spine surgery for decompression of spinal cord Z98.890    Abscess in epidural space of L2-L5 lumbar spine G06.1    Fever R50.9    Back pain M54.9    Right lumbar radiculopathy M54.16    Lumbar stenosis with neurogenic claudication M48.062    Hypokalemia E87.6    Spasm of back muscles M62.830    Right foot drop M21.371    Pseudoclaudication syndrome M48.062    History of lumbosacral spine surgery Z98.890    Chronic insomnia F51.04    Adhesive arachnoiditis G03.9    Morbid obesity with BMI of 45.0-49.9, adult ([facility]) E66.01, Z68.42    Mild chronic gastritis K29.50    Polyp of sigmoid colon D12.5    Postlaminectomy syndrome of lumbar region M96.1       Past Medical History:        Diagnosis Date    Abnormal cardiovascular stress test 2015    Anemia     Anxiety disorder     Anxiety disorder     Arthritis     Asthma     Cerebral artery occlusion with cerebral infarction (HCC)     TIZ x 3    Chest pain 11/3/2014    Chronic hepatitis B ([address] 2013    Chronic hepatitis C without hepatic coma (HCC) 2013    Chronic low back pain     Cirrhosis (HCC)     COPD (chronic obstructive pulmonary disease) (HCC)     Dizziness 2015    DM type 2 with diabetic peripheral neuropathy ([address] 2016    DM type 2 with diabetic peripheral neuropathy (HCC) 2016    Epidural lipomatosis     GERD (gastroesophageal reflux disease)     Gout     Headache 2015    Headache 2015    Hepatitis C     History of blood transfusion ?    no reaction     History of peptic ulcer disease     History of seizures     History of TIA (transient ischemic attack)     Hyperlipidemia     Hypertension     Lumbar stenosis     Obesity     Osteoarthritis of spine with radiculopathy, thoracolumbar region     Psoriasis     Seizure disorder ([address]     SOB (shortness of breath) 2015    Type II or unspecified type diabetes mellitus without mention of complication, not stated as uncontrolled        Past Surgical History:        Procedure Laterality Date    ABDOMINAL HERNIA REPAIR      BREAST BIOPSY Bilateral     BREAST SURGERY Bilateral 2008    nipple and milk gland removal    CARPAL TUNNEL RELEASE Bilateral      SECTION      CHOLECYSTECTOMY      COLONOSCOPY  2019    DR FELDER   [facility] FINGER TRIGGER RELEASE Bilateral     HYSTERECTOMY, TOTAL ABDOMINAL      KNEE ARTHROSCOPY Bilateral     LUMBAR SPINE SURGERY N/A 2017    L3-S1 DECOMPRESSION REMOVAL EPIDURAL MASS   performed by Jamir Mcnally MD at [address] [facility]  14      [address] [facility] GASTROINTESTINAL ENDOSCOPY  3/24/15    [facility]    UPPER GASTROINTESTINAL ENDOSCOPY  2019    DR FELDER Social History:    Social History     Tobacco Use    Smoking status: Former Smoker     Packs/day: 0.50     Types: Cigarettes     Start date: 1997     Last attempt to quit: 2009     Years since quittin.6    Smokeless tobacco: Never Used   Substance Use Topics    Alcohol use: No                                Counseling given: Not Answered      Vital Signs (Current):   Vitals:    20 1030   BP: 135/81   Pulse: 88   Resp: 16   Temp: 97.2 °F (36.2 °C)   TempSrc: Temporal   SpO2: 98%   Weight: 241 lb 6 oz (109.5 kg)   Height: 5' 1\" (1.549 m)                                              BP Readings from Last 3 Encounters:   20 135/81   20 (!) 112/56   20 125/79       NPO Status: Time of last liquid consumption:                         Time of last solid consumption:                         Date of last liquid consumption: 20                        Date of last solid food consumption: 20    BMI:   Wt Readings from Last 3 Encounters:   20 241 lb 6 oz (109.5 kg)   20 241 lb 6 oz (109.5 kg)   20 246 lb (111.6 kg)     Body mass index is 45.61 kg/m². CBC:   Lab Results   Component Value Date    WBC 6.9 2020    RBC 4.22 2020    RBC 4.21 2019    HGB 11.7 2020    HCT 36.8 2020    MCV 87.3 2020    RDW 15.1 2020     2020       CMP:   Lab Results   Component Value Date     2020    K 3.7 2020     2020    CO2 27 2020    BUN 15 2020    CREATININE 0.77 2020    GFRAA >60.0 2020    AGRATIO 1.0 2019    LABGLOM >60.0 2020    GLUCOSE 85 2020    GLUCOSE 75 2019    PROT 8.6 2020    CALCIUM 8.5 2020    BILITOT <0.2 2020    ALKPHOS 72 2020    AST 20 2020    ALT 17 2020       POC Tests: No results for input(s): POCGLU, POCNA, POCK, POCCL, POCBUN, POCHEMO, POCHCT in the last 72 hours.     Coags: Lab Results   Component Value Date    PROTIME 12.3 08/04/2018    INR 1.11 08/04/2018    APTT 25.9 08/09/2017       HCG (If Applicable): No results found for: PREGTESTUR, PREGSERUM, HCG, HCGQUANT     ABGs: No results found for: PHART, PO2ART, ULK1ZAW, SWC3IYN, BEART, S8UNBIWY     Type & Screen (If Applicable):  No results found for: LABABO, LABRH    Drug/Infectious Status (If Applicable):  No results found for: HIV, HEPCAB    COVID-19 Screening (If Applicable):   Lab Results   Component Value Date    COVID19 Not Detected 08/28/2020         Anesthesia Evaluation  Patient summary reviewed and Nursing notes reviewed no history of anesthetic complications:   Airway: Mallampati: II  TM distance: >3 FB   Neck ROM: full  Mouth opening: > = 3 FB Dental: normal exam         Pulmonary:normal exam    (+) COPD:  shortness of breath:  asthma:                            Cardiovascular:    (+) hypertension:,       ECG reviewed               Beta Blocker:  Dose within 24 Hrs         Neuro/Psych:   (+) seizures:, CVA:, neuromuscular disease:,             GI/Hepatic/Renal:   (+) GERD:, hepatitis: C, liver disease:,           Endo/Other:    (+) Diabetes, . Pt had PAT visit. Abdominal:   (+) obese,         Vascular:                                        Anesthesia Plan      spinal     ASA 4       Induction: intravenous. MIPS: Prophylactic antiemetics administered. Anesthetic plan and risks discussed with patient. Plan discussed with CRNA.     Attending anesthesiologist reviewed and agrees with Pre Eval content              Jossue Can MD   9/4/2020

## 2020-09-04 NOTE — PROGRESS NOTES
Pt received from surgery. X-ray complete at bedside. .  Pt not awaking from anesthesia yet,  Tye FRANCIS called to make aware. Vital Signs stable. Resps even and unlabored.

## 2020-09-04 NOTE — CONSULTS
Hyperlipidemia     Hypertension     Lumbar stenosis     Obesity     Osteoarthritis of spine with radiculopathy, thoracolumbar region     Psoriasis     Seizure disorder (HCC)     SOB (shortness of breath) 2015    Type II or unspecified type diabetes mellitus without mention of complication, not stated as uncontrolled        Past Surgical History:   Procedure Laterality Date    ABDOMINAL HERNIA REPAIR      BREAST BIOPSY Bilateral     BREAST SURGERY Bilateral 2008    nipple and milk gland removal    CARPAL TUNNEL RELEASE Bilateral      SECTION      CHOLECYSTECTOMY      COLONOSCOPY  2019    DR BRADFORD Ulloa Levans FINGER TRIGGER RELEASE Bilateral     HYSTERECTOMY, TOTAL ABDOMINAL      KNEE ARTHROSCOPY Bilateral     LUMBAR SPINE SURGERY N/A 2017    L3-S1 DECOMPRESSION REMOVAL EPIDURAL MASS   performed by Katherine Velasquez MD at [address] [facility]  14    DR. FRYE    UPP[facility]    UPPER GASTROINTESTINAL ENDOSCOPY  2019    DR FELDER       Prior to Admission medications    Medication Sig Start Date End Date Taking? Authorizing Provider   oxyCODONE (ROXICODONE) 5 MG immediate release tablet Take 1 tablet by mouth 3 times daily as needed for Pain for up to 7 days. . 20 Yes Javi Burnett MD   tiZANidine (ZANAFLEX) 2 MG tablet Take 1 tablet by mouth 2 times daily as needed (pain spasms) 20 Yes Javi Burnett MD   oxybutynin (DITROPAN-XL) 10 MG extended release tablet TAKE 1 TABLET BY MOUTH EVERY DAY 6/3/20  Yes Shiva Restrepo DO   latanoprost (XALATAN) 0.005 % ophthalmic solution INSTILL 1 DROP INTO BOTH EYES IN THE EVENING 3/12/20  Yes Historical Provider, MD   gabapentin (NEURONTIN) 800 MG tablet Take 1 tablet by mouth 4 times daily for 30 days.  20 Yes Sophia Logan, APRN - CNP   carvedilol (COREG) 6.25 MG tablet Take 6.25 mg by mouth 2 times daily (with meals)   Yes Historical Provider, MD [employer] (CANE) MISC 1 Units by Does not apply route once for 1 dose 1/16/20 9/4/20 Yes EDDIE Suazo - CNP   aspirin 81 MG tablet Take 81 mg by mouth daily   Yes Historical Provider, MD   atorvastatin (LIPITOR) 40 MG tablet TAKE 1 TABLET BY MOUTH DAILY 4/2/19  Yes EDDIE Welch - CNP   glipiZIDE (GLUCOTROL XL) 5 MG extended release tablet Take 1 tablet by mouth daily 4/2/19  Yes EDDIE Welch - CNP   zolpidem (AMBIEN) 10 MG tablet Take 1 tablet by mouth nightly as needed. . 2/20/19  Yes Historical Provider, MD   HUMALOG 100 UNIT/ML injection vial ADMINISTER 10 UNITS UNDER THE SKIN THREE TIMES DAILY BEFORE MEALS 2/13/19  Yes Alexandria Wilkes MD   potassium chloride (KLOR-CON M) 20 MEQ extended release tablet TAKE 1 TABLET BY MOUTH TWICE DAILY 1/28/19  Yes Alexandria Wilkes MD   LINZESS 290 MCG CAPS capsule TAKE 1 CAPSULE BY MOUTH EVERY MORNING BEFORE BREAKFAST 1/16/19  Yes Alexandria Wilkes MD   furosemide (LASIX) 40 MG tablet TAKE 1 TABLET BY MOUTH DAILY 1/16/19  Yes Alexandria Wilkes MD   nortriptyline (PAMELOR) 10 MG capsule Take 1 capsule by mouth nightly 1/10/19 9/4/20 Yes Sanchez Hickman MD   ONE TOUCH ULTRA TEST strip TEST THREE TIMES DAILY 12/18/18  Yes Alexandria Wilkes MD   Iron Combinations (NIFEREX) TABS Take 1 tablet by mouth daily 11/19/18  Yes Alexandria Wilkes MD   insulin detemir (LEVEMIR FLEXTOUCH) 100 UNIT/ML injection pen Inject 40 Units into the skin nightly 9/7/18 9/4/20 Yes Alexandria Wilkes MD   meloxicam [facility]) 15 MG tablet Take 1 tablet by mouth daily 9/7/18  Yes Alexandria Wilkes MD   metFORMIN (GLUCOPHAGE) 1000 MG tablet TAKE 1 TABLET BY MOUTH TWICE DAILY WITH MEALS 9/7/18  Yes Alexandria Wilkes MD   omeprazole (PRILOSEC) 40 MG delayed release capsule Take 1 capsule by mouth daily 9/7/18  Yes Alexandria Wilkes MD   ONE TOUCH ULTRA TEST strip TEST THREE TIMES DAILY 6/27/18  Yes Alexandria Wilkes MD   butalbital-acetaminophen-caffeine (FIORICET, ESGIC) -22 MG per tablet Take 1 tablet by mouth 2 times daily as needed for Headaches   Yes Historical Provider, MD   albuterol sulfate HFA (VENTOLIN HFA) 108 (90 Base) MCG/ACT inhaler Inhale 2 puffs into the lungs every 6 hours as needed for Wheezing   Yes Historical Provider, MD   topiramate (TOPAMAX) 200 MG tablet Take 200 mg by mouth 2 times daily   Yes Historical Provider, MD   fluticasone-salmeterol (ADVAIR HFA) 45-21 MCG/ACT inhaler Inhale 2 puffs into the lungs 2 times daily   Yes Historical Provider, MD   NONFORMULARY Ibuprofen 10% ; Amitriptylline 2% ; Lidocaine 4% ; Baclofen 2%  Sig: Apply topically 1-2 grams to the affected area 3-4 times daily. Rub in well for 1-2 minutes.   Quantity to Dispense: 120gm  Refills: 5    Historical Provider, MD   vitamin D (ERGOCALCIFEROL) 75323 units CAPS capsule TAKE 1 CAPSULE BY MOUTH 1 TIME A WEEK 2/21/19   Carry [place], MD   vitamin B-12 (CYANOCOBALAMIN) 1000 MCG tablet Take 1,000 mcg by mouth daily    Historical Provider, MD       Scheduled Meds:   tranexamic acid  1,950 mg Oral Q8H    tranexamic acid  1,950 mg Oral Once    sodium chloride flush  10 mL Intravenous 2 times per day    ceFAZolin (ANCEF) IVPB  2 g Intravenous Q8H    acetaminophen  650 mg Oral Q6H    senna-docusate  1 tablet Oral BID    aspirin  81 mg Oral BID     Continuous Infusions:   lactated ringers       PRN Meds:sodium chloride flush, oxyCODONE **OR** oxyCODONE, morphine, magnesium hydroxide, ondansetron **OR** ondansetron, famotidine (PEPCID) injection, diazePAM, ketorolac    Allergies   Allergen Reactions    Heparin Shortness Of Breath    Pcn [Penicillins] Shortness Of Breath       Social History     Socioeconomic History    Marital status:      Spouse name: Not on file    Number of children: Not on file    Years of education: Not on file    Highest education level: Not on file   Occupational History    Not on file   Social Needs    Financial resource strain: Not on file    Food insecurity     Worry: Not on file     Inability: Not on file    Transportation needs     Medical: Not on file     Non-medical: Not on file   Tobacco Use    Smoking status: Former Smoker     Packs/day: 0.50     Types: Cigarettes     Start date: 1997     Last attempt to quit: 2009     Years since quittin.6    Smokeless tobacco: Never Used   Substance and Sexual Activity    Alcohol use: No    Drug use: No    Sexual activity: Not on file   Lifestyle    Physical activity     Days per week: Not on file     Minutes per session: Not on file    Stress: Not on file   Relationships    Social connections     Talks on phone: Not on file     Gets together: Not on file     Attends Judaism service: Not on file     Active member of club or organization: Not on file     Attends meetings of clubs or organizations: Not on file     Relationship status: Not on file    Intimate partner violence     Fear of current or ex partner: Not on file     Emotionally abused: Not on file     Physically abused: Not on file     Forced sexual activity: Not on file   Other Topics Concern    Not on file   Social History Narrative    Not on file       Family History   Problem Relation Age of Onset    Diabetes Mother     Heart Disease Mother     High Blood Pressure Mother     High Cholesterol Mother     Kidney Disease Mother     Arthritis Mother     Glaucoma Mother     Cataracts Mother     Seizures Mother     Heart Failure Mother     Cancer Father         lung    Diabetes Sister     High Blood Pressure Sister     Liver Disease Sister     Other Sister         flesh eating bacteria    Cancer Sister     High Blood Pressure Brother     Diabetes Brother     Heart Attack Brother     High Cholesterol Brother     Heart Disease Brother     COPD Brother     Heart Failure Brother     Other Brother         gout    Cancer Sister         leukemia    Other Brother         gout, PVD    Diabetes Brother     High Blood Pressure Brother     No Known Problems Son     No Known Problems Daughter        Review Of Systems:   Constitutional: Negative for fever. HENT: Negative for hearing loss. Respiratory: Negative for shortness of breath. Gastrointestinal: Negative for constipation, diarrhea and nausea. Genitourinary: Negative for difficulty urinating. Musculoskeletal: Negative for back pain and neck pain. Skin: Negative for rash. Neurological: Negative for headaches. Hematological: Does not bruise/bleed easily. Psychiatric/Behavioral: negative    Physical Exam:  Vitals:    09/04/20 1640 09/04/20 1645 09/04/20 1650 09/04/20 1700   BP: (!) 146/82 (!) 143/83 (!) 141/81 (!) 150/86   Pulse: 86 87 92    Resp: 13 13 14    Temp:   97.6 °F (36.4 °C)    TempSrc:   Temporal    SpO2: 97% 98% 98%    Weight:       Height:           General: No acute distress  Eyes: No scleral icterus appreciated bilaterally  ENMT: Moist mucous membranes  Neck: Symmetric without any overt masses or lesions  Respiratory: Respirations are non-labored  Skin: Visualized skin is intact  Psych: Pleasant and cooperative with history and exam.  Neurologic: Pt is arousable and appropriately interactive. Responds promptly and appropriately to questions asked. No aberrant behaviors appreciated. Musculoskeletal: Left knee brace  Sensation grossly intact in both legs except for bilateral.  Reflexes and strength functional  no abnormal reflexes appreciated on exam today  Strength greater than 3/5 bilateral legs    Labs:  Recent Results (from the past 24 hour(s))   POCT Glucose    Collection Time: 09/04/20 11:26 AM   Result Value Ref Range    POC Glucose 98 60 - 115 mg/dl    Performed on ACCU-CHEK    Gram Stain    Collection Time: 09/04/20  2:21 PM    Specimen: Joint, Knee;  Body Fluid   Result Value Ref Range    Gram Stain Result No WBC's, No organisms seen    POCT Glucose    Collection Time: 09/04/20  3:43 PM   Result Value Ref Range    POC Glucose 121 (H) 60 - 115 mg/dl Performed on ACCU-CHEK      Assessment/Plan:  1. Status Post Revision of Left knee replacement: Pain management and therapy per Orthopedic surgery. 2. CAD/HTN/HLD: Stable. Continue home meds  3. DMII with hyperglycemia: ISS, hypoglycemia protocol POCT Glucose TIDAC & QHS   4. COPD, Stable. Duonebs PRN. Incentive Spirometry, Respiratory therapist assessment. Resume home meds.    5. Iron deficiency anemia: Iron supplementation     Electronically signed by EDDIE Warren CNP on 9/4/20 at 5:53 PM EDT

## 2020-09-04 NOTE — ANESTHESIA POSTPROCEDURE EVALUATION
Department of Anesthesiology  Postprocedure Note    Patient: Deepthi Rodriguez  MRN: [id number]  YOB: 1970  Date of evaluation: 9/4/2020  Time:  3:36 PM     Procedure Summary     Date:  09/04/20 Room / Location:  [address]    Anesthesia Start:  5483 Anesthesia Stop:      Procedure:  LEFT TOTAL KNEE REVISION WITH CPS POLY SWAP (Left Knee) Diagnosis:  (LEFT KNEE INSTABILITY S/P TKA)    Surgeon:  Sera Murillo MD Responsible Provider:  EDDIE Roche CRNA    Anesthesia Type:  spinal ASA Status:  4          Anesthesia Type: spinal    Tyrell Phase I:      Tyrell Phase II:      Last vitals: Reviewed and per EMR flowsheets.        Anesthesia Post Evaluation    Patient location during evaluation: PACU  Patient participation: complete - patient participated  Level of consciousness: awake and awake and alert  Pain score: 0  Airway patency: patent  Nausea & Vomiting: no nausea and no vomiting  Complications: no  Cardiovascular status: blood pressure returned to baseline and hemodynamically stable  Respiratory status: acceptable, spontaneous ventilation and face mask  Hydration status: euvolemic

## 2020-09-05 VITALS
OXYGEN SATURATION: 99 % | TEMPERATURE: 97.5 F | WEIGHT: 241.38 LBS | RESPIRATION RATE: 16 BRPM | SYSTOLIC BLOOD PRESSURE: 117 MMHG | HEART RATE: 79 BPM | HEIGHT: 61 IN | BODY MASS INDEX: 45.57 KG/M2 | DIASTOLIC BLOOD PRESSURE: 64 MMHG

## 2020-09-05 LAB
ANION GAP SERPL CALCULATED.3IONS-SCNC: 11 MEQ/L (ref 9–15)
BUN BLDV-MCNC: 10 MG/DL (ref 6–20)
CALCIUM SERPL-MCNC: 8.1 MG/DL (ref 8.5–9.9)
CHLORIDE BLD-SCNC: 110 MEQ/L (ref 95–107)
CO2: 20 MEQ/L (ref 20–31)
CREAT SERPL-MCNC: 0.65 MG/DL (ref 0.5–0.9)
GFR AFRICAN AMERICAN: >60
GFR NON-AFRICAN AMERICAN: >60
GLUCOSE BLD-MCNC: 118 MG/DL (ref 70–99)
GLUCOSE BLD-MCNC: 79 MG/DL (ref 60–115)
GLUCOSE BLD-MCNC: 81 MG/DL (ref 60–115)
HCT VFR BLD CALC: 36.5 % (ref 37–47)
HEMOGLOBIN: 11.6 G/DL (ref 12–16)
MCH RBC QN AUTO: 28 PG (ref 27–31.3)
MCHC RBC AUTO-ENTMCNC: 31.7 % (ref 33–37)
MCV RBC AUTO: 88.5 FL (ref 82–100)
PDW BLD-RTO: 15 % (ref 11.5–14.5)
PERFORMED ON: NORMAL
PERFORMED ON: NORMAL
PLATELET # BLD: 127 K/UL (ref 130–400)
POTASSIUM REFLEX MAGNESIUM: 4.1 MEQ/L (ref 3.4–4.9)
RBC # BLD: 4.13 M/UL (ref 4.2–5.4)
SODIUM BLD-SCNC: 141 MEQ/L (ref 135–144)
WBC # BLD: 7.3 K/UL (ref 4.8–10.8)

## 2020-09-05 PROCEDURE — 97162 PT EVAL MOD COMPLEX 30 MIN: CPT

## 2020-09-05 PROCEDURE — 6370000000 HC RX 637 (ALT 250 FOR IP): Performed by: ORTHOPAEDIC SURGERY

## 2020-09-05 PROCEDURE — 6370000000 HC RX 637 (ALT 250 FOR IP): Performed by: NURSE PRACTITIONER

## 2020-09-05 PROCEDURE — 97535 SELF CARE MNGMENT TRAINING: CPT

## 2020-09-05 PROCEDURE — 85027 COMPLETE CBC AUTOMATED: CPT

## 2020-09-05 PROCEDURE — 6360000002 HC RX W HCPCS: Performed by: ORTHOPAEDIC SURGERY

## 2020-09-05 PROCEDURE — 36415 COLL VENOUS BLD VENIPUNCTURE: CPT

## 2020-09-05 PROCEDURE — 6360000002 HC RX W HCPCS: Performed by: NURSE PRACTITIONER

## 2020-09-05 PROCEDURE — 97116 GAIT TRAINING THERAPY: CPT

## 2020-09-05 PROCEDURE — 97166 OT EVAL MOD COMPLEX 45 MIN: CPT

## 2020-09-05 PROCEDURE — 80048 BASIC METABOLIC PNL TOTAL CA: CPT

## 2020-09-05 PROCEDURE — 2700000000 HC OXYGEN THERAPY PER DAY

## 2020-09-05 PROCEDURE — 2580000003 HC RX 258: Performed by: ORTHOPAEDIC SURGERY

## 2020-09-05 RX ORDER — OXYCODONE HYDROCHLORIDE 5 MG/1
5 TABLET ORAL EVERY 4 HOURS PRN
Qty: 40 TABLET | Refills: 0 | Status: SHIPPED | OUTPATIENT
Start: 2020-09-05 | End: 2020-10-27 | Stop reason: SDUPTHER

## 2020-09-05 RX ORDER — KETOROLAC TROMETHAMINE 30 MG/ML
30 INJECTION, SOLUTION INTRAMUSCULAR; INTRAVENOUS ONCE
Status: COMPLETED | OUTPATIENT
Start: 2020-09-05 | End: 2020-09-05

## 2020-09-05 RX ORDER — DIAZEPAM 5 MG/1
5 TABLET ORAL EVERY 6 HOURS PRN
Qty: 28 TABLET | Refills: 0 | Status: SHIPPED | OUTPATIENT
Start: 2020-09-05 | End: 2020-09-12

## 2020-09-05 RX ORDER — AMOXICILLIN 250 MG
1 CAPSULE ORAL 2 TIMES DAILY
Qty: 60 TABLET | Refills: 0 | Status: SHIPPED | OUTPATIENT
Start: 2020-09-05 | End: 2020-10-05

## 2020-09-05 RX ORDER — ASPIRIN 81 MG/1
81 TABLET ORAL 2 TIMES DAILY
Qty: 60 TABLET | Refills: 0 | Status: SHIPPED | OUTPATIENT
Start: 2020-09-05 | End: 2020-09-29 | Stop reason: SDUPTHER

## 2020-09-05 RX ADMIN — ACETAMINOPHEN 650 MG: 325 TABLET, FILM COATED ORAL at 03:17

## 2020-09-05 RX ADMIN — METFORMIN HYDROCHLORIDE 1000 MG: 500 TABLET ORAL at 08:38

## 2020-09-05 RX ADMIN — KETOROLAC TROMETHAMINE 30 MG: 30 INJECTION, SOLUTION INTRAMUSCULAR at 08:37

## 2020-09-05 RX ADMIN — Medication 150 MG: at 08:38

## 2020-09-05 RX ADMIN — GLIPIZIDE 5 MG: 5 TABLET ORAL at 08:37

## 2020-09-05 RX ADMIN — METFORMIN HYDROCHLORIDE 1000 MG: 500 TABLET ORAL at 17:13

## 2020-09-05 RX ADMIN — OXYCODONE HYDROCHLORIDE 10 MG: 5 TABLET ORAL at 19:34

## 2020-09-05 RX ADMIN — FUROSEMIDE 40 MG: 40 TABLET ORAL at 08:38

## 2020-09-05 RX ADMIN — PANTOPRAZOLE SODIUM 40 MG: 40 TABLET, DELAYED RELEASE ORAL at 06:31

## 2020-09-05 RX ADMIN — GABAPENTIN 800 MG: 400 CAPSULE ORAL at 08:38

## 2020-09-05 RX ADMIN — OXYCODONE HYDROCHLORIDE 10 MG: 5 TABLET ORAL at 10:49

## 2020-09-05 RX ADMIN — GABAPENTIN 800 MG: 400 CAPSULE ORAL at 17:13

## 2020-09-05 RX ADMIN — CEFAZOLIN SODIUM 2 G: 2 SOLUTION INTRAVENOUS at 03:17

## 2020-09-05 RX ADMIN — Medication 10 ML: at 08:39

## 2020-09-05 RX ADMIN — OXYCODONE HYDROCHLORIDE 10 MG: 5 TABLET ORAL at 06:32

## 2020-09-05 RX ADMIN — OXYCODONE HYDROCHLORIDE 10 MG: 5 TABLET ORAL at 15:39

## 2020-09-05 RX ADMIN — CARVEDILOL 6.25 MG: 6.25 TABLET, FILM COATED ORAL at 08:37

## 2020-09-05 RX ADMIN — ACETAMINOPHEN 650 MG: 325 TABLET, FILM COATED ORAL at 15:39

## 2020-09-05 RX ADMIN — CYANOCOBALAMIN TAB 500 MCG 1000 MCG: 500 TAB at 08:37

## 2020-09-05 RX ADMIN — POTASSIUM CHLORIDE 20 MEQ: 20 TABLET, EXTENDED RELEASE ORAL at 08:37

## 2020-09-05 RX ADMIN — CARVEDILOL 6.25 MG: 6.25 TABLET, FILM COATED ORAL at 17:13

## 2020-09-05 RX ADMIN — ASPIRIN 81 MG: 81 TABLET, COATED ORAL at 08:37

## 2020-09-05 RX ADMIN — TOPIRAMATE 200 MG: 200 TABLET, FILM COATED ORAL at 08:38

## 2020-09-05 RX ADMIN — TRANEXAMIC ACID 1950 MG: 650 TABLET, FILM COATED ORAL at 06:31

## 2020-09-05 RX ADMIN — DIAZEPAM 5 MG: 5 TABLET ORAL at 17:49

## 2020-09-05 RX ADMIN — DOCUSATE SODIUM 50 MG AND SENNOSIDES 8.6 MG 1 TABLET: 8.6; 5 TABLET, FILM COATED ORAL at 08:37

## 2020-09-05 RX ADMIN — ACETAMINOPHEN 650 MG: 325 TABLET, FILM COATED ORAL at 08:38

## 2020-09-05 ASSESSMENT — PAIN SCALES - GENERAL
PAINLEVEL_OUTOF10: 8
PAINLEVEL_OUTOF10: 7
PAINLEVEL_OUTOF10: 6
PAINLEVEL_OUTOF10: 8
PAINLEVEL_OUTOF10: 6
PAINLEVEL_OUTOF10: 7
PAINLEVEL_OUTOF10: 6
PAINLEVEL_OUTOF10: 8

## 2020-09-05 ASSESSMENT — PAIN DESCRIPTION - LOCATION
LOCATION: KNEE

## 2020-09-05 ASSESSMENT — PAIN DESCRIPTION - FREQUENCY: FREQUENCY: CONTINUOUS

## 2020-09-05 ASSESSMENT — PAIN DESCRIPTION - DESCRIPTORS
DESCRIPTORS: ACHING
DESCRIPTORS: ACHING

## 2020-09-05 ASSESSMENT — PAIN DESCRIPTION - PROGRESSION: CLINICAL_PROGRESSION: GRADUALLY IMPROVING

## 2020-09-05 ASSESSMENT — PAIN DESCRIPTION - PAIN TYPE
TYPE: SURGICAL PAIN
TYPE: CHRONIC PAIN
TYPE: SURGICAL PAIN
TYPE: SURGICAL PAIN

## 2020-09-05 ASSESSMENT — PAIN DESCRIPTION - ORIENTATION
ORIENTATION: LEFT

## 2020-09-05 ASSESSMENT — PAIN DESCRIPTION - ONSET: ONSET: ON-GOING

## 2020-09-05 NOTE — PROGRESS NOTES
[facility] OCCUPATIONAL THERAPY EVALUATION - ACUTE     NAME: Hoyle Apley  : 1970 (45 y.o.)  MRN: [id number]  CODE STATUS: Full Code  Room: [place] [address]    Date of Service: 2020    Patient Diagnosis(es): Status post revision of total knee replacement, left [Z96.652]   No chief complaint on file.     Patient Active Problem List    Diagnosis Date Noted    Status post revision of total knee replacement, left 2020    Postlaminectomy syndrome of lumbar region 10/22/2018    Mild chronic gastritis     Polyp of sigmoid colon     Morbid obesity with BMI of 45.0-49.9, adult ([address] 2018    Adhesive arachnoiditis 2018    Chronic insomnia 2018    History of lumbosacral spine surgery 2018    Pseudoclaudication syndrome 2017    Right foot drop 2017    Right lumbar radiculopathy 2017    Lumbar stenosis with neurogenic claudication 2017    Spasm of back muscles 09/10/2017    Hypokalemia 2017    Back pain 2017    Status post lumbar spine surgery for decompression of spinal cord 2017    Abscess in epidural space of L2-L5 lumbar spine 2017    Fever 2017    Epidural lipomatosis 2017    Arthritis of right knee 2017    Lumbar canal stenosis 2017    Lumbar radiculopathy 2017    Transitional vertebrae 2017    Gastroesophageal reflux disease without esophagitis 2017    Osteoarthritis of spine with radiculopathy, thoracolumbar region 2017    Morbid obesity due to excess calories ([address] 2017    Arthralgia of left knee 2017    Vitamin D deficiency 2017    DM type 2 with diabetic peripheral neuropathy ([address] 2016    Mixed hyperlipidemia 2016    Essential hypertension 2016    Intractable chronic cluster headache 2016    Seizure disorder ([address] 2014    Gout     Chronic hepatitis C without hepatic coma ([address] 2013    COPD at [address] Left 9/4/2020    LEFT TOTAL KNEE REVISION WITH CPS POLY SWAP performed by Adelina Doyle MD at [address]  1/6/14     [facility] GASTR[facility]INTEST[facility]AL ENDOSCOPY  3/24/15    [facility]    UPPER GASTROINTESTINAL ENDOSCOPY  04/08/2019    DR FELDER        Restrictions  Restrictions/Precautions: Weight Bearing, Fall Risk  Lower Extremity Weight Bearing Restrictions  Left Lower Extremity Weight Bearing: Weight Bearing As Tolerated  Required Braces or Orthoses  Left Lower Extremity Brace: Knee Immobilizer  Position Activity Restriction  Other position/activity restrictions: KI until + SLR     Safety Devices: Safety Devices  Safety Devices in place: Yes  Type of devices:  All fall risk precautions in place        Subjective  Pre Treatment Pain Screening  Pain at present: 8  Scale Used: Numeric Score  Intervention List: Patient able to continue with treatment  Comments / Details: RN administered pain medication    Pain Reassessment:   Pain Assessment  Patient Currently in Pain: Yes  Pain Assessment: 0-10  Pain Level: 8  Pain Type: Surgical pain  Pain Location: Knee  Pain Orientation: Left       Prior Level of Function:  Social/Functional History  Lives With: Spouse  Type of Home: House  Home Layout: One level  Bathroom Shower/Tub: Tub/Shower unit  Home Equipment: Roll About, Rolling walker, Cane  ADL Assistance: Independent  Homemaking Assistance: Independent  Homemaking Responsibilities: Yes(shared)  Ambulation Assistance: Independent(cane)  Transfer Assistance: Independent  Active : No  Occupation: On disability    OBJECTIVE:     Orientation Status:  Orientation  Overall Orientation Status: Within Functional Limits    Observation:  Observation/Palpation  Observation: KI open with ice on L knee, aquacel dressing on L knee    Cognition Status:  Cognition  Overall Cognitive Status: WFL    Perception Status:  Perception  Overall Perceptual Status: WFL    Sensation Status:  Sensation  Overall Sensation Status: WFL(pt denies n/t)    Vision and Hearing Status:  Vision  Vision: Impaired  Vision Exceptions: Wears glasses at all times  Hearing  Hearing: Within functional limits     ROM:   LUE AROM (degrees)  LUE AROM : WFL  Left Hand AROM (degrees)  Left Hand AROM: WFL  RUE AROM (degrees)  RUE AROM : WFL  Right Hand AROM (degrees)  Right Hand AROM: WFL    Strength:  LUE Strength  L Hand General: 4/5  LUE Strength Comment: 4/5  RUE Strength  R Hand General: 4/5  RUE Strength Comment: 3+/5 R shoulder, 4/5 elbow to hand    Coordination, Tone, Quality of Movement: Tone RUE  RUE Tone: Normotonic  Tone LUE  LUE Tone: Normotonic  Coordination  Movements Are Fluid And Coordinated: Yes    Hand Dominance:  Hand Dominance  Hand Dominance: Right    ADL Status:  ADL  Feeding: Independent  Grooming: Setup  UE Bathing: Setup  LE Bathing:  Moderate assistance  UE Dressing: Setup  LE Dressing: Maximum assistance  Toileting: Maximum assistance  Additional Comments: ADL's simulated bedside  Toilet Transfers  Toilet Transfer: Unable to assess  Toilet Transfers Comments: anticipate Min A       Therapy key for assistance levels -   Independent = Pt. is able to perform task with no assistance but may require a device   Stand by assistance = Pt. does not perform task at an independent level but does not need physical assistance, requires verbal cues  Minimal, Moderate, Maximal Assistance = Pt. requires physical assistance (25%, 50%, 75% assist from helper) for task but is able to actively participate in task   Dependent = Pt. requires total assistance with task and is not able to actively participate with task completion     Functional Mobility:  Functional Mobility  Functional - Mobility Device: Rolling Walker  Activity: Other(to chair)  Assist Level: Contact guard assistance  Transfers  Sit to stand: Minimal assistance  Stand to sit: Contact guard assistance    Bed Mobility  Bed mobility  Supine to Sit: Minimal assistance(support LLE)  Sit to Supine: Unable to assess(pt in chair)    Seated and Standing Balance:  Balance  Sitting Balance: Supervision  Standing Balance: Contact guard assistance    Functional Endurance:  Activity Tolerance  Activity Tolerance: fair    D/C Recommendations:  OT D/C RECOMMENDATIONS  REQUIRES OT FOLLOW UP: Yes    Equipment Recommendations:  OT Equipment Recommendations  Equipment Needed: Yes  Mobility Devices: ADL Assistive Devices  ADL Assistive Devices: Shower Chair with back, Reacher, Sock-Aid Hard  Other: continue to assess    OT Education:   OT Education  OT Education: OT Role, Plan of Care  Barriers to Learning: none    OT Follow Up:  OT D/C RECOMMENDATIONS  REQUIRES OT FOLLOW UP: Yes       Assessment/Discharge Disposition:  Assessment: Pt is a 45 y.o. female s/p left total knee revision. Pt with above mentioned deficits impairing ability to complete ADL's at reported baseline IND level. Pt may benefit from OT services to address deficits and maximize function. Performance deficits / Impairments: Decreased functional mobility , Decreased ADL status, Decreased strength, Decreased high-level IADLs, Decreased endurance  Discharge Recommendations: Continue to assess pending progress  Decision Making: Medium Complexity  History: multi comorb  Exam: 5 perf imp  Assistance / Modification:  Mod A    Six Click Score   How much help for putting on and taking off regular lower body clothing?: A Lot  How much help for Bathing?: A Lot  How much help for Toileting?: A Lot  How much help for putting on and taking off regular upper body clothing?: A Little  How much help for taking care of personal grooming?: A Little  How much help for eating meals?: None  AM-PAC Inpatient Daily Activity Raw Score: 16  AM-PAC Inpatient ADL T-Scale Score : 35.96  ADL Inpatient CMS 0-100% Score: 53.32    Plan:  Plan  Times per week: 1-3  Plan weeks: ALEX  Current Treatment Recommendations: Strengthening, Functional Mobility Training, Endurance Training, Neuromuscular Re-education, Pain Management, Patient/Caregiver Education & Training, Equipment Evaluation, Education, & procurement, Self-Care / ADL    Goals:   Patient will:    - Improve functional endurance to tolerate/complete 30-45 mins of ADL's  - Be SBA in LB ADLs  - Be SBA in ADL transfers without LOB  - Be SBA in toileting tasks  - Improve R UE strength and endurance to increase by 1/2 MMT grade in order to participate in self-care activities as projected. - Access appropriate D/C site with as few architectural barriers as possible. Patient Goal:  Get better   Discussed and agreed upon: Yes Comments:     Therapy Time:   OT Individual Minutes  Time In: 9860  Time Out: 0900  Minutes: 25    Eval: 25 minutes     Electronically signed by:     SHERITA Denise  9/5/2020, 9:15 AM

## 2020-09-05 NOTE — PROGRESS NOTES
Physical Therapy Med Surg Daily Treatment Note  Fac[facility]Department: [facility]  Room: N224/N224-01       NAME: Maryjane Romo  : 1970 (45 y.o.)  MRN: [id number]  CODE STATUS: Full Code    Date of Service: 2020    Patient Diagnosis(es): Status post revision of total knee replacement, left [Z96.652]   No chief complaint on file.     Patient Active Problem List    Diagnosis Date Noted    Status post revision of total knee replacement, left 2020    Postlaminectomy syndrome of lumbar region 10/22/2018    Mild chronic gastritis     Polyp of sigmoid colon     Morbid obesity with BMI of 45.0-49.9, adult ([facility] 2018    Adhesive arachnoiditis 2018    Chronic insomnia 2018    History of lumbosacral spine surgery 2018    Pseudoclaudication syndrome 2017    Right foot drop 2017    Right lumbar radiculopathy 2017    Lumbar stenosis with neurogenic claudication 2017    Spasm of back muscles 09/10/2017    Hypokalemia 2017    Back pain 2017    Status post lumbar spine surgery for decompression of spinal cord 2017    Abscess in epidural space of L2-L5 lumbar spine 2017    Fever 2017    Epidural lipomatosis 2017    Arthritis of right knee 2017    Lumbar canal stenosis 2017    Lumbar radiculopathy 2017    Transitional vertebrae 2017    Gastroesophageal reflux disease without esophagitis 2017    Osteoarthritis of spine with radiculopathy, thoracolumbar region 2017    Morbid obesity due to excess calories ([address] 2017    Arthralgia of left knee 2017    Vitamin D deficiency 2017    DM type 2 with diabetic peripheral neuropathy ([address] 2016    Mixed hyperlipidemia 2016    Essential hypertension 2016    Intractable chronic cluster headache 2016    Seizure disorder ([address] 2014    Gout     Chronic hepatitis C without hepatic coma ([address] 2013    COPD (chronic obstructive pulmonary disease) (HCC)     Asthma     Anxiety disorder     Chronic low back pain     Psoriasis     Chronic anemia         Past Medical History:   Diagnosis Date    Abnormal cardiovascular stress test 2015    Anemia     Anxiety disorder     Anxiety disorder     Arthritis     Asthma     Cerebral artery occlusion with cerebral infarction ([address]     TIZ x 3    Chest pain 11/3/2014    Chronic hepatitis B ([address] 2013    Chronic hepatitis C without hepatic coma (HCC) 2013    Chronic low back pain     Cirrhosis (HCC)     COPD (chronic obstructive pulmonary disease) (HCC)     Dizziness 2015    DM type 2 with diabetic peripheral neuropathy ([address] 2016    DM type 2 with diabetic peripheral neuropathy (HCC) 2016    Epidural lipomatosis     GERD (gastroesophageal reflux disease)     Gout     Headache 2015    Headache 2015    Hepatitis C     History of blood transfusion ?    no reaction     History of peptic ulcer disease     History of seizures     History of TIA (transient ischemic attack)     Hyperlipidemia     Hypertension     Lumbar stenosis     Obesity     Osteoarthritis of spine with radiculopathy, thoracolumbar region     Psoriasis     Seizure disorder ([address]     SOB (shortness of breath) 2015    Type II or unspecified type diabetes mellitus without mention of complication, not stated as uncontrolled      Past Surgical History:   Procedure Laterality Date    ABDOMINAL HERNIA REPAIR      BREAST BIOPSY Bilateral     BREAST SURGERY Bilateral     nipple and milk gland removal    CARPAL TUNNEL RELEASE Bilateral      SECTION      CHOLECYSTECTOMY      COLONOSCOPY  2019    DR FELDER   Venus Langton FINGER TRIGGER RELEASE Bilateral     HYSTERECTOMY, TOTAL ABDOMINAL      KNEE ARTHROSCOPY Bilateral     LUMBAR SPINE SURGERY N/A 2017    L3-S1 DECOMPRESSION REMOVAL EPIDURAL MASS   performed by Paolo Berumen MD at [address] Left 9/4/2020    LEFT TOTAL KNEE REVISION WITH CPS POLY SWAP performed by Lily Aquino MD at [facility]  1/6/14     [address] [facility] GASTROINTESTINAL ENDOSCOPY  3/24/15    [facility]    UPPER GASTROINTESTINAL ENDOSCOPY  04/08/2019    DR FELDER       Restrictions  Restrictions/Precautions: Weight Bearing; Fall Risk  Lower Extremity Weight Bearing Restrictions  Left Lower Extremity Weight Bearing: Weight Bearing As Tolerated  Required Braces or Orthoses  Left Lower Extremity Brace: Knee Immobilizer  Position Activity Restriction  Other position/activity restrictions: KI until + SLR    SUBJECTIVE   General  Chart Reviewed: Yes  Family / Caregiver Present: No  Subjective  Subjective: \"I am going home soon, I'm not ready to take off the brace. \"    Pre-Session Pain Report  Pre Treatment Pain Screening  Pain at present: 6  Scale Used: Numeric Score  Intervention List: Patient able to continue with treatment  Pain Screening  Patient Currently in Pain: Yes       Post-Session Pain Report  Pain Assessment  Pain Assessment: 0-10  Pain Level: 6  Pain Type: Surgical pain  Pain Location: Knee         OBJECTIVE        Bed mobility  Supine to Sit: Contact guard assistance  Sit to Supine: Contact guard assistance  Comment: increased time and effort, pt using UE to advance LLE into bed. Transfers  Sit to Stand: Contact guard assistance  Stand to sit: Contact guard assistance  Comment: good ability, safe and steady. Ambulation  Ambulation?: Yes  Ambulation 1  Surface: level tile  Device: Rolling Walker  Assistance: Contact guard assistance  Quality of Gait: downward gaze  Gait Deviations: Slow Diana; Increased SEA; Decreased step length;Decreased step height  Distance: 28' x2  Comments: increased time and effort, able to increase ambulation distance without issue.     Stairs/Curb  Stairs?: No(pt states she has no stairs at home, doesn't wish to practice.)               Exercises  Comments: pt given written HEP, pt verbalizes understanding of therex from previous surgeries. Activity Tolerance  Activity Tolerance: Patient Tolerated treatment well          ASSESSMENT   Assessment: pt able to increase ambulation distance without issue. Verbalizes understanding of all exercises. Discharge Recommendations:  Continue to assess pending progress    Goals  Long term goals  Long term goal 1: Bed mobility with indep  Long term goal 2: Functional transfers with indep  Long term goal 3: Amb  >50ft with 2ww and indep  Long term goal 4: indep with HEP to improve LE strength and ROM to L LE/knee  Patient Goals   Patient goals : \"return home\"    PLAN    Times per week: 5-7  Times per day: Twice a day  Safety Devices  Type of devices: All fall risk precautions in place, Bed alarm in place, Call light within reach, Left in bed     AMPAC (6 CLICK) BASIC MOBILITY  AM-PAC Inpatient Mobility Raw Score : 18      Therapy Time   Individual   Time In 1345   Time Out 1402   Minutes 17      Gait: 13  BM/trsf: 110 Metker Scarbro, PTA, 09/05/20 at 2:58 PM         Definitions for assistance levels  Independent = pt does not require any physical supervision or assistance from another person for activity completion. Device may be needed.   Stand by assistance = pt requires verbal cues or instructions from another person, close to but not touching, to perform the activity  Minimal assistance= pt performs 75% or more of the activity; assistance is required to complete the activity  Moderate assistance= pt performs 50% of the activity; assistance is required to complete the activity  Maximal assistance = pt performs 25% of the activity; assistance is required to complete the activity  Dependent = pt requires total physical assistance to accomplish the task

## 2020-09-05 NOTE — CARE COORDINATION
Spoke with pt. She is from home w. . Per PT recommendations PT HHC, pt confirmed this is what she wants and with last surgery had [facility] would like the same. Has DME at home she needs. Referral placed to Roz Silva LPN, d/c is for today Contact information for [facility] placed. in d/c instructions.  Electronically signed by Anibal Marshall RN on 9/5/2020 at 11:37 AM    /

## 2020-09-05 NOTE — PROGRESS NOTES
Physical Therapy Med Surg Initial Assessment  Facility/Department: [facility]  Room: N224/N224-01       NAME: Nasim Jacobs  : 1970 (45 y.o.)  MRN: [id number]  CODE STATUS: Full Code    Date of Service: 2020    Patient Diagnosis(es): Status post revision of total knee replacement, left [Z96.652]   No chief complaint on file.     Patient Active Problem List    Diagnosis Date Noted    Status post revision of total knee replacement, left 2020    Postlaminectomy syndrome of lumbar region 10/22/2018    Mild chronic gastritis     Polyp of sigmoid colon     Morbid obesity with BMI of 45.0-49.9, adult ([address] 2018    Adhesive arachnoiditis 2018    Chronic insomnia 2018    History of lumbosacral spine surgery 2018    Pseudoclaudication syndrome 2017    Right foot drop 2017    Right lumbar radiculopathy 2017    Lumbar stenosis with neurogenic claudication 2017    Spasm of back muscles 09/10/2017    Hypokalemia 2017    Back pain 2017    Status post lumbar spine surgery for decompression of spinal cord 2017    Abscess in epidural space of L2-L5 lumbar spine 2017    Fever 2017    Epidural lipomatosis 2017    Arthritis of right knee 2017    Lumbar canal stenosis 2017    Lumbar radiculopathy 2017    Transitional vertebrae 2017    Gastroesophageal reflux disease without esophagitis 2017    Osteoarthritis of spine with radiculopathy, thoracolumbar region 2017    Morbid obesity due to excess calories ([address] 2017    Arthralgia of left knee 2017    Vitamin D deficiency 2017    DM type 2 with diabetic peripheral neuropathy ([address] 2016    Mixed hyperlipidemia 2016    Essential hypertension 2016    Intractable chronic cluster headache 2016    Seizure disorder ([address] 2014    Gout     Chronic hepatitis C without hepatic coma ([address] 2013    COPD (chronic obstructive pulmonary disease) (HCC)     Asthma     Anxiety disorder     Chronic low back pain     Psoriasis     Chronic anemia         Past Medical History:   Diagnosis Date    Abnormal cardiovascular stress test 2015    Anemia     Anxiety disorder     Anxiety disorder     Arthritis     Asthma     Cerebral artery occlusion with cerebral infarction ([address]     TIZ x 3    Chest pain 11/3/2014    Chronic hepatitis B ([address] 2013    Chronic hepatitis C without hepatic coma (HCC) 2013    Chronic low back pain     Cirrhosis (HCC)     COPD (chronic obstructive pulmonary disease) (HCC)     Dizziness 2015    DM type 2 with diabetic peripheral neuropathy ([address] 2016    DM type 2 with diabetic peripheral neuropathy (HCC) 2016    Epidural lipomatosis     GERD (gastroesophageal reflux disease)     Gout     Headache 2015    Headache 2015    Hepatitis C     History of blood transfusion ?    no reaction     History of peptic ulcer disease     History of seizures     History of TIA (transient ischemic attack)     Hyperlipidemia     Hypertension     Lumbar stenosis     Obesity     Osteoarthritis of spine with radiculopathy, thoracolumbar region     Psoriasis     Seizure disorder ([address]     SOB (shortness of breath) 2015    Type II or unspecified type diabetes mellitus without mention of complication, not stated as uncontrolled      Past Surgical History:   Procedure Laterality Date    ABDOMINAL HERNIA REPAIR      BREAST BIOPSY Bilateral     BREAST SURGERY Bilateral     nipple and milk gland removal    CARPAL TUNNEL RELEASE Bilateral      SECTION      CHOLECYSTECTOMY      COLONOSCOPY  2019    DR BRADFORD Yoon FINGER TRIGGER RELEASE Bilateral     HYSTERECTOMY, TOTAL ABDOMINAL      KNEE ARTHROSCOPY Bilateral     LUMBAR SPINE SURGERY N/A 2017    L3-S1 DECOMPRESSION REMOVAL EPIDURAL MASS performed by Cynthia Church MD at [address] [facility] [address] Left 9/4/2020    LEFT TOTAL KNEE REVISION WITH CPS POLY SWAP performed by Yen Hamilton MD at [place] [address]  1/6/14     [address] GASTROINTESTINAL ENDOSCOPY  3/24/15    [facility]    UPPER GASTROINTESTINAL ENDOSCOPY  04/08/2019    DR FELDER       Chart Reviewed: Yes  Family / Caregiver Present: No  General Comment  Comments: Pt agreeable to PT eval, sitting up in bed upon arrival    Restrictions:  Restrictions/Precautions: Weight Bearing, Fall Risk  Lower Extremity Weight Bearing Restrictions  Left Lower Extremity Weight Bearing: Weight Bearing As Tolerated  Required Braces or Orthoses  Left Lower Extremity Brace: Knee Immobilizer  Position Activity Restriction  Other position/activity restrictions: KI until + SLR     SUBJECTIVE: Subjective: Pt reporting that she had fallen goign down stairs and hyper extended her TKA.  This led to her current total joint revision    Pain  Pre Treatment Pain Screening  Pain at present: 8  Scale Used: Numeric Score  Intervention List: Patient able to continue with treatment;Nurse called to administer meds    Post Treatment Pain Screening:   Pain Screening  Patient Currently in Pain: Yes  Pain Assessment  Pain Assessment: 0-10  Pain Level: 8  Pain Type: Chronic pain  Pain Location: Knee  Pain Orientation: Left  Pain Descriptors: Aching    Prior Level of Function:  Social/Functional History  Lives With: Spouse  Type of Home: House  Home Layout: One level  Bathroom Shower/Tub: Tub/Shower unit  Home Equipment: Roll About, Rolling walker, Cane  ADL Assistance: Independent  Homemaking Assistance: Independent  Homemaking Responsibilities: Yes(shared)  Ambulation Assistance: Independent(cane)  Transfer Assistance: Independent  Active : No  Occupation: On disability    OBJECTIVE:   Vision: Impaired  Vision Exceptions: Wears glasses at all times  Hearing: Within functional limits    Cognition:  Overall Orientation Status: Within Functional Limits  Follows Commands: Within Functional Limits    Observation/Palpation  Posture: Fair  Observation: dressing donned to L knee, KI adjusted for proper fit, appears to be inpain with movement    ROM:  RLE PROM: WFL  LLE General PROM: knee NT d/t high pain levels    Strength:  Strength RLE  Strength RLE: WFL  Strength LLE  Comment: -SLR, ankle WFL    Neuro:  Balance  Posture: Fair  Sitting - Static: Good  Sitting - Dynamic: Good;-  Standing - Static: Fair  Standing - Dynamic: Fair     Motor Control  Gross Motor?: WFL  Sensation  Overall Sensation Status: WFL(pt denies parasthesia)    Bed mobility  Supine to Sit: Minimal assistance(support LLE)  Sit to Supine: Unable to assess(pt in chair)  Comment: increased time and effort    Transfers  Sit to Stand: Contact guard assistance  Stand to sit: Contact guard assistance  Bed to Chair: Contact guard assistance    Ambulation  Ambulation?: Yes  WB Status: L LE WBAT; knee immob donned  Ambulation 1  Surface: level tile  Device: Rolling Walker  Assistance: Minimal assistance  Quality of Gait: downward gaze  Gait Deviations: Slow Diana; Increased SEA; Decreased step length;Decreased step height  Distance: 6ft  Comments: for walker sequencing, increased time and effort, VC for sequencing gait pattern especially turns and retro     Activity Tolerance  Activity Tolerance: Patient limited by pain    Exercises  Comments: instructed in anti embolics     PT Education  PT Education: Goals;PT Role;Plan of Care;Weight-bearing Education  Patient Education: knee immobilizer    ASSESSMENT:   Body structures, Functions, Activity limitations: Decreased functional mobility ; Decreased strength;Decreased ROM; Increased pain;Decreased balance  Decision Making: Medium Complexity  History: high  Exam: med  Clinical Presentation: med    Prognosis: Good  Patient Education: knee immobilizer  Barriers to Learning: none    DISCHARGE

## 2020-09-05 NOTE — DISCHARGE INSTR - COC
Continuity of Care Form    Patient Name: Den Wray   :  1970  MRN:  [id number]    Admit date:  2020  Discharge date:  20    Code Status Order: Full Code   Advance Directives:   [facility] Documentation     Date/Time Healthcare Directive Type of Healthcare Directive Copy in [facility] [address] Agent's Name Healthcare Agent's Phone Number    20 2219  No, patient does not have an advance directive for healthcare treatment -- -- -- -- --    20 1038  No, patient does not have an advance directive for healthcare treatment -- -- -- -- --          Admitting Physician:  Karie Oliva MD  PCP: Mingo Gonzales MD    Discharging Nurse: [facility] Unit/Room#: N224/N224-01  Discharging Unit Phone Number: [phone]    Emergency Contact:   Extended Emergency Contact Information  Primary Emergency Contact: Israel Mancuso  Address: [address] [place] [address].  [address] [place] [address] Phone: [phone]  Work Phone: [phone]  Mobile Phone: [phone]  Relation: Spouse  Secondary Emergency Contact: [address] Phone: [phone]  Work Phone: [phone]  Mobile Phone: [phone]  Relation: Brother/Sister    Past Surgical History:  Past Surgical History:   Procedure Laterality Date    ABDOMINAL HERNIA REPAIR      BREAST BIOPSY Bilateral     BREAST SURGERY Bilateral 2008    nipple and milk gland removal    CARPAL TUNNEL RELEASE Bilateral      SECTION      CHOLECYSTECTOMY      COLONOSCOPY  2019    DR BRADFORD Erazo [place]e FINGER TRIGGER RELEASE Bilateral     HYSTERECTOMY, TOTAL ABDOMINAL      KNEE ARTHROSCOPY Bilateral     LUMBAR SPINE SURGERY N/A 2017    L3-S1 DECOMPRESSION REMOVAL EPIDURAL MASS   performed by Alaina Herrera MD at [address] Left 2020    LEFT TOTAL KNEE REVISION WITH CPS POLY SWAP performed by Ether Severance, MD at [address]  1/6/14      [address] [facility] GASTROINTESTINAL ENDOSCOPY  3/24/15    [facility]    UPPER GASTROINTESTINAL ENDOSCOPY  04/08/2019    DR FELDER       Immunization History:   Immunization History   Administered Date(s) Administered    Hepatitis A 01/10/2006, 07/21/2010    Influenza Virus Vaccine 10/20/2004, 11/23/2013, 10/22/2014, 09/08/2015    Influenza, Michelle Toney, IM, (6 mo and older Fluzone, Flulaval, Fluarix and 3 yrs and older Afluria) 10/25/2016, 10/24/2017, 10/22/2018    Pneumococcal Conjugate 7-valent (Prevnar7) 11/23/2013    Pneumococcal Polysaccharide ([id number]) 01/23/2017    Tdap (Boostrix, Adacel) 10/24/2017       Active Problems:  Patient Active Problem List   Diagnosis Code    COPD (chronic obstructive pulmonary disease) ([facility] J44.9    Asthma J45.909    Anxiety disorder F41.9    Chronic low back pain M54.5, G89.29    Psoriasis L40.9    Chronic anemia D64.9    Chronic hepatitis C without hepatic coma (HCC) B18.2    Gout M10.9    Seizure disorder (HCC) G40.909    Intractable chronic cluster headache G44.021    DM type 2 with diabetic peripheral neuropathy (HCC) E11.42    Mixed hyperlipidemia E78.2    Essential hypertension I10    Vitamin D deficiency E55.9    Gastroesophageal reflux disease without esophagitis K21.9    Osteoarthritis of spine with radiculopathy, thoracolumbar region M47.25    Morbid obesity due to excess calories (HCC) E66.01    Arthralgia of left knee M25.562    Transitional vertebrae Q76.49    Lumbar radiculopathy M54.16    Lumbar canal stenosis M48.061    Arthritis of right knee M17.11    Epidural lipomatosis D17.79    Status post lumbar spine surgery for decompression of spinal cord Z98.890    Abscess in epidural space of L2-L5 lumbar spine G06.1    Fever R50.9    Back pain M54.9    Right lumbar radiculopathy M54.16    Lumbar stenosis with neurogenic claudication M48.062    Hypokalemia E87.6    Spasm of back muscles M62.830    Right foot drop M21.371    Pseudoclaudication syndrome M48.062    History of lumbosacral spine surgery Z98.890    Chronic insomnia F51.04    Adhesive arachnoiditis G03.9    Morbid obesity with BMI of 45.0-49.9, adult ([facility]) E66.01, Z68.42    Mild chronic gastritis K29.50    Polyp of sigmoid colon D12.5    Postlaminectomy syndrome of lumbar region M96.1    Status post revision of total knee replacement, left F67.802       Isolation/Infection:   Isolation          No Isolation        Patient Infection Status     Infection Onset Added Last Indicated Last Indicated By Review Planned Expiration Resolved Resolved By    None active    Resolved    COVID-19 Rule Out 08/28/20 08/28/20 08/28/20 Covid-19 Ambulatory (Ordered)   08/30/20 Rule-Out Test Resulted    C-diff Rule Out 03/17/20 03/17/20 03/17/20 Clostridium Difficile Toxin/Antigen (Ordered)   03/18/20 Rule-Out Test Resulted          Nurse Assessment:  Last Vital Signs: /64   Pulse 79   Temp 97.5 °F (36.4 °C) (Oral)   Resp 16   Ht 5' 1\" (1.549 m)   Wt 241 lb 6 oz (109.5 kg)   LMP  (LMP Unknown)   SpO2 99%   BMI 45.61 kg/m²     Last documented pain score (0-10 scale): Pain Level: 7  Last Weight:   Wt Readings from Last 1 Encounters:   09/04/20 241 lb 6 oz (109.5 kg)     Mental Status:  oriented, alert, thought processes intact and able to concentrate and follow conversation    IV Access:  - None    Nursing Mobility/ADLs:  Walking   Assisted  Transfer  Assisted  Bathing  Assisted  Dressing  Assisted  Toileting  Independent  Feeding  Independent  Med Admin  Independent  Med Delivery   whole    Wound Care Documentation and Therapy:        Elimination:  Continence:   · Bowel: Yes  · Bladder: Yes  Urinary Catheter: None   Colostomy/Ileostomy/Ileal Conduit: No       Date of Last BM: 09-05  No intake or output data in the 24 hours ending 09/05/20 1832  I/O last 3 completed shifts:   In: 300 [I.V.:300]  Out: - Safety Concerns:     History of Falls (last 30 days)    Impairments/Disabilities:      Vision and Difficulty with ambulation    Nutrition Therapy:  Current Nutrition Therapy:   - Oral Diet:  Carb Control 5 carbs/meal (2000kcals/day)    Routes of Feeding: Oral  Liquids: Thin Liquids  Daily Fluid Restriction: no  Last Modified Barium Swallow with Video (Video Swallowing Test): not done    Treatments at the Time of Hospital Discharge:   Respiratory Treatments: NA  Oxygen Therapy:  is not on home oxygen therapy. Ventilator:    - No ventilator support    Rehab Therapies: Physical Therapy and Occupational Therapy  Weight Bearing Status/Restrictions: No weight bearing restirctions  Other Medical Equipment (for information only, NOT a DME order): Walker and knee immobilizer  Other Treatments: Aquacel Dressing to left knee    Patient's personal belongings (please select all that are sent with patient):  Glasses    RN SIGNATURE:  Electronically signed by Bert De Jesus RN on 9/5/20 at 6:36 PM EDT    CASE MANAGEMENT/SOCIAL WORK SECTION    Inpatient Status Date: 09-04-20    Readmission Risk Assessment Score:  Readmission Risk              Risk of Unplanned Readmission:        12           Discharging to Facility/ Agency   · Name:   · Address:  · Phone:  · Fax:    Dialysis Facility (if applicable)   · Name:  · Address:  · Dialysis Schedule:  · Phone:  · Fax:    / signature: {Esignature:[id number]}    PHYSICIAN SECTION    Prognosis: Good    Condition at Discharge: Stable    Rehab Potential (if transferring to Rehab): Good    Recommended Labs or Other Treatments After Discharge: Follow-Up Appointments As Recommended    Physician Certification: I certify the above information and transfer of Maryjane Romo  is necessary for the continuing treatment of the diagnosis listed and that she requires [address] for less 30 days.      Update Admission H&P: No change in H&P    PHYSICIAN SIGNATURE: {[facility]:[id number]}

## 2020-09-05 NOTE — FLOWSHEET NOTE
Reviewed discharge instructions with patient including prescriptions, new medication education and to schedule F/U appointments. Patient verbalizes understanding of discharge instructions and states she is very satisfied with care, patient now discharged home with [facility]. Transport took patient down by wheelchair to husbands car in turn-around.

## 2020-09-05 NOTE — PROGRESS NOTES
[facility]ERAP[facility] ORTHOPEDIC TREATMENT NOTE     Date: 2020  Patient Name: Deepthi Rodriguez        MRN: [id number]  Account: [de-identified]   : 1970  (45 y.o.)  Room: [place] [address]    Chart Review:  Diagnosis:  The encounter diagnosis was Status post revision of total knee replacement, left. Restrictions:    Restrictions/Precautions  Restrictions/Precautions: Weight Bearing, Fall Risk  Required Braces or Orthoses?: Yes  Position Activity Restriction  Other position/activity restrictions: KI until + SLR    Subjective:  Patient states: \"My  is good. He helps me a lot. \"  Pain:  Start of tx:  Pre Treatment Pain Screening  Pain at present: 7  Scale Used: Numeric Score  Intervention List: Patient able to continue with treatment  Comments / Details: RN administered pain medication    End of tx:  Pain Assessment  Patient Currently in Pain: Yes  Pain Assessment: 0-10  Pain Level: 6  Pain Type: Surgical pain  Pain Location: Knee  Pain Orientation: Left  Pain Descriptors: Aching  Pain Frequency: Continuous  Clinical Progression: Gradually improving  Response to Pain Intervention: Patient Satisfied    Objective: Sit <> Stand transfers SBA. Pt performed functional mobility throughout room environment using a FWW with SBA. ADL shower session completed as follows. ADL  ADL  Grooming: Supervision, Setup (To perform oral care and hair care while standing at sink)  UE Bathing: Modified independent (use of detachable shower head)  LE Bathing: Supervision  UE Dressing: Setup (To don t-shirt)  LE Dressing: Supervision, Increased time to complete, Setup (To doff/don bilateral socks and don underwear/shorts. Pt able to bend down in order to don LB clothing even with brace donned. Shower Transfers  Shower - Transfer From: Lorenzo Gutierrez - Transfer Type: To and From  Shower - Transfer To:  Shower seat with back  Shower - Technique: Ambulating  Shower Transfers: Stand by assistance      Therapy key for assistance levels -   Independent = Pt. is able to perform task with no assistance but may require a device   Stand by assistance = Pt. does not perform task at an independent level but does not need physical assistance, requires verbal cues  Minimal, Moderate, Maximal Assistance = Pt. requires physical assistance (25%, 50%, 75% assist from helper) for task but is able to actively participate in task   Dependent = Pt. requires total assistance with task and is not able to actively participate with task completion    Cognition:  Cognition  Overall Cognitive Status: WFL    Treatment consisted of:  ADL training    Assessment/Discharge Disposition: Pt tolerated treatment well. Pt required increased time for transfers and mobility. Assessment: Pt is a 45 y.o. female s/p left total knee revision. Pt with above mentioned deficits impairing ability to complete ADL's at reported baseline IND level. Pt may benefit from OT services to address deficits and maximize function. Performance deficits / Impairments: Decreased functional mobility , Decreased ADL status, Decreased strength, Decreased high-level IADLs, Decreased endurance  Discharge Recommendations: Continue to assess pending progress  History: multi comorb  Exam: 5 perf imp  Assistance / Modification:  Mod A    [employer]  AM-PAC Daily Activity Inpatient   How much help for putting on and taking off regular lower body clothing?: A Little  How much help for Bathing?: A Little  How much help for Toileting?: A Little  How much help for putting on and taking off regular upper body clothing?: None  How much help for taking care of personal grooming?: None  How much help for eating meals?: None  AM-[facility] Inpatient Daily Activity Raw Score: 21  AM-PAC Inpatient ADL T-Scale Score : 44.27  ADL Inpatient CMS 0-100% Score: 32.79  ADL Inpatient CMS G-Code Modifier : CJ    Plan:  Continue OT per POC    Goals/Plan of care addressed during this session:  Improve [place] with ADLs and Improve [place] with Functional Transfers    Minutes:  OT Individual Minutes  Time In: 4337  Time Out: 1120  Minutes: 39    ADL trainin minutes    Electronically signed by:    CHIOMA Tracey    2020, 11:45 AM

## 2020-09-05 NOTE — PROGRESS NOTES
Hospitalist Progress Note      PCP: Madhavi Gottlieb MD    Date of Admission: 9/4/2020    Chief Complaint:    No chief complaint on file. Subjective:  Patient denies fevers, chills, sweats, CP, SOB. 12 point ROS negative other than mentioned above     Medications:  Reviewed    Infusion Medications    lactated ringers 50 mL/hr at 09/04/20 2022    dextrose       Scheduled Medications    sodium chloride flush  10 mL Intravenous 2 times per day    acetaminophen  650 mg Oral Q6H    senna-docusate  1 tablet Oral BID    aspirin  81 mg Oral BID    atorvastatin  40 mg Oral Daily    carvedilol  6.25 mg Oral BID WC    budesonide-formoterol  2 puff Inhalation BID    furosemide  40 mg Oral Daily    gabapentin  800 mg Oral 4x Daily    glipiZIDE  5 mg Oral QAM AC    insulin lispro  10 Units Subcutaneous TID WC    insulin glargine  40 Units Subcutaneous Nightly    latanoprost  1 drop Both Eyes Nightly    linaclotide  290 mcg Oral QAM AC    iron polysaccharides  1 capsule Oral Daily    metFORMIN  1,000 mg Oral BID WC    nortriptyline  10 mg Oral Nightly    pantoprazole  40 mg Oral QAM AC    vitamin B-12  1,000 mcg Oral Daily    topiramate  200 mg Oral BID    potassium chloride  20 mEq Oral BID     PRN Meds: sodium chloride flush, oxyCODONE **OR** oxyCODONE, morphine, magnesium hydroxide, ondansetron **OR** ondansetron, famotidine (PEPCID) injection, diazePAM, glucose, dextrose, glucagon (rDNA), dextrose      Intake/Output Summary (Last 24 hours) at 9/5/2020 1337  Last data filed at 9/4/2020 1525  Gross per 24 hour   Intake 1300 ml   Output --   Net 1300 ml       Exam:    /63   Pulse 83   Temp 97.5 °F (36.4 °C) (Oral)   Resp 17   Ht 5' 1\" (1.549 m)   Wt 241 lb 6 oz (109.5 kg)   LMP  (LMP Unknown)   SpO2 96%   BMI 45.61 kg/m²     General appearance: No apparent distress, appears stated age and cooperative. HEENT:  Conjunctivae/corneas clear. Neck:  Trachea midline.   Respiratory:  Normal respiratory effort. Clear to auscultation  Cardiovascular: Regular rate and rhythm   Abdomen: Soft, non-tender, non-distended with normal bowel sounds. Musculoskeletal: Knee in brace  Neuro: Non Focal.   Capillary Refill: Brisk,< 3 seconds   Peripheral Pulses: +2 palpable, equal bilaterally     Labs:   Recent Labs     09/05/20 0627   WBC 7.3   HGB 11.6*   HCT 36.5*   *     Recent Labs     09/05/20 0627      K 4.1   *   CO2 20   BUN 10   CREATININE 0.65   CALCIUM 8.1*     No results for input(s): AST, ALT, BILIDIR, BILITOT, ALKPHOS in the last 72 hours. No results for input(s): INR in the last 72 hours. No results for input(s): Justin Lust in the last 72 hours. Urinalysis:      Lab Results   Component Value Date    NITRU Negative 08/28/2020    WBCUA 2 02/09/2019    BACTERIA Few 02/09/2019    RBCUA 2 02/09/2019    BLOODU Negative 08/28/2020    SPECGRAV 1.013 08/28/2020    GLUCOSEU Negative 08/28/2020       Radiology:  XR KNEE LEFT (1-2 VIEWS)   Final Result   Impression:Total Left knee arthroplasty in normal alignment. Assessment/Plan:    1. Status Post Revision of Left knee replacement: Pain management and therapy per Orthopedic surgery. 2. CAD/HTN/HLD: Stable. Continue home meds  3. DMII with hyperglycemia: ISS, hypoglycemia protocol POCT Glucose TIDAC & QHS   4. COPD, Stable. Duonebs PRN. Incentive Spirometry, Respiratory therapist assessment. Resume home meds. 5. Iron deficiency anemia: Iron supplementation     Active Hospital Problems    Diagnosis Date Noted    Status post revision of total knee replacement, left [Z96.652] 09/04/2020       Additional work up or/and treatment plan may be added today or then after based on clinical progression. I am managing a portion of pt care. Some medical issues are handled by other specialists. Additional work up and treatment should be done in out pt setting by pt PCP and other out pt providers.      In addition to examining and evaluating pt, I spent additional time explaining care, normal and abnormal findings, and treatment plan. All of pt questions were answered. Counseling, diet and education were  provided. Case will be discussed with nursing staff when appropriate. Family will be updated if and when appropriate.       Diet: DIET GENERAL;    Code Status: Full Code    PT/OT Eval     Electronically signed by Aliyah Bates MD on 9/5/2020 at 1:37 PM

## 2020-09-05 NOTE — PROGRESS NOTES
Progress Note   TKA    Subjective:     Post-Operative Day: 1 Status Post left Total Knee Arthroplasty  Systemic or Specific Complaints:No Complaints    Objective:     CURRENT VITALS:  /63   Pulse 83   Temp 97.5 °F (36.4 °C) (Oral)   Resp 17   Ht 5' 1\" (1.549 m)   Wt 241 lb 6 oz (109.5 kg)   LMP  (LMP Unknown)   SpO2 96%   BMI 45.61 kg/m²     General: alert, appears stated age and cooperative   Wound: Wound clean and dry no evidence of infection. Motion: Painful range of Motion   DVT Exam: No evidence of DVT seen on physical exam.       Knee swollen but thigh soft to palpation. Moving foot and ankle. Good distal pulses. Data Review  Recent Labs     09/05/20  0627   WBC 7.3   RBC 4.13*   HGB 11.6*   HCT 36.5*   MCV 88.5   MCH 28.0   MCHC 31.7*   RDW 15.0*   *         Assessment:     Status Post left Total Knee Arthroplasty. Doing well postoperatively.      Plan:      1: Continues current post-op course, ok with d/c if stable  2:  Continue Deep venous thrombosis prophylaxis  3:  Continue physical therapy  4:  Continue Pain Control

## 2020-09-05 NOTE — PLAN OF CARE
Problem: Falls - Risk of:  Goal: Will remain free from falls  Description: Will remain free from falls  Outcome: Ongoing  Goal: Absence of physical injury  Description: Absence of physical injury  Outcome: Ongoing     Problem: Mobility - Impaired:  Goal: Mobility will improve  Description: Mobility will improve  Outcome: Ongoing     Problem: Pain:  Goal: Pain level will decrease  Description: Pain level will decrease  Outcome: Ongoing  Goal: Control of acute pain  Description: Control of acute pain  Outcome: Ongoing  Goal: Control of chronic pain  Description: Control of chronic pain  Outcome: Ongoing

## 2020-09-07 LAB
BODY FLUID CULTURE, STERILE: NORMAL
CULTURE SURGICAL: NORMAL

## 2020-09-08 NOTE — PROGRESS NOTES
Physical Therapy  Facility/Department: [facility] MED SURG [id number]  Physical Therapy Discharge      NAME: Marli Denny    : 1970 (45 y.o.)  MRN: [id number]    Account: [de-identified]  Gender: female      Patient has been discharged from acute care hospital. DC patient from current PT program.      Electronically signed by Heike Spivey PT on 20 at 3:30 PM EDT

## 2020-09-29 RX ORDER — ASPIRIN 81 MG/1
81 TABLET ORAL 2 TIMES DAILY
Qty: 60 TABLET | Refills: 0 | Status: SHIPPED | OUTPATIENT
Start: 2020-09-29 | End: 2020-10-29

## 2020-10-08 ENCOUNTER — TELEPHONE (OUTPATIENT)
Dept: INFECTIOUS DISEASES | Age: 50
End: 2020-10-08

## 2020-10-09 LAB
AFP-TUMOR MARKER: 6 NG/ML (ref 0–9)
ALBUMIN: 4.1 G/DL (ref 3.4–5)
ALP BLD-CCNC: 75 U/L (ref 33–110)
ALT SERPL-CCNC: 21 U/L (ref 7–45)
ANION GAP SERPL CALCULATED.3IONS-SCNC: 13 MMOL/L (ref 10–20)
AST SERPL-CCNC: 19 U/L (ref 9–39)
BICARBONATE: 22 MMOL/L (ref 21–32)
BILIRUB SERPL-MCNC: 0.3 MG/DL (ref 0–1.2)
CALCIUM SERPL-MCNC: 8.9 MG/DL (ref 8.6–10.3)
CHLORIDE BLD-SCNC: 109 MMOL/L (ref 98–107)
CREAT SERPL-MCNC: 0.77 MG/DL (ref 0.5–1)
GFR AFRICAN AMERICAN: >60 ML/MIN/1.73M2
GFR NON-AFRICAN AMERICAN: >60 ML/MIN/1.73M2
GLUCOSE: 94 MG/DL (ref 74–99)
HEPATITIS B SURFACE ANTIGEN: NONREACTIVE
POTASSIUM SERPL-SCNC: 3.8 MMOL/L (ref 3.5–5.3)
SODIUM BLD-SCNC: 140 MMOL/L (ref 136–145)
SPECIMEN SOURCE: NORMAL
SPECIMEN SOURCE: NORMAL
TOTAL PROTEIN: 8.1 G/DL (ref 6.4–8.2)
UREA NITROGEN: 14 MG/DL (ref 6–23)

## 2020-10-13 LAB
ERYTHROCYTE [DISTWIDTH] IN BLOOD BY AUTOMATED COUNT: 16.4 % (ref 11.5–14)
HCT VFR BLD CALC: 36.2 % (ref 36–46)
HEMOGLOBIN: 11 G/DL (ref 12–16)
HEPATITIS BE ANTIGEN: NEGATIVE
MCHC RBC AUTO-ENTMCNC: 30.4 G/DL (ref 32–36)
MCV RBC AUTO: 90 FL (ref 80–100)
PLATELET # BLD: 134 X10E9/L (ref 150–450)
RBC # BLD: 4.04 X10E12/L (ref 4–5.2)
WBC: 6.7 X10E9/L (ref 4.4–11.3)

## 2020-10-15 LAB
HBV DNA IU/ML: NOT DETECTED IU/ML
HEP B PCR INTERP: NORMAL
Lab: NORMAL LOG IU/ML

## 2020-10-23 ENCOUNTER — HOSPITAL ENCOUNTER (OUTPATIENT)
Dept: ULTRASOUND IMAGING | Age: 50
Discharge: HOME | End: 2020-10-25
Payer: COMMERCIAL

## 2020-10-23 PROCEDURE — 76705 ECHO EXAM OF ABDOMEN: CPT

## 2020-11-11 ENCOUNTER — VIRTUAL VISIT (OUTPATIENT)
Dept: INFECTIOUS DISEASES | Age: 50
End: 2020-11-11
Payer: COMMERCIAL

## 2020-11-11 PROCEDURE — 1036F TOBACCO NON-USER: CPT | Performed by: INTERNAL MEDICINE

## 2020-11-11 PROCEDURE — G8417 CALC BMI ABV UP PARAM F/U: HCPCS | Performed by: INTERNAL MEDICINE

## 2020-11-11 PROCEDURE — G8484 FLU IMMUNIZE NO ADMIN: HCPCS | Performed by: INTERNAL MEDICINE

## 2020-11-11 PROCEDURE — G8428 CUR MEDS NOT DOCUMENT: HCPCS | Performed by: INTERNAL MEDICINE

## 2020-11-11 PROCEDURE — 3017F COLORECTAL CA SCREEN DOC REV: CPT | Performed by: INTERNAL MEDICINE

## 2020-11-11 PROCEDURE — 99213 OFFICE O/P EST LOW 20 MIN: CPT | Performed by: INTERNAL MEDICINE

## 2020-11-11 ASSESSMENT — ENCOUNTER SYMPTOMS: ABDOMINAL PAIN: 1

## 2020-11-11 NOTE — PROGRESS NOTES
Subjective:      Patient ID: Ana Varghese is a 45 y.o. female. HPI  Patient was informed that this is a billable visit. I am in my office, patient is at home/NH. Shweta. me/ [facility] [employer] was used for communication and exam. Chart was reviewed and labs/ X Rays were discussed with the patient. our practice is making every effort to adhere to current recommendations, including social distancing. For the health and safety of our patients, and to prevent unnecessary exposure, we are currently scheduling telephone appointments. Patient was informed that these appointments are official appointments and will be billed through patient's insurance . Patient confirms this and agreed to the televised visit. Time spend in review of chart and on the IPAD using Doxy. me was 15  minutes. Follow-up chronic active hepatitis C type I with sustained viral response status post Harvoni February 2016 stage F2. Review of Systems   Constitutional: Positive for unexpected weight change (weight gain). Cardiovascular: Positive for leg swelling (Bilateral). Gastrointestinal: Positive for abdominal pain (RUQ abdominal pain, sharp, lasts for few minutes, 3-4 times monthly). Musculoskeletal: Positive for arthralgias (B knees). All other systems reviewed and are negative. lot of flattus  Has IBS  Has lactose intolerance  Objective:   Physical Exam  Constitutional:       General: She is not in acute distress. HENT:      Head: Normocephalic. Nose: No congestion. Eyes:      General: No scleral icterus. Pulmonary:      Effort: Pulmonary effort is normal. No respiratory distress. Abdominal:      General: There is no distension. Musculoskeletal:         General: Swelling present. Skin:     Coloration: Skin is not jaundiced. Findings: No erythema or rash. Neurological:      Mental Status: She is alert and oriented to person, place, and time.    Psychiatric:         Mood and Affect: Mood normal.         Behavior: Behavior normal.       10/13/2020  3:49 PM - [facility], Lab In [facility]     Component  Value  Ref Range & Units  Status  Collected  Lab    WBC  6.7  4.4 - 11.3 x10E9/L  Final  10/13/2020  2:48 PM  [facility]    RBC  4.04  4.00 - 5.2 x10E12/L  Final  10/13/2020  2:48 PM  [facility]    Hemoglobin  11.0Low    12.0 - 16 g/dL  Final  10/13/2020  2:48 PM  [facility]    Hematocrit  36.2  36.0 - 46 %  Final  10/13/2020  2:48 PM  [facility]    MCV  90  80 - 100 fL  Final  10/13/2020  2:48 PM  [facility]    MCHC  30.4Low    32.0 - 36 g/dL  Final  10/13/2020  2:48 PM  [facility]    Platelets  715MFY    150 - 450 x10E9/L  Final  10/13/2020  2:48 PM  [facility]    RDW-CV  16.4High    11.5 - 14 %  Final  10/13/2020  2:48 PM  [address]    Testing Performed By     Lab - [address]  Name  Director  Address  Valid Date Range   10/13/2020  4:06 AM - [facility], Lab In [facility]     Component  Value  Ref Range & Units  Status  Collected  Lab    Hep B E Ag  Negative  Negative      10/9/2020 11:43 PM - [facility] In [facility]     Component  Value  Ref Range & Units  Status  Collected  Lab    Hepatitis B Surface Ag  NONREACTIVE  NONREACTIV  Final  10/09/2020  1:54 PM  [facility]    Specimen Source           10/9/2020 11:27 PM - [facility], Lab In [facility]     Component  Value  Ref Range & Units  Status  Collected  Lab    AFP-Tumor Marker  6  0 - 9 ng/mL  Final  10/09/2020  1:54 PM  [address]    Specimen Source    Final  10/09/2020  1:54 PM  [address]    Testing Performed By     Lab - Abbreviation  Name  Director  Address     CMP normal    Impression         NO DEFINITE LIVER ABNORMALITY.         MILD INCREASED ECHOGENICITY OF LIVER PARENCHYMA, POSSIBLE LIVER STEATOSIS.         STATUS POST CHOLECYSTECTOMY.         NO BILE DUCT DILATATION.         Assessment:      Chronic active hepatitis C 1,SVR S/P Harvoni 2016  F/U [facility], cured  Hep B infection  + Cirrhosis by Fibroscan  F2 by Fibrosure        Plan:      F/U with GI for abdominal pain  CBC CMP HCV VL AFP  in 6 months  Liver U/S in 6 months        Conan Sever A Ethan Gonzalez MD

## 2020-11-20 ENCOUNTER — VIRTUAL VISIT (OUTPATIENT)
Dept: GASTROENTEROLOGY | Age: 50
End: 2020-11-20
Payer: COMMERCIAL

## 2020-11-20 PROCEDURE — 99443 PR PHYS/QHP TELEPHONE EVALUATION 21-30 MIN: CPT | Performed by: SPECIALIST

## 2020-11-20 NOTE — PROGRESS NOTES
Gastroenterology Clinic Visit    Shayy Serrano  [id number]  Chief Complaint   Patient presents with    Consultation     Hep C diagnosis, this is not a new diagnosis. She was treated a few years ago for this. HPI: 45 y.o. female presents to the clinic with history of chronic hep C and cirrhosis. ,  This was a telephone encounter and patient was at home and I was in our office. She was referred to us for cirrhosis. Patient reports some epigastric discomfort. No nausea no vomiting no history of any GI bleeding, no symptoms of any ascites or leg swelling, serology for chronic hepatitis B was negative, patient states that she was treated for hep C by Dr. Manny Garcia, and is being followed by her. Patient also stated that she was recently diagnosed to have Covid. No fever no chills no history of any jaundice. She is currently on Lasix 40 mg a day. Duration of phone call was 21 minutes, social history she used to drink in the past she has been abstaining from alcohol does not smoke, last EGD was about 3 years ago.     Review of Systems     Past Medical History:   Diagnosis Date    Abnormal cardiovascular stress test 4/20/2015    Anemia     Anxiety disorder     Anxiety disorder     Arthritis     Asthma     Cerebral artery occlusion with cerebral infarction ([address]     TIZ x 3    Chest pain 11/3/2014    Chronic hepatitis B ([address] 12/12/2013    Chronic hepatitis C without hepatic coma (HCC) 12/12/2013    Chronic low back pain     Cirrhosis (HCC)     COPD (chronic obstructive pulmonary disease) (HCC)     Dizziness 4/20/2015    DM type 2 with diabetic peripheral neuropathy ([address] 4/16/2016    DM type 2 with diabetic peripheral neuropathy (HCC) 4/16/2016    Epidural lipomatosis     GERD (gastroesophageal reflux disease)     Gout     Headache 4/20/2015    Headache 4/20/2015    Hepatitis C     History of blood transfusion 2016?    no reaction     History of peptic ulcer disease     History of seizures  History of TIA (transient ischemic attack)     Hyperlipidemia     Hypertension     Lumbar stenosis     Obesity     Osteoarthritis of spine with radiculopathy, thoracolumbar region     Psoriasis     Seizure disorder ([facility] [address]     SOB (shortness of breath) 2015    Type II or unspecified type diabetes mellitus without mention of complication, not stated as uncontrolled       Past Surgical History:   Procedure Laterality Date    ABDOMINAL HERNIA REPAIR      BREAST BIOPSY Bilateral     BREAST SURGERY Bilateral 2008    nipple and milk gland removal    CARPAL TUNNEL RELEASE Bilateral      SECTION      CHOLECYSTECTOMY      COLONOSCOPY  2019    DR FELDER   [place] FINGER TRIGGER RELEASE Bilateral     HYSTERECTOMY, TOTAL ABDOMINAL      KNEE ARTHROSCOPY Bilateral     LUMBAR SPINE SURGERY N/A 2017    L3-S1 DECOMPRESSION REMOVAL EPIDURAL MASS   performed by Ree Ellis MD at [address] Left 2020    LEFT TOTAL KNEE REVISION WITH CPS POLY SWAP performed by Claudean Glaser, MD at [address] [facility]  14     [address] [facility] GASTROINTESTINAL ENDOSCOPY  3/24/15    [facility]    UPPER GASTROINTESTINAL ENDOSCOPY  2019    DR FELDER     Current Outpatient Medications on File Prior to Visit   Medication Sig Dispense Refill    gabapentin (NEURONTIN) 800 MG tablet Take 1 tablet by mouth 4 times daily for 30 days. 120 tablet 0    tiZANidine (ZANAFLEX) 2 MG tablet Take 1 tablet by mouth 2 times daily as needed (pain spasms) 60 tablet 0    lidocaine (LIDODERM) 5 % Place 1 patch onto the skin daily 12 hours on, 12 hours off. 30 patch 0    oxyCODONE (ROXICODONE) 5 MG immediate release tablet Take 1 tablet by mouth every 4 hours as needed for Pain for up to 30 days.  45 tablet 0    oxybutynin (DITROPAN-XL) 10 MG extended release tablet TAKE 1 TABLET BY MOUTH EVERY DAY 30 tablet 6    latanoprost (XALATAN) 0.005 % ophthalmic solution INSTILL 1 DROP INTO BOTH EYES IN THE EVENING      carvedilol (COREG) 6.25 MG tablet Take 6.25 mg by mouth 2 times daily (with meals)      NONFORMULARY Ibuprofen 10% ; Amitriptylline 2% ; Lidocaine 4% ; Baclofen 2%  Sig: Apply topically 1-2 grams to the affected area 3-4 times daily. Rub in well for 1-2 minutes.   Quantity to Dispense: 120gm  Refills: 5      atorvastatin (LIPITOR) 40 MG tablet TAKE 1 TABLET BY MOUTH DAILY 30 tablet 0    glipiZIDE (GLUCOTROL XL) 5 MG extended release tablet Take 1 tablet by mouth daily 30 tablet 0    vitamin D (ERGOCALCIFEROL) 24100 units CAPS capsule TAKE 1 CAPSULE BY MOUTH 1 TIME A WEEK 12 capsule 1    HUMALOG 100 UNIT/ML injection vial ADMINISTER 10 UNITS UNDER THE SKIN THREE TIMES DAILY BEFORE MEALS 10 mL 3    potassium chloride (KLOR-CON M) 20 MEQ extended release tablet TAKE 1 TABLET BY MOUTH TWICE DAILY 180 tablet 0    LINZESS 290 MCG CAPS capsule TAKE 1 CAPSULE BY MOUTH EVERY MORNING BEFORE BREAKFAST 30 capsule 5    furosemide (LASIX) 40 MG tablet TAKE 1 TABLET BY MOUTH DAILY 90 tablet 1    ONE TOUCH ULTRA TEST strip TEST THREE TIMES DAILY 300 each 3    Iron Combinations (NIFEREX) TABS Take 1 tablet by mouth daily 90 tablet 1    vitamin B-12 (CYANOCOBALAMIN) 1000 MCG tablet Take 1,000 mcg by mouth daily      meloxicam (MOBIC) 15 MG tablet Take 1 tablet by mouth daily 90 tablet 1    metFORMIN (GLUCOPHAGE) 1000 MG tablet TAKE 1 TABLET BY MOUTH TWICE DAILY WITH MEALS 180 tablet 1    omeprazole (PRILOSEC) 40 MG delayed release capsule Take 1 capsule by mouth daily 90 capsule 1    ONE TOUCH ULTRA TEST strip TEST THREE TIMES DAILY 300 strip 3    butalbital-acetaminophen-caffeine (FIORICET, ESGIC) -40 MG per tablet Take 1 tablet by mouth 2 times daily as needed for Headaches      albuterol sulfate HFA (VENTOLIN HFA) 108 (90 Base) MCG/ACT inhaler Inhale 2 puffs into the lungs every 6 hours as needed for Wheezing      topiramate (TOPAMAX) 200 MG tablet Take 200 mg by mouth 2 times daily      fluticasone-salmeterol (ADVAIR HFA) 45-21 MCG/ACT inhaler Inhale 2 puffs into the lungs 2 times daily      aspirin 81 MG EC tablet Take 1 tablet by mouth 2 times daily 60 tablet 0    Misc. Devices (CANE) MISC 1 Units by Does not apply route once for 1 dose 1 each 0    nortriptyline (PAMELOR) 10 MG capsule Take 1 capsule by mouth nightly 30 capsule 0    insulin detemir (LEVEMIR FLEXTOUCH) 100 UNIT/ML injection pen Inject 40 Units into the skin nightly 40 mL 1     No current facility-administered medications on file prior to visit.       Family History   Problem Relation Age of Onset    Diabetes Mother     Heart Disease Mother     High Blood Pressure Mother     High Cholesterol Mother     Kidney Disease Mother     Arthritis Mother     Glaucoma Mother     Cataracts Mother     Seizures Mother     Heart Failure Mother     Cancer Father         lung    Diabetes Sister     High Blood Pressure Sister     Liver Disease Sister     Other Sister         flesh eating bacteria    Cancer Sister     High Blood Pressure Brother     Diabetes Brother     Heart Attack Brother     High Cholesterol Brother     Heart Disease Brother     COPD Brother     Heart Failure Brother     Other Brother         gout    Cancer Sister         leukemia    Other Brother         gout, PVD    Diabetes Brother     High Blood Pressure Brother     No Known Problems Son     No Known Problems Daughter       Social History     Socioeconomic History    Marital status:      Spouse name: Not on file    Number of children: Not on file    Years of education: Not on file    Highest education level: Not on file   Occupational History    Not on file   Social Needs    Financial resource strain: Not on file    Food insecurity     Worry: Not on file     Inability: Not on file    Transportation needs     Medical: Not on file     Non-medical: Not on file   Tobacco Use    Smoking status: Former Smoker     Packs/day: 0.50     Types: Cigarettes     Start date: 1997     Last attempt to quit: 2009     Years since quittin.8    Smokeless tobacco: Never Used   Substance and Sexual Activity    Alcohol use: No    Drug use: No    Sexual activity: Not on file   Lifestyle    Physical activity     Days per week: Not on file     Minutes per session: Not on file    Stress: Not on file   Relationships    Social connections     Talks on phone: Not on file     Gets together: Not on file     Attends Zoroastrianism service: Not on file     Active member of club or organization: Not on file     Attends meetings of clubs or organizations: Not on file     Relationship status: Not on file    Intimate partner violence     Fear of current or ex partner: Not on file     Emotionally abused: Not on file     Physically abused: Not on file     Forced sexual activity: Not on file   Other Topics Concern    Not on file   Social History Narrative    Not on file       not currently breastfeeding. Physical Exam    Laboratory, Pathology, Radiology reviewed indetail with relevant important investigations summarized below:  Lab Results   Component Value Date    WBC 6.7 10/13/2020    HGB 11.0 (L) 10/13/2020    HCT 36.2 10/13/2020    MCV 90 10/13/2020     (L) 10/13/2020     Lab Results   Component Value Date    ALT 21 10/09/2020    AST 19 10/09/2020    ALKPHOS 75 10/09/2020    BILITOT 0.3 10/09/2020       Us Abdomen Limited    Result Date: 10/23/2020  ULTRASOUND, LIMITED ABDOMEN: COMPARISONS:  NONE CLINICAL HISTORY: Alcoholic liver cirrhosis without ascites. Chronic hepatitis. Hepatitis B. History cholecystectomy. FINDINGS: Biplanar images were obtained. The gallbladder has been resected. The common bile duct measures up to  1.6 mm in diameter. No intrahepatic bile duct dilatation is seen. No focal liver lesions are noted. Mild increased echogenicity of liver parenchyma.  Portions of the pancreas visualized have a normal appearance. Tail and head of pancreas are not well visualized. No free fluid is seen within Morison?s pouch. NO DEFINITE LIVER ABNORMALITY. MILD INCREASED ECHOGENICITY OF LIVER PARENCHYMA, POSSIBLE LIVER STEATOSIS. STATUS POST CHOLECYSTECTOMY. NO BILE DUCT DILATATION. Endoscopic investigations:     Assessmentand Plan:  45 y.o. female with history of cirrhosis secondary to alcohol use and chronic hep C which was treated and is being followed by Dr. Che Marks. Patient is due for surveillance EGD. Since she was recently diagnosed to have Covid we will have to wait for few weeks and will need a follow-up negative Covid test prior to scheduling EGD. Diagnosis Orders   1. Alcoholic cirrhosis of liver without ascites ([facility] [address]  EGD     No follow-ups on file. eLeanne Goodman MD   Staff Gastroenterologist  [facility]    Please note this report has been partially produced using speech recognition software and may cause contain errors related to thatsystem including grammar, punctuation and spelling as well as words and phrases that may seem inappropriate. If there are questions or concerns please feel free to contact me to clarify.

## 2020-12-15 LAB — PROCALCITONIN: 0.03 NG/ML

## 2020-12-16 LAB — VANCOMYCIN: 7.7 UG/ML

## 2020-12-17 LAB — VANCOMYCIN TROUGH: 24.3 UG/ML (ref 5–20)

## 2020-12-18 LAB — VANCOMYCIN TROUGH: 9.6 UG/ML (ref 5–20)

## 2020-12-20 LAB — VANCOMYCIN TROUGH: 13.7 UG/ML (ref 5–20)

## 2020-12-22 RX ORDER — OXYBUTYNIN CHLORIDE 10 MG/1
TABLET, EXTENDED RELEASE ORAL
Qty: 90 TABLET | Refills: 2 | Status: SHIPPED | OUTPATIENT
Start: 2020-12-22

## (undated) DEVICE — TRAY CATH 16FR F INCLUDE LUB DRNGE BG STATLOK STBL DEV

## (undated) DEVICE — SIPS DUAL 2 MINUTE TIP

## (undated) DEVICE — SHEET,DRAPE,53X77,STERILE: Brand: MEDLINE

## (undated) DEVICE — INTENDED FOR TISSUE SEPARATION, AND OTHER PROCEDURES THAT REQUIRE A SHARP SURGICAL BLADE TO PUNCTURE OR CUT.: Brand: BARD-PARKER ® CARBON RIB-BACK BLADES

## (undated) DEVICE — SUTURE VCRL SZ 2-0 L36IN ABSRB UD L36MM CT-1 1/2 CIR J945H

## (undated) DEVICE — HYPODERMIC SAFETY NEEDLE: Brand: MAGELLAN

## (undated) DEVICE — SUTURE VCRL SZ 0 L27IN ABSRB UD L26MM CP-2 1/2 CIR SGL J870H

## (undated) DEVICE — DISCONTINUED USE 393278 SYRINGE 10 ML HYPO W/O NDL LL TP PLSTC ST

## (undated) DEVICE — HOOD: Brand: FLYTE, SURGICOOL

## (undated) DEVICE — GAUZE,SPONGE,2"X2",8PLY,STERILE,LF,2'S: Brand: MEDLINE

## (undated) DEVICE — PENCIL SMK EVAC 10 FT BLADE ELECTRD ROCKER FOR TELSCP

## (undated) DEVICE — Z DISCONTINUED USE 2744636  DRESSING AQUACEL 14 IN ALG W3.5XL14IN POLYUR FLM CVR W/ HYDRCOLL

## (undated) DEVICE — 3M™ STERI-DRAPE™ U-DRAPE 1015: Brand: STERI-DRAPE™

## (undated) DEVICE — CORD BPLR 2 PIN FLAT AND RND DISP

## (undated) DEVICE — SPONGE DRN W4XL4IN RAYON/POLYESTER 6 PLY NONWOVEN PRECUT

## (undated) DEVICE — X-RAY DETECTABLE SPONGES,16 PLY: Brand: VISTEC

## (undated) DEVICE — BANDAGE COMPR M W6INXL10YD WHT BGE VELC E MTRX HK AND LOOP

## (undated) DEVICE — GLOVE ORANGE PI 8   MSG9080

## (undated) DEVICE — KAIRISON TUBING SET PNEUMATIC, (3000 MM), STERILE, DISPOSABLE, TO BE USED WITH: FK898R, PACKAGE OF 10 PIECES: Brand: KAIRISON

## (undated) DEVICE — SPLINT KNEE UNIV FOR LESS THAN 36IN L20IN FOAM LAM E CNTCT

## (undated) DEVICE — BLADE SAW W125XL70MM THK064MM CUT THK094MM REPL RECIP DBL

## (undated) DEVICE — GLOVE SURG 7.5 LTX TRIFLEX WIDE FINGER PWDR

## (undated) DEVICE — SPONGE GZ W4XL4IN COT 12 PLY TYP VII WVN C FLD DSGN

## (undated) DEVICE — GOWN,AURORA,NONREINFORCED,LARGE: Brand: MEDLINE

## (undated) DEVICE — CHLORAPREP 26ML ORANGE

## (undated) DEVICE — SYSTEM SKIN CLSR 22CM DERMBND PRINEO

## (undated) DEVICE — GLOVE ORANGE PI 7 1/2   MSG9075

## (undated) DEVICE — SLEEVE CMPR SM STD CALF SCD ANEMB LF

## (undated) DEVICE — PADDING CAST W6INXL4YD RAYON UNDERCAST SOF-ROL

## (undated) DEVICE — 3M™ TEGADERM™ TRANSPARENT FILM DRESSING FRAME STYLE, 1626W, 4 IN X 4-3/4 IN (10 CM X 12 CM), 50/CT 4CT/CASE: Brand: 3M™ TEGADERM™

## (undated) DEVICE — CODMAN® SURGICAL PATTIES 1/2" X 3" (1.27CM X 7.62CM): Brand: CODMAN®

## (undated) DEVICE — SYRINGE BLB 50CC IRRIG PLIABLE FNGR FLNG GRAD FLSK DISP

## (undated) DEVICE — SUTURE VCRL + SZ 3-0 L18IN ABSRB UD PS-2 3/8 CIR REV CUT VCP497H

## (undated) DEVICE — ABSORBENT ROLL ECODRI-SAFE

## (undated) DEVICE — CATHETER IV 16GA 205ML/MIN L1.77IN OD1.74MM ID1.359MM GRY

## (undated) DEVICE — GAUZE,SPONGE,4"X4",12PLY,STERILE,LF,2'S: Brand: MEDLINE

## (undated) DEVICE — WAX SURG 2.5GM HEMSTAT BNE BEESWAX PARAFFIN ISO PALMITATE

## (undated) DEVICE — SUTURE VCRL SZ 2-0 L27IN ABSRB UD L36MM CP-1 1/2 CIR REV J266H

## (undated) DEVICE — SUTURE VCRL SZ 1 L36IN ABSRB UD L36MM CT-1 1/2 CIR J947H

## (undated) DEVICE — CODMAN® SURGICAL PATTIES 1/2" X 1/2" (1.27CM X 1.27CM): Brand: CODMAN®

## (undated) DEVICE — 4-PORT MANIFOLD: Brand: NEPTUNE 2

## (undated) DEVICE — LABEL MED MINI W/ MARKER

## (undated) DEVICE — COVER LT HNDL BLU PLAS

## (undated) DEVICE — 1842 FOAM BLOCK NEEDLE COUNTER: Brand: DEVON

## (undated) DEVICE — 3M™ STERI-STRIP™ REINFORCED ADHESIVE SKIN CLOSURES, R1547, 1/2 IN X 4 IN (12 MM X 100 MM), 6 STRIPS/ENVELOPE: Brand: 3M™ STERI-STRIP™

## (undated) DEVICE — 3M™ IOBAN™ 2 ANTIMICROBIAL INCISE DRAPE 6650EZ: Brand: IOBAN™ 2

## (undated) DEVICE — 3 BONE CEMENT MIXER WITH SPATULA: Brand: MIXEVAC, ACM

## (undated) DEVICE — NEEDLE SPNL 22GAX1 1/2IN

## (undated) DEVICE — MEDI-VAC NON-CONDUCTIVE SUCTION TUBING: Brand: CARDINAL HEALTH

## (undated) DEVICE — SYRINGE MED 30ML STD CLR PLAS LUERLOCK TIP N CTRL DISP

## (undated) DEVICE — 450 ML BOTTLE OF 0.05% CHLORHEXIDINE GLUCONATE IN 99.95% STERILE WATER FOR IRRIGATION, USP AND APPLICATOR.: Brand: IRRISEPT ANTIMICROBIAL WOUND LAVAGE

## (undated) DEVICE — PENCIL ES L3M BTTN SWCH HOLSTER W/ BLDE ELECTRD EDGE

## (undated) DEVICE — STERILE PATIENT PROTECTIVE PAD FOR IMP® KNEE POSITIONERS & COHESIVE WRAP (10 / CASE): Brand: DE MAYO KNEE POSITIONER®

## (undated) DEVICE — C-ARM: Brand: UNBRANDED

## (undated) DEVICE — ALCON SURGICAL BLADE 64: Brand: ALCON

## (undated) DEVICE — SUTURE MCRYL SZ 4-0 L27IN ABSRB UD L19MM PS-2 1/2 CIR PRIM Y426H

## (undated) DEVICE — SUTURE VCRL + SZ 4-0 L18IN ABSRB UD L19MM PS-2 3/8 CIR PRIM VCP496H

## (undated) DEVICE — TOTAL TRAY, DB, 100% SILI FOLEY, 16FR 10: Brand: MEDLINE

## (undated) DEVICE — SKIN MARKER,REGULAR TIP WITH RULER: Brand: DEVON

## (undated) DEVICE — DRAPE MICSCP W46XL120IN FOR ZEISS MD FEATURING CLEARLENS

## (undated) DEVICE — 2000CC GUARDIAN II: Brand: GUARDIAN

## (undated) DEVICE — 3M™ STERI-DRAPE™ INSTRUMENT POUCH 1018: Brand: STERI-DRAPE™

## (undated) DEVICE — ELECTRODE PT RET AD L9FT HI MOIST COND ADH HYDRGEL CORDED

## (undated) DEVICE — APPLICATOR MEDICATED 26 CC SOLUTION HI LT ORNG CHLORAPREP

## (undated) DEVICE — CATHETER F BLLN 5CC 14FR 2 W HYDRGEL COAT LESS TRAUM LUB

## (undated) DEVICE — PACK PROCEDURE SURG TOTAL KNEE PACK

## (undated) DEVICE — PAD,ABDOMINAL,8"X10",ST,LF: Brand: MEDLINE

## (undated) DEVICE — STERILE LATEX POWDER-FREE SURGICAL GLOVESWITH NITRILE COATING: Brand: PROTEXIS

## (undated) DEVICE — 3.0MM PRECISION NEURO (MATCH HEAD)

## (undated) DEVICE — Device: Brand: STABLECUT®

## (undated) DEVICE — PACK,LAPAROTOMY,NO GOWNS: Brand: MEDLINE